# Patient Record
Sex: FEMALE | Race: WHITE | Employment: OTHER | URBAN - METROPOLITAN AREA
[De-identification: names, ages, dates, MRNs, and addresses within clinical notes are randomized per-mention and may not be internally consistent; named-entity substitution may affect disease eponyms.]

---

## 2017-02-21 RX ORDER — FLUTICASONE PROPIONATE 50 MCG
2 SPRAY, SUSPENSION (ML) NASAL DAILY
Qty: 3 BOTTLE | Refills: 3 | Status: SHIPPED | OUTPATIENT
Start: 2017-02-21 | End: 2018-06-19

## 2017-02-21 NOTE — TELEPHONE ENCOUNTER
From: Sharon Rios  To: David Franklin MD  Sent: 2/21/2017 9:44 AM CST  Subject: Medication Renewal Request    Original authorizing provider: MD Sharon Castillo would like a refill of the following medications:  Fluticasone

## 2017-10-19 ENCOUNTER — OFFICE VISIT (OUTPATIENT)
Dept: INTERNAL MEDICINE CLINIC | Facility: CLINIC | Age: 62
End: 2017-10-19

## 2017-10-19 VITALS
HEIGHT: 60 IN | SYSTOLIC BLOOD PRESSURE: 122 MMHG | DIASTOLIC BLOOD PRESSURE: 80 MMHG | WEIGHT: 167 LBS | BODY MASS INDEX: 32.79 KG/M2 | TEMPERATURE: 99 F | HEART RATE: 72 BPM

## 2017-10-19 DIAGNOSIS — G62.9 PERIPHERAL POLYNEUROPATHY: ICD-10-CM

## 2017-10-19 DIAGNOSIS — R51.9 CHRONIC NONINTRACTABLE HEADACHE, UNSPECIFIED HEADACHE TYPE: ICD-10-CM

## 2017-10-19 DIAGNOSIS — M75.81 RIGHT ROTATOR CUFF TENDINITIS: ICD-10-CM

## 2017-10-19 DIAGNOSIS — M32.9 SYSTEMIC LUPUS ERYTHEMATOSUS, UNSPECIFIED SLE TYPE, UNSPECIFIED ORGAN INVOLVEMENT STATUS (HCC): Primary | ICD-10-CM

## 2017-10-19 DIAGNOSIS — G89.29 CHRONIC NONINTRACTABLE HEADACHE, UNSPECIFIED HEADACHE TYPE: ICD-10-CM

## 2017-10-19 DIAGNOSIS — M81.0 OSTEOPOROSIS, UNSPECIFIED OSTEOPOROSIS TYPE, UNSPECIFIED PATHOLOGICAL FRACTURE PRESENCE: ICD-10-CM

## 2017-10-19 PROBLEM — J44.9 ASTHMA WITH COPD (CHRONIC OBSTRUCTIVE PULMONARY DISEASE) (HCC): Chronic | Status: ACTIVE | Noted: 2017-10-19

## 2017-10-19 PROBLEM — J44.89 ASTHMA WITH COPD (CHRONIC OBSTRUCTIVE PULMONARY DISEASE): Chronic | Status: ACTIVE | Noted: 2017-10-19

## 2017-10-19 PROBLEM — E66.01 SEVERE OBESITY (BMI 35.0-39.9) WITH COMORBIDITY (HCC): Chronic | Status: ACTIVE | Noted: 2017-10-19

## 2017-10-19 PROBLEM — J44.89 ASTHMA WITH COPD (CHRONIC OBSTRUCTIVE PULMONARY DISEASE) (HCC): Chronic | Status: ACTIVE | Noted: 2017-10-19

## 2017-10-19 PROCEDURE — G0463 HOSPITAL OUTPT CLINIC VISIT: HCPCS | Performed by: INTERNAL MEDICINE

## 2017-10-19 PROCEDURE — G0008 ADMIN INFLUENZA VIRUS VAC: HCPCS | Performed by: INTERNAL MEDICINE

## 2017-10-19 PROCEDURE — 99214 OFFICE O/P EST MOD 30 MIN: CPT | Performed by: INTERNAL MEDICINE

## 2017-10-19 PROCEDURE — 90686 IIV4 VACC NO PRSV 0.5 ML IM: CPT | Performed by: INTERNAL MEDICINE

## 2017-10-19 NOTE — PROGRESS NOTES
Elysia Villegas is a 63year old female. HPI:   Patient presents with:  Shoulder Pain: Pt complains of right shoulder pain x 6 months.        62 y/o F with SLE here for F/U; c/o right shoulder pain; no trauma; no paresthesias; no F/C; also reports pain Comment:Other reaction(s):  Anaphylaxis  Relafen                 Anaphylaxis              ROS:   Constitutional: no weight loss; no fatigue  Cardiovascular:  Negative for chest pain; negative palpitations  Respiratory:  Negative for cough, dyspnea and whe[place] GI Dr Trino Noyola; had mammogram in March 2017 as ordered by Dr Alison Shanks (chiropractor)         RTC 4 mos            Orders This Visit:    Orders Placed This Encounter      Flulaval 0.5 ml 6 mon and older Quad single dose PF (30825)    Meds This Visit:    No pre

## 2018-06-20 RX ORDER — FLUTICASONE PROPIONATE 50 MCG
SPRAY, SUSPENSION (ML) NASAL
Qty: 48 G | Refills: 3 | Status: SHIPPED | OUTPATIENT
Start: 2018-06-20 | End: 2019-09-07

## 2018-08-30 ENCOUNTER — TELEPHONE (OUTPATIENT)
Dept: INTERNAL MEDICINE CLINIC | Facility: CLINIC | Age: 63
End: 2018-08-30

## 2018-08-30 DIAGNOSIS — Z12.39 SCREENING FOR BREAST CANCER: Primary | ICD-10-CM

## 2018-08-30 NOTE — TELEPHONE ENCOUNTER
Please call pt, had labs done Monday at [facility], were results rec'd? Pt also requesting order for mammogram, would like to go prior to physical on 9/17/18.     Please call to advise  Tasked to nursing

## 2018-08-30 NOTE — TELEPHONE ENCOUNTER
Please advise regarding pending order for mammogram and patient wants to know if labs were received from [facility] thanks - to DR. VALDEZ

## 2018-08-30 NOTE — TELEPHONE ENCOUNTER
I have placed order for screening mammogram; I have not received any report of lab results from [address] [place] [address]; please call pt

## 2018-08-30 NOTE — TELEPHONE ENCOUNTER
Called patient. Relayed Dr Claudeen Pretzel message to her. She verbalized understanding. I gave her phone number for central scheduling to schedule mammogram.  She does not think the lab results are in yet, she usually gets notified when they are.

## 2018-09-17 ENCOUNTER — TELEPHONE (OUTPATIENT)
Dept: INTERNAL MEDICINE CLINIC | Facility: CLINIC | Age: 63
End: 2018-09-17

## 2018-09-17 ENCOUNTER — HOSPITAL ENCOUNTER (OUTPATIENT)
Dept: BONE DENSITY | Age: 63
Discharge: HOME | End: 2018-09-17
Attending: INTERNAL MEDICINE
Payer: MEDICARE

## 2018-09-17 ENCOUNTER — HOSPITAL ENCOUNTER (OUTPATIENT)
Dept: MAMMOGRAPHY | Age: 63
Discharge: HOME | End: 2018-09-17
Attending: INTERNAL MEDICINE
Payer: MEDICARE

## 2018-09-17 ENCOUNTER — OFFICE VISIT (OUTPATIENT)
Dept: INTERNAL MEDICINE CLINIC | Facility: CLINIC | Age: 63
End: 2018-09-17
Payer: MEDICARE

## 2018-09-17 VITALS
HEART RATE: 74 BPM | HEIGHT: 60 IN | BODY MASS INDEX: 34.75 KG/M2 | TEMPERATURE: 98 F | WEIGHT: 177 LBS | DIASTOLIC BLOOD PRESSURE: 68 MMHG | SYSTOLIC BLOOD PRESSURE: 126 MMHG

## 2018-09-17 DIAGNOSIS — R92.8 ABNORMAL MAMMOGRAM: ICD-10-CM

## 2018-09-17 DIAGNOSIS — Z86.59 HISTORY OF DEPRESSION: ICD-10-CM

## 2018-09-17 DIAGNOSIS — M81.8 OTHER OSTEOPOROSIS, UNSPECIFIED PATHOLOGICAL FRACTURE PRESENCE: ICD-10-CM

## 2018-09-17 DIAGNOSIS — Z00.00 PHYSICAL EXAM, ANNUAL: Primary | ICD-10-CM

## 2018-09-17 DIAGNOSIS — R92.8 ABNORMAL MAMMOGRAM: Primary | ICD-10-CM

## 2018-09-17 DIAGNOSIS — M81.0 OSTEOPOROSIS: ICD-10-CM

## 2018-09-17 DIAGNOSIS — M32.9 SYSTEMIC LUPUS ERYTHEMATOSUS, UNSPECIFIED SLE TYPE, UNSPECIFIED ORGAN INVOLVEMENT STATUS (HCC): ICD-10-CM

## 2018-09-17 DIAGNOSIS — G62.9 PERIPHERAL POLYNEUROPATHY: ICD-10-CM

## 2018-09-17 DIAGNOSIS — R51.9 CHRONIC NONINTRACTABLE HEADACHE, UNSPECIFIED HEADACHE TYPE: ICD-10-CM

## 2018-09-17 DIAGNOSIS — E66.9 OBESITY (BMI 30.0-34.9): ICD-10-CM

## 2018-09-17 DIAGNOSIS — Z12.39 SCREENING FOR BREAST CANCER: ICD-10-CM

## 2018-09-17 DIAGNOSIS — G89.29 CHRONIC NONINTRACTABLE HEADACHE, UNSPECIFIED HEADACHE TYPE: ICD-10-CM

## 2018-09-17 DIAGNOSIS — K64.8 INTERNAL HEMORRHOID: ICD-10-CM

## 2018-09-17 DIAGNOSIS — K21.9 GASTROESOPHAGEAL REFLUX DISEASE, ESOPHAGITIS PRESENCE NOT SPECIFIED: ICD-10-CM

## 2018-09-17 PROCEDURE — G0008 ADMIN INFLUENZA VIRUS VAC: HCPCS | Performed by: INTERNAL MEDICINE

## 2018-09-17 PROCEDURE — 77080 DXA BONE DENSITY AXIAL: CPT | Performed by: INTERNAL MEDICINE

## 2018-09-17 PROCEDURE — 90686 IIV4 VACC NO PRSV 0.5 ML IM: CPT | Performed by: INTERNAL MEDICINE

## 2018-09-17 PROCEDURE — G0438 PPPS, INITIAL VISIT: HCPCS | Performed by: INTERNAL MEDICINE

## 2018-09-17 RX ORDER — PREDNISONE 10 MG/1
10 TABLET ORAL
Refills: 0 | Status: ON HOLD | COMMUNITY
Start: 2018-08-27 | End: 2018-11-01 | Stop reason: ALTCHOICE

## 2018-09-17 RX ORDER — METHOTREXATE 25 MG/ML
0.4 INJECTION, SOLUTION INTRA-ARTERIAL; INTRAMUSCULAR; INTRAVENOUS
Refills: 0 | Status: ON HOLD | COMMUNITY
Start: 2018-09-10 | End: 2018-11-02

## 2018-09-17 RX ORDER — PANTOPRAZOLE SODIUM 40 MG/1
40 TABLET, DELAYED RELEASE ORAL
Qty: 90 TABLET | Refills: 3 | Status: SHIPPED | OUTPATIENT
Start: 2018-09-17 | End: 2019-09-07

## 2018-09-17 NOTE — PROGRESS NOTES
Kalyan Tejeda is a 66year old female. HPI:   Patient presents with:  Physical: Pt is here for Medicare physical  Hemorrhoids: Pt also states she has loose bowels and didn't know if they were related to the hemorrhoids.    Foot Pain: Pt states she ha Take 1 tablet (40 mg total) by mouth every morning before breakfast. Disp: 90 tablet Rfl: 3   FLUTICASONE PROPIONATE 50 MCG/ACT Nasal Suspension USE 2 SPRAYS IN EACH NOSTRIL EVERY DAY Disp: 48 g Rfl: 3   gabapentin (NEURONTIN) 300 MG Oral Cap Take 2 capsul Functional Status     Hearing Problems?: No    Vision Problems? : No    Difficulty walking?: Yes    Difficulty dressing or bathing?: No    Problems with daily activities? : Yes    Memory Problems?: No      Fall/Risk Assessment     Do you have 3 or more GLUCOSE (mg/dL)   Date Value   05/02/2014 109 (H)    No flowsheet data found.    Cardiovascular Disease Screening     LDL Annually No results found for: LDL, LDLC     EKG One Time     Colorectal Cancer Screening      Colonoscopy Screen every 10 years [place] No flowsheet data found. Creatinine  Annually CREATININE (mg/dL)   Date Value   05/02/2014 0.69    No flowsheet data found. Digoxin Serum Conc  Annually No results found for: DIGOXIN No flowsheet data found.     Diabetes      HgbA1C  Annually No re wheezes  Abdomen: normoactive bowel sounds, soft, non-tender and non-distended  Extremities: no clubbing, cyanosis or edema  Vascular: no carotid bruits; DP/PT 2/2  Musculoskeletal: Motor 5/5 upper and lower extremities  Neurological: cranial nerves II-XII Visit:  Requested Prescriptions     Signed Prescriptions Disp Refills   • Pantoprazole Sodium 40 MG Oral Tab EC 90 tablet 3     Sig: Take 1 tablet (40 mg total) by mouth every morning before breakfast.       Imaging & Referrals:  SURGERY - INTERNAL  FLULAV

## 2018-09-17 NOTE — TELEPHONE ENCOUNTER
Routine Mammogram shows lump in right breast.  Needs order for Diagnostic Mammogram with possible U/S RT breast.    Please call Lara Crowell [phone] when ordered has been entered.   Routed high to clinical.

## 2018-09-20 ENCOUNTER — TELEPHONE (OUTPATIENT)
Dept: INTERNAL MEDICINE CLINIC | Facility: CLINIC | Age: 63
End: 2018-09-20

## 2018-09-20 ENCOUNTER — HOSPITAL ENCOUNTER (OUTPATIENT)
Dept: ULTRASOUND IMAGING | Facility: HOSPITAL | Age: 63
Discharge: HOME | End: 2018-09-20
Attending: INTERNAL MEDICINE
Payer: MEDICARE

## 2018-09-20 ENCOUNTER — HOSPITAL ENCOUNTER (OUTPATIENT)
Dept: MAMMOGRAPHY | Facility: HOSPITAL | Age: 63
Discharge: HOME | End: 2018-09-20
Attending: INTERNAL MEDICINE
Payer: MEDICARE

## 2018-09-20 DIAGNOSIS — N63.10 MASS OF BREAST, RIGHT: Primary | ICD-10-CM

## 2018-09-20 DIAGNOSIS — R92.8 ABNORMAL MAMMOGRAM: ICD-10-CM

## 2018-09-20 DIAGNOSIS — R92.1 BREAST CALCIFICATIONS: ICD-10-CM

## 2018-09-20 PROCEDURE — 77062 BREAST TOMOSYNTHESIS BI: CPT | Performed by: INTERNAL MEDICINE

## 2018-09-20 PROCEDURE — 76642 ULTRASOUND BREAST LIMITED: CPT | Performed by: INTERNAL MEDICINE

## 2018-09-20 PROCEDURE — 77066 DX MAMMO INCL CAD BI: CPT | Performed by: INTERNAL MEDICINE

## 2018-09-20 NOTE — TELEPHONE ENCOUNTER
Pt had mammogram today   Asks for Dr Marleen Alexander to review results ASAP  So she can schedule a biopsy    [phone]

## 2018-09-20 NOTE — IMAGING NOTE
Discussed recommended breast biopsy with patient. Met  With J Carlos Luz in consult room .   Hx of palpable area right breast x 2 months Dr Darron Casey recommending an ultrasound and stereotactic biopsy of the  Right breast. Pt verbalizes to this rn arin to biopsy denies usage    -Aspirin currently being taken as a preventative measure should be held for 5 days prior  to biopsy. Denies usage    -Aspirin (325mg) being taken related to a cardiac condition should be held at the  direction of your physician. Additional mammography films will then be taken to assure correct placement of the marker. Educated the patient they will be awake during this procedure and are able to drive themselves home if they wish.     Educated patient that some soreness may occur a

## 2018-09-20 NOTE — TELEPHONE ENCOUNTER
Received call from radiology; pt has breast mass at right subareolar area suspicious for malignancy; also has area of calcifications in upper-central-posterior right breast that needs biopsy; breast care coordinator will be coordinating the two biopsies; b

## 2018-09-20 NOTE — IMAGING NOTE
Discussed recommended breast biopsy with patient. Reviewed pertinent patient history, family history of cancer, and patient medications.   Patient reports taking the following medications and over-the-counter supplements:    Discussed with patient Radiol this procedure and are able to drive themselves home if they wish. Educated patient that some soreness may occur after biopsy. Discussed use of a supportive bra and ice packs after procedure, to decrease soreness.     Discussed with patient no swimming,

## 2018-09-21 ENCOUNTER — TELEPHONE (OUTPATIENT)
Dept: HEMATOLOGY/ONCOLOGY | Facility: HOSPITAL | Age: 63
End: 2018-09-21

## 2018-09-21 NOTE — TELEPHONE ENCOUNTER
Phoned patient and introduced myself as Breast Oncology RN Navigator. Patient has been scheduled for right breast biopsy 9/26/18, by the Department of Radiology.   Shared with patient that Dr. Anshu Lopez has referred her to Dr Venu De La Paz for follow up after h

## 2018-09-26 ENCOUNTER — HOSPITAL ENCOUNTER (OUTPATIENT)
Dept: MAMMOGRAPHY | Facility: HOSPITAL | Age: 63
Discharge: HOME | End: 2018-09-26
Attending: INTERNAL MEDICINE
Payer: MEDICARE

## 2018-09-26 ENCOUNTER — HOSPITAL ENCOUNTER (OUTPATIENT)
Dept: ULTRASOUND IMAGING | Facility: HOSPITAL | Age: 63
Discharge: HOME | End: 2018-09-26
Attending: INTERNAL MEDICINE
Payer: MEDICARE

## 2018-09-26 VITALS
BODY MASS INDEX: 34.36 KG/M2 | SYSTOLIC BLOOD PRESSURE: 166 MMHG | WEIGHT: 175 LBS | HEIGHT: 60 IN | HEART RATE: 81 BPM | DIASTOLIC BLOOD PRESSURE: 90 MMHG | RESPIRATION RATE: 18 BRPM

## 2018-09-26 VITALS
HEIGHT: 60 IN | DIASTOLIC BLOOD PRESSURE: 72 MMHG | SYSTOLIC BLOOD PRESSURE: 157 MMHG | BODY MASS INDEX: 34.36 KG/M2 | HEART RATE: 77 BPM | WEIGHT: 175 LBS

## 2018-09-26 DIAGNOSIS — R92.1 BREAST CALCIFICATIONS: ICD-10-CM

## 2018-09-26 DIAGNOSIS — R92.8 ABNORMAL MAMMOGRAM: ICD-10-CM

## 2018-09-26 DIAGNOSIS — N63.10 MASS OF BREAST, RIGHT: ICD-10-CM

## 2018-09-26 PROCEDURE — 77065 DX MAMMO INCL CAD UNI: CPT | Performed by: INTERNAL MEDICINE

## 2018-09-26 PROCEDURE — 88360 TUMOR IMMUNOHISTOCHEM/MANUAL: CPT | Performed by: INTERNAL MEDICINE

## 2018-09-26 PROCEDURE — 88305 TISSUE EXAM BY PATHOLOGIST: CPT | Performed by: INTERNAL MEDICINE

## 2018-09-26 PROCEDURE — 19081 BX BREAST 1ST LESION STRTCTC: CPT | Performed by: INTERNAL MEDICINE

## 2018-09-26 PROCEDURE — 88341 IMHCHEM/IMCYTCHM EA ADD ANTB: CPT | Performed by: INTERNAL MEDICINE

## 2018-09-26 PROCEDURE — 88342 IMHCHEM/IMCYTCHM 1ST ANTB: CPT | Performed by: INTERNAL MEDICINE

## 2018-09-26 PROCEDURE — 19083 BX BREAST 1ST LESION US IMAG: CPT | Performed by: INTERNAL MEDICINE

## 2018-09-26 PROCEDURE — 88374 M/PHMTRC ALYS ISHQUANT/SEMIQ: CPT | Performed by: PATHOLOGY

## 2018-09-26 RX ORDER — LIDOCAINE HYDROCHLORIDE AND EPINEPHRINE 10; 10 MG/ML; UG/ML
INJECTION, SOLUTION INFILTRATION; PERINEURAL
Status: DISPENSED
Start: 2018-09-26 | End: 2018-09-26

## 2018-09-26 RX ORDER — LIDOCAINE HYDROCHLORIDE AND EPINEPHRINE 10; 10 MG/ML; UG/ML
1 INJECTION, SOLUTION INFILTRATION; PERINEURAL ONCE
Status: COMPLETED | OUTPATIENT
Start: 2018-09-26 | End: 2018-09-26

## 2018-09-26 RX ORDER — SODIUM CHLORIDE 9 MG/ML
INJECTION, SOLUTION INTRAVENOUS
Status: DISPENSED
Start: 2018-09-26 | End: 2018-09-26

## 2018-09-26 RX ORDER — LIDOCAINE HYDROCHLORIDE 10 MG/ML
5 INJECTION, SOLUTION EPIDURAL; INFILTRATION; INTRACAUDAL; PERINEURAL ONCE
Status: COMPLETED | OUTPATIENT
Start: 2018-09-26 | End: 2018-09-26

## 2018-09-26 RX ORDER — LIDOCAINE HYDROCHLORIDE 10 MG/ML
INJECTION, SOLUTION EPIDURAL; INFILTRATION; INTRACAUDAL; PERINEURAL
Status: DISPENSED
Start: 2018-09-26 | End: 2018-09-26

## 2018-09-26 RX ORDER — MAGNESIUM HYDROXIDE 1200 MG/15ML
250 LIQUID ORAL CONTINUOUS
Status: DISCONTINUED | OUTPATIENT
Start: 2018-09-26 | End: 2018-09-28

## 2018-09-26 RX ADMIN — MAGNESIUM HYDROXIDE 250 ML: 1200 LIQUID ORAL at 11:48:00

## 2018-09-26 NOTE — IMAGING NOTE
1225: Mrs. Wes Armas brought to ultrasound room #4 post stereotactic right breast biopsy.  SCANS BY Ultrasound Technologist, Que ABDALLA TAKEN:  Reports history of right breast biopsy 4587/5521- benign findings, fibroadenoma.   Reported right breast m

## 2018-09-26 NOTE — IMAGING NOTE
1000 am hx taken          Hx of palpable area right breast x 2 months Dr Eddy Public recommending an ultrasound and stereotactic biopsy of the  Right breast. Pt has 2 children spouse  at age 47 from stroke.  Hx of lupus fibromyalgia     Per Dr Eddy Public b CORETAINER TAKEN TO BE IMAGED, AFTER IMAGING COMPLETED  FORMALIN ADDED TO SAMPLES AT 1151 am     CLIP INSERTED PROCEDURE COMPLETE AT 1152 amPRESSURE TO SITE X 10 MINUTES MANUALLY BY RN AND BY MACHINE COMPRESSION    1218 pm CORES TAKEN TO PATHLOGY BY noel

## 2018-09-26 NOTE — PROCEDURES
[facility] HOSP - [facility]  Procedure Note    Mamta Pierce Patient Status:  Outpatient    1955 MRN [id number]   Location [address] [facility] Attending Charles Craig MD   Hosp Day # 0 PCP Lakeisha Coppola MD     [facility] How Severe Is It?: moderate Is This A New Presentation, Or A Follow-Up?: Follow Up Accutane When Was Accutane Started?: 05/09/2017

## 2018-09-27 ENCOUNTER — TELEPHONE (OUTPATIENT)
Dept: INTERNAL MEDICINE CLINIC | Facility: CLINIC | Age: 63
End: 2018-09-27

## 2018-09-27 NOTE — TELEPHONE ENCOUNTER
Right breast mass at 1 o'clock, 2 cm from the nipple is positive for malignancy; pt called; she is seeing Dr Bin Pedersen tomorrow for surgical consultation

## 2018-09-28 ENCOUNTER — TELEPHONE (OUTPATIENT)
Dept: HEMATOLOGY/ONCOLOGY | Facility: HOSPITAL | Age: 63
End: 2018-09-28

## 2018-09-28 PROBLEM — C50.911 STAGE I BREAST CANCER, RIGHT (HCC): Status: ACTIVE | Noted: 2018-09-28

## 2018-09-28 NOTE — TELEPHONE ENCOUNTER
Phoned patient as follow up to her right breast US guided biopsy. Patient is aware of the malignant pathology results, having spoken with Dr. Terri Torres yesterday evening. Patient is scheduled to meet with Dr. Meenu Green today at 2:15.     Reinforced my rol

## 2018-10-01 ENCOUNTER — TELEPHONE (OUTPATIENT)
Dept: HEMATOLOGY/ONCOLOGY | Facility: HOSPITAL | Age: 63
End: 2018-10-01

## 2018-10-01 ENCOUNTER — APPOINTMENT (OUTPATIENT)
Dept: RADIATION ONCOLOGY | Facility: HOSPITAL | Age: 63
End: 2018-10-01
Attending: RADIOLOGY
Payer: MEDICARE

## 2018-10-01 NOTE — TELEPHONE ENCOUNTER
Phoned patient to discuss care coordination. Patient was seen by Dr. Ron Major 9/28/18 to discuss surgical management of her right breast cancer. Dr. Ron Major has referred patient for consultation with Dr. Brandt North, as well as Dr. Corinna Lyn.   In addition, joe

## 2018-10-01 NOTE — TELEPHONE ENCOUNTER
Phoned patient to discuss genetic counseling and testing. Patient has a family history of pancreatic cancer in her maternal aunt.   Shared with patient that her recent diagnosis, as well as family history, she does meet criteria for genetic testing, should

## 2018-10-02 ENCOUNTER — HOSPITAL ENCOUNTER (OUTPATIENT)
Dept: MRI IMAGING | Age: 63
Discharge: HOME | End: 2018-10-02
Attending: SURGERY
Payer: MEDICARE

## 2018-10-02 ENCOUNTER — HOSPITAL ENCOUNTER (OUTPATIENT)
Dept: GENERAL RADIOLOGY | Age: 63
Discharge: HOME | End: 2018-10-02
Attending: SURGERY
Payer: MEDICARE

## 2018-10-02 DIAGNOSIS — M32.0 DRUG-INDUCED SYSTEMIC LUPUS ERYTHEMATOSUS (HCC): ICD-10-CM

## 2018-10-02 DIAGNOSIS — M32.0 DRUG-INDUCED SYSTEMIC LUPUS ERYTHEMATOSUS, UNSPECIFIED ORGAN INVOLVEMENT STATUS (HCC): ICD-10-CM

## 2018-10-02 DIAGNOSIS — C50.911 STAGE I BREAST CANCER, RIGHT (HCC): ICD-10-CM

## 2018-10-02 PROCEDURE — A9575 INJ GADOTERATE MEGLUMI 0.1ML: HCPCS | Performed by: SURGERY

## 2018-10-02 PROCEDURE — 71046 X-RAY EXAM CHEST 2 VIEWS: CPT | Performed by: SURGERY

## 2018-10-02 PROCEDURE — 82565 ASSAY OF CREATININE: CPT

## 2018-10-02 PROCEDURE — 0159T MRI BREAST (W+WO) W/CAD BILAT (CPT=77059/0159T): CPT | Performed by: SURGERY

## 2018-10-02 PROCEDURE — 77059 MRI BREAST (W+WO) W/CAD BILAT (CPT=77059/0159T): CPT | Performed by: SURGERY

## 2018-10-04 ENCOUNTER — GENETICS ENCOUNTER (OUTPATIENT)
Dept: GENETICS | Facility: HOSPITAL | Age: 63
End: 2018-10-04
Attending: GENETIC COUNSELOR, MS
Payer: MEDICARE

## 2018-10-04 ENCOUNTER — LABORATORY ENCOUNTER (OUTPATIENT)
Dept: HEMATOLOGY/ONCOLOGY | Facility: HOSPITAL | Age: 63
End: 2018-10-04
Attending: GENETIC COUNSELOR, MS
Payer: MEDICARE

## 2018-10-04 DIAGNOSIS — C50.911 STAGE I BREAST CANCER, RIGHT (HCC): Primary | ICD-10-CM

## 2018-10-04 DIAGNOSIS — Z80.0 FAMILY HISTORY OF PANCREATIC CANCER: ICD-10-CM

## 2018-10-04 DIAGNOSIS — Z80.42 FAMILY HISTORY OF PROSTATE CANCER: ICD-10-CM

## 2018-10-04 PROCEDURE — 36415 COLL VENOUS BLD VENIPUNCTURE: CPT

## 2018-10-04 PROCEDURE — 96040 MEDICAL GENETICS COUNSELING EA 30 MIN: CPT | Performed by: GENETIC COUNSELOR, MS

## 2018-10-04 NOTE — PROGRESS NOTES
Reason for visit: Mrs. Jose Casey is a 82-year-old woman referred to genetic counseling due to her recent diagnosis of breast cancer. She was seen today to discuss the option of genetic testing and how this may help with her management and surgical options. age 63. Her sister was diagnosed with thyroid cancer (pathology unknown to her) at age 64 and was treated with surgery only and is alive and well at age 59. Her mother was diagnosed with thyroid cancer at age 32 and with lung cancer at age 61.   She is a combination with other cancers, especially ovarian cancer, and when cancer appears to be passing from generation to generation. We discussed dominant inheritance, the pattern in which most hereditary cancer conditions are inherited.   If an individual ha carriers of some of these mutations.   There are panels that assess for mutations in only “high risk” and clinically actionable breast cancer genes as well as larger panels that assess for mutations in high, moderate and unknown-penetrance breast cancer gen available. 3. Recommendations for Mrs. Renny Zeng and family members will depend on above test results. Send to: Dr. Mendy Ramirez. Josiah Watson  Time spent with patient: 40 minutes.

## 2018-10-05 ENCOUNTER — OFFICE VISIT (OUTPATIENT)
Dept: RADIATION ONCOLOGY | Facility: HOSPITAL | Age: 63
End: 2018-10-05
Attending: RADIOLOGY
Payer: MEDICARE

## 2018-10-05 ENCOUNTER — OFFICE VISIT (OUTPATIENT)
Dept: HEMATOLOGY/ONCOLOGY | Facility: HOSPITAL | Age: 63
End: 2018-10-05
Attending: GENETIC COUNSELOR, MS
Payer: MEDICARE

## 2018-10-05 VITALS
SYSTOLIC BLOOD PRESSURE: 138 MMHG | TEMPERATURE: 98 F | HEART RATE: 89 BPM | HEIGHT: 60 IN | DIASTOLIC BLOOD PRESSURE: 68 MMHG | RESPIRATION RATE: 16 BRPM | WEIGHT: 173 LBS | BODY MASS INDEX: 33.96 KG/M2

## 2018-10-05 VITALS
WEIGHT: 173 LBS | RESPIRATION RATE: 18 BRPM | DIASTOLIC BLOOD PRESSURE: 68 MMHG | HEIGHT: 60 IN | BODY MASS INDEX: 33.96 KG/M2 | SYSTOLIC BLOOD PRESSURE: 138 MMHG | HEART RATE: 89 BPM | TEMPERATURE: 98 F

## 2018-10-05 DIAGNOSIS — C50.911 MALIGNANT NEOPLASM OF RIGHT BREAST IN FEMALE, ESTROGEN RECEPTOR POSITIVE, UNSPECIFIED SITE OF BREAST (HCC): Primary | ICD-10-CM

## 2018-10-05 DIAGNOSIS — Z17.0 MALIGNANT NEOPLASM OF RIGHT BREAST IN FEMALE, ESTROGEN RECEPTOR POSITIVE, UNSPECIFIED SITE OF BREAST (HCC): Primary | ICD-10-CM

## 2018-10-05 DIAGNOSIS — M32.9 SYSTEMIC LUPUS ERYTHEMATOSUS, UNSPECIFIED SLE TYPE, UNSPECIFIED ORGAN INVOLVEMENT STATUS (HCC): ICD-10-CM

## 2018-10-05 PROCEDURE — 99212 OFFICE O/P EST SF 10 MIN: CPT

## 2018-10-05 PROCEDURE — 99205 OFFICE O/P NEW HI 60 MIN: CPT | Performed by: INTERNAL MEDICINE

## 2018-10-05 NOTE — PROGRESS NOTES
Nursing Consultation Note  Patient: Tracy Cao  YOB: 1955  Age: 66year old  Radiation Oncologist: Dr. Yoan Cornelius  Referring Physician: Kate Murcia  Diagnosis:No diagnosis found.   Consult Date: 10/5/2018      Chemotherapy: Yes, 1 tablet (40 mg total) by mouth every morning before breakfast. Disp: 90 tablet Rfl: 3   FLUTICASONE PROPIONATE 50 MCG/ACT Nasal Suspension USE 2 SPRAYS IN EACH NOSTRIL EVERY DAY Disp: 48 g Rfl: 3   gabapentin (NEURONTIN) 300 MG Oral Cap Take 2 capsules by Food insecurity - inability: Not on file      Transportation needs - medical: Not on file      Transportation needs - non-medical: Not on file    Occupational History      Not on file    Tobacco Use      Smoking status: Never Smoker      Smokeless tobacco:

## 2018-10-05 NOTE — CONSULTS
RADIATION ONCOLOGY NOTE    DATE OF VISIT: 10/5/2018    DIAGNOSIS:  [id number] IDC RUIQ s/p biopsy, ER 95/NM 90 Her2 Fish pending, Favorable Ki-67 8%, Grade 1, genetics pending.     Dear Sherry Ellsworth, [address] and colleagues,    Per discussion at breast t Rfl: 0   Pantoprazole Sodium 40 MG Oral Tab EC Take 1 tablet (40 mg total) by mouth every morning before breakfast. Disp: 90 tablet Rfl: 3   FLUTICASONE PROPIONATE 50 MCG/ACT Nasal Suspension USE 2 SPRAYS IN EACH NOSTRIL EVERY DAY Disp: 48 g Rfl: 3   gabap her maternal grandmother; Cancer (age of onset: 46) in her maternal cousin female; Cancer (age of onset: 61) in her maternal grandfather; Cancer (age of onset: 64) in her sister; Cancer (age of onset: 58) in her maternal aunt;  Cancer (age of onset: 67) in COMPARISON: [facility], [facility], [facility] JOANN 2D+3D DIAGNOSTIC [facility] BILAT (TAV=[id number]), 9/20/2018, 10:29. [facility], [facility]. [facility], [facility] POST [facility] IMAGE RIGHT ([id number]=[id number]), 9/26/2018, 14:14.      INDICATIONS:  M32.0 Drug-in small probable foci throughout the right breast with one most pronounced posteriorly in the midline at the   level of the nipple measuring about 5 mm and about 7.8 cm back from the nipple.  It has similar enhancement pattern to all be other small enhancing CATEGORY 6--KNOWN BIOPSY PROVEN MALIGNANCY: RIGHT BREAST. RECOMMENDATIONS:   SURGICAL CONSULTATION. PLEASE NOTE: A NORMAL MRI DOES NOT EXCLUDE THE POSSIBILITY OF BREAST CANCER.   A CLINICALLY SUSPICIOUS PALPABLE LUMP SHOULD BE BIOPS clone [id number]) status:  2+ (Equivocal)  Ki-67 (Leica, monoclonal, clone MM1) status:  8% (Favorable)        ASCO/CAP Scoring Criteria:  ER/PgR:    > 1% (Positive), Intensity of staining (weak, moderate, strong)  <1% (Negative) Internal control present and ER/

## 2018-10-05 NOTE — PROGRESS NOTES
Primary language:  English  Language line required?  no  Comprehension Ability:  excellent  Able to read?  yes  Able to write? yes  Communication tools:  na  Patient's ability to learn:  excellent  Readiness to learn:   Motivated  Learning preferences:  Luz Dowell basix skin care measures during radiation therapy Instruct patient on additional skin care measures during radiation therapy Keep area clean and dry Encourage fluids/diet    Expected Outcomes:  knowledge of care plan    Progress Toward Outcome:  Making pro

## 2018-10-06 NOTE — CONSULTS
[facility]    PATIENT'S NAME: Nataliia Rose JEFE   CONSULTING PHYSICIAN: Elijah Ramirez MD   PATIENT ACCOUNT #: [de-identified] LOCATION: [address] [place] [address] RECORD #: [id number] YOB: 1955   CONSULTATION DATE: 10/05/2018       CANCER C at age 30. MEDICATION:  Prednisone 10 mg daily, Plaquenil, methotrexate,nortriptyline, Protonix, fluticasone. ALLERGIES:  Amitriptyline. FAMILY MEDICAL HISTORY:  No history of breast cancer in the family.   There was history of ovarian cancer in m erythematosus, the patient is more interested in proceeding with mastectomy rather than lumpectomy followed by radiation. We would support this decision. She will be meeting with Surgery again next week.   We will not offer any neoadjuvant treatment, but

## 2018-10-08 ENCOUNTER — TELEPHONE (OUTPATIENT)
Dept: HEMATOLOGY/ONCOLOGY | Facility: HOSPITAL | Age: 63
End: 2018-10-08

## 2018-10-11 ENCOUNTER — TELEPHONE (OUTPATIENT)
Dept: GENETICS | Facility: HOSPITAL | Age: 63
End: 2018-10-11

## 2018-10-11 NOTE — TELEPHONE ENCOUNTER
LM for [facility] that her genetic testing result yielded negative results for the 9 genes studied aside from a VUS in CAROLYNN (c.514T>C). No change in management given VUS result.  She had testing for [facility] Breast Cancer STAT Panel with CAROLYNN and CHEK2 (9 genes,

## 2018-10-19 ENCOUNTER — OFFICE VISIT (OUTPATIENT)
Dept: SURGERY | Facility: CLINIC | Age: 63
End: 2018-10-19
Payer: MEDICARE

## 2018-10-19 VITALS — HEIGHT: 60 IN | WEIGHT: 180.19 LBS | BODY MASS INDEX: 35.37 KG/M2

## 2018-10-19 DIAGNOSIS — C50.911 STAGE I BREAST CANCER, RIGHT (HCC): Primary | ICD-10-CM

## 2018-10-19 PROCEDURE — 99203 OFFICE O/P NEW LOW 30 MIN: CPT | Performed by: SURGERY

## 2018-10-19 NOTE — CONSULTS
New Patient Consultation    This is the first visit for this 66year old female who presents to discuss reconstructive options following surgery for breast cancer. History of Present Illness:    The patient is a 66year old female who presents with a rig Anaphylaxis  Relafen                 ANAPHYLAXIS      Family History:   Family History   Problem Relation Age of Onset   • Prostate Cancer Father 62        d. 58 metastatic   • Heart Attack Father         MI   • Cancer Mother 32        thyroid; lung @ 61 (s swelling. Breasts: The patient denies +breast masses, pain, change in the breast skin, skin dimpling, nipple discharge, or rash.     Gastrointestinal:  There is no history of difficulty or pain with swallowing, +reflux symptoms, nausea, vomiting, dark/b palpable masses. The trachea is in the midline. Conjunctiva are clear, non-icteric. Chest: The chest expands symmetrically. The lungs are clear to auscultation. Heart: The rhythm is regular. There are no murmurs, rubs, gallops or thrills.     Right well as the need for a secondary procedure for placement of a permanent implant,  were reviewed. Representative illustrations and photographs were demonstrated to the patient.  The risks of surgery including but not limited to bleeding, infection, scarring patient was counseled on the elevated risk of wound healing difficulties given her chronic immunosuppressive therapy.     Finally, we discussed the possible need for revisions, the process of nipple areolar reconstruction, and a contralateral balancing proc

## 2018-10-25 RX ORDER — FOLIC ACID 1 MG/1
TABLET ORAL DAILY
COMMUNITY
End: 2019-08-01

## 2018-10-30 ENCOUNTER — TELEPHONE (OUTPATIENT)
Dept: HEMATOLOGY/ONCOLOGY | Facility: HOSPITAL | Age: 63
End: 2018-10-30

## 2018-10-30 ENCOUNTER — OFFICE VISIT (OUTPATIENT)
Dept: PHYSICAL THERAPY | Facility: HOSPITAL | Age: 63
End: 2018-10-30
Attending: SURGERY
Payer: MEDICARE

## 2018-10-30 NOTE — PROGRESS NOTES
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

## 2018-10-30 NOTE — TELEPHONE ENCOUNTER
Phoned patient to provide pre operative education and support. Patient is schedule for right breast modified radical mastectomy, sentinel lymph node biopsy with frozen section. Educated patient as to sentinel lymph node biopsy.  This included lymphoscin

## 2018-11-01 ENCOUNTER — ANESTHESIA (OUTPATIENT)
Dept: SURGERY | Facility: HOSPITAL | Age: 63
DRG: 580 | End: 2018-11-01
Payer: MEDICARE

## 2018-11-01 ENCOUNTER — HOSPITAL ENCOUNTER (OUTPATIENT)
Facility: HOSPITAL | Age: 63
Setting detail: OBSERVATION
Discharge: HOME | DRG: 580 | End: 2018-11-02
Attending: SURGERY | Admitting: INTERNAL MEDICINE
Payer: MEDICARE

## 2018-11-01 ENCOUNTER — ANESTHESIA EVENT (OUTPATIENT)
Dept: SURGERY | Facility: HOSPITAL | Age: 63
DRG: 580 | End: 2018-11-01
Payer: MEDICARE

## 2018-11-01 ENCOUNTER — HOSPITAL ENCOUNTER (OUTPATIENT)
Dept: NUCLEAR MEDICINE | Facility: HOSPITAL | Age: 63
Discharge: HOME | DRG: 580 | End: 2018-11-01
Attending: SURGERY
Payer: MEDICARE

## 2018-11-01 DIAGNOSIS — C50.911 BREAST CANCER, RIGHT (HCC): ICD-10-CM

## 2018-11-01 DIAGNOSIS — C50.911 PRIMARY CANCER OF RIGHT BREAST WITH STAGE 2 NODAL METASTASIS PER AMERICAN JOINT COMMITTEE ON CANCER 7TH EDITION (N2) (HCC): Primary | ICD-10-CM

## 2018-11-01 DIAGNOSIS — C77.9 PRIMARY CANCER OF RIGHT BREAST WITH STAGE 2 NODAL METASTASIS PER AMERICAN JOINT COMMITTEE ON CANCER 7TH EDITION (N2) (HCC): Primary | ICD-10-CM

## 2018-11-01 PROCEDURE — 07T50ZZ RESECTION OF RIGHT AXILLARY LYMPHATIC, OPEN APPROACH: ICD-10-PCS | Performed by: SURGERY

## 2018-11-01 PROCEDURE — C71L1ZZ PLANAR NUCLEAR MEDICINE IMAGING OF UPPER CHEST LYMPHATICS USING TECHNETIUM 99M (TC-99M): ICD-10-PCS | Performed by: RADIOLOGY

## 2018-11-01 PROCEDURE — 3E0W3KZ INTRODUCTION OF OTHER DIAGNOSTIC SUBSTANCE INTO LYMPHATICS, PERCUTANEOUS APPROACH: ICD-10-PCS | Performed by: SURGERY

## 2018-11-01 PROCEDURE — 07B50ZX EXCISION OF RIGHT AXILLARY LYMPHATIC, OPEN APPROACH, DIAGNOSTIC: ICD-10-PCS | Performed by: SURGERY

## 2018-11-01 PROCEDURE — 0HTT0ZZ RESECTION OF RIGHT BREAST, OPEN APPROACH: ICD-10-PCS | Performed by: SURGERY

## 2018-11-01 PROCEDURE — A4216 STERILE WATER/SALINE, 10 ML: HCPCS

## 2018-11-01 PROCEDURE — 78195 LYMPH SYSTEM IMAGING: CPT | Performed by: SURGERY

## 2018-11-01 RX ORDER — MORPHINE SULFATE 4 MG/ML
2 INJECTION, SOLUTION INTRAMUSCULAR; INTRAVENOUS EVERY 10 MIN PRN
Status: DISCONTINUED | OUTPATIENT
Start: 2018-11-01 | End: 2018-11-01 | Stop reason: HOSPADM

## 2018-11-01 RX ORDER — SODIUM PHOSPHATE, DIBASIC AND SODIUM PHOSPHATE, MONOBASIC 7; 19 G/133ML; G/133ML
1 ENEMA RECTAL ONCE AS NEEDED
Status: DISCONTINUED | OUTPATIENT
Start: 2018-11-01 | End: 2018-11-02

## 2018-11-01 RX ORDER — HYDROCODONE BITARTRATE AND ACETAMINOPHEN 5; 325 MG/1; MG/1
2 TABLET ORAL AS NEEDED
Status: DISCONTINUED | OUTPATIENT
Start: 2018-11-01 | End: 2018-11-01 | Stop reason: HOSPADM

## 2018-11-01 RX ORDER — FOLIC ACID 1 MG/1
1 TABLET ORAL DAILY
Status: DISCONTINUED | OUTPATIENT
Start: 2018-11-02 | End: 2018-11-02

## 2018-11-01 RX ORDER — METOCLOPRAMIDE 10 MG/1
10 TABLET ORAL ONCE
Status: DISCONTINUED | OUTPATIENT
Start: 2018-11-01 | End: 2018-11-01 | Stop reason: HOSPADM

## 2018-11-01 RX ORDER — GLYCOPYRROLATE 0.2 MG/ML
INJECTION, SOLUTION INTRAMUSCULAR; INTRAVENOUS AS NEEDED
Status: DISCONTINUED | OUTPATIENT
Start: 2018-11-01 | End: 2018-11-01 | Stop reason: SURG

## 2018-11-01 RX ORDER — DEXAMETHASONE SODIUM PHOSPHATE 4 MG/ML
VIAL (ML) INJECTION AS NEEDED
Status: DISCONTINUED | OUTPATIENT
Start: 2018-11-01 | End: 2018-11-01 | Stop reason: SURG

## 2018-11-01 RX ORDER — NORTRIPTYLINE HYDROCHLORIDE 10 MG/1
20 CAPSULE ORAL NIGHTLY
Status: DISCONTINUED | OUTPATIENT
Start: 2018-11-01 | End: 2018-11-02

## 2018-11-01 RX ORDER — DOCUSATE SODIUM 100 MG/1
100 CAPSULE, LIQUID FILLED ORAL 2 TIMES DAILY
Status: DISCONTINUED | OUTPATIENT
Start: 2018-11-01 | End: 2018-11-02

## 2018-11-01 RX ORDER — NEOSTIGMINE METHYLSULFATE 0.5 MG/ML
INJECTION INTRAVENOUS AS NEEDED
Status: DISCONTINUED | OUTPATIENT
Start: 2018-11-01 | End: 2018-11-01 | Stop reason: SURG

## 2018-11-01 RX ORDER — HYDROCODONE BITARTRATE AND ACETAMINOPHEN 5; 325 MG/1; MG/1
1 TABLET ORAL AS NEEDED
Status: DISCONTINUED | OUTPATIENT
Start: 2018-11-01 | End: 2018-11-01 | Stop reason: HOSPADM

## 2018-11-01 RX ORDER — LIDOCAINE HYDROCHLORIDE 10 MG/ML
INJECTION, SOLUTION EPIDURAL; INFILTRATION; INTRACAUDAL; PERINEURAL AS NEEDED
Status: DISCONTINUED | OUTPATIENT
Start: 2018-11-01 | End: 2018-11-01 | Stop reason: SURG

## 2018-11-01 RX ORDER — ACETAMINOPHEN 500 MG
1000 TABLET ORAL EVERY 8 HOURS
Status: DISCONTINUED | OUTPATIENT
Start: 2018-11-01 | End: 2018-11-02

## 2018-11-01 RX ORDER — ONDANSETRON 2 MG/ML
4 INJECTION INTRAMUSCULAR; INTRAVENOUS ONCE AS NEEDED
Status: DISCONTINUED | OUTPATIENT
Start: 2018-11-01 | End: 2018-11-01 | Stop reason: HOSPADM

## 2018-11-01 RX ORDER — SODIUM CHLORIDE, SODIUM LACTATE, POTASSIUM CHLORIDE, CALCIUM CHLORIDE 600; 310; 30; 20 MG/100ML; MG/100ML; MG/100ML; MG/100ML
INJECTION, SOLUTION INTRAVENOUS CONTINUOUS
Status: DISCONTINUED | OUTPATIENT
Start: 2018-11-01 | End: 2018-11-01 | Stop reason: ALTCHOICE

## 2018-11-01 RX ORDER — DOCUSATE SODIUM 100 MG/1
100 CAPSULE, LIQUID FILLED ORAL 2 TIMES DAILY
Qty: 14 CAPSULE | Refills: 1 | Status: SHIPPED | OUTPATIENT
Start: 2018-11-01 | End: 2018-11-08

## 2018-11-01 RX ORDER — FAMOTIDINE 20 MG/1
20 TABLET ORAL ONCE
Status: DISCONTINUED | OUTPATIENT
Start: 2018-11-01 | End: 2018-11-01 | Stop reason: HOSPADM

## 2018-11-01 RX ORDER — MORPHINE SULFATE 10 MG/ML
6 INJECTION, SOLUTION INTRAMUSCULAR; INTRAVENOUS EVERY 10 MIN PRN
Status: DISCONTINUED | OUTPATIENT
Start: 2018-11-01 | End: 2018-11-01 | Stop reason: HOSPADM

## 2018-11-01 RX ORDER — GABAPENTIN 300 MG/1
600 CAPSULE ORAL 2 TIMES DAILY
Status: DISCONTINUED | OUTPATIENT
Start: 2018-11-01 | End: 2018-11-02

## 2018-11-01 RX ORDER — MORPHINE SULFATE 2 MG/ML
2 INJECTION, SOLUTION INTRAMUSCULAR; INTRAVENOUS EVERY 2 HOUR PRN
Status: DISCONTINUED | OUTPATIENT
Start: 2018-11-01 | End: 2018-11-02

## 2018-11-01 RX ORDER — TRAMADOL HYDROCHLORIDE 50 MG/1
TABLET ORAL EVERY 6 HOURS PRN
Status: DISCONTINUED | OUTPATIENT
Start: 2018-11-01 | End: 2018-11-02

## 2018-11-01 RX ORDER — PHENYLEPHRINE HCL 10 MG/ML
VIAL (ML) INJECTION AS NEEDED
Status: DISCONTINUED | OUTPATIENT
Start: 2018-11-01 | End: 2018-11-01 | Stop reason: SURG

## 2018-11-01 RX ORDER — ACETAMINOPHEN 500 MG
1000 TABLET ORAL ONCE
Status: COMPLETED | OUTPATIENT
Start: 2018-11-01 | End: 2018-11-01

## 2018-11-01 RX ORDER — CEFAZOLIN SODIUM/WATER 2 G/20 ML
2 SYRINGE (ML) INTRAVENOUS ONCE
Status: COMPLETED | OUTPATIENT
Start: 2018-11-01 | End: 2018-11-01

## 2018-11-01 RX ORDER — BISACODYL 10 MG
10 SUPPOSITORY, RECTAL RECTAL
Status: DISCONTINUED | OUTPATIENT
Start: 2018-11-01 | End: 2018-11-02

## 2018-11-01 RX ORDER — NALOXONE HYDROCHLORIDE 0.4 MG/ML
80 INJECTION, SOLUTION INTRAMUSCULAR; INTRAVENOUS; SUBCUTANEOUS AS NEEDED
Status: DISCONTINUED | OUTPATIENT
Start: 2018-11-01 | End: 2018-11-01 | Stop reason: HOSPADM

## 2018-11-01 RX ORDER — METOCLOPRAMIDE HYDROCHLORIDE 5 MG/ML
10 INJECTION INTRAMUSCULAR; INTRAVENOUS EVERY 6 HOURS PRN
Status: DISCONTINUED | OUTPATIENT
Start: 2018-11-01 | End: 2018-11-02

## 2018-11-01 RX ORDER — TRAMADOL HYDROCHLORIDE 50 MG/1
TABLET ORAL EVERY 6 HOURS PRN
Qty: 25 TABLET | Refills: 0 | Status: SHIPPED | OUTPATIENT
Start: 2018-11-01 | End: 2019-03-06

## 2018-11-01 RX ORDER — MORPHINE SULFATE 2 MG/ML
1 INJECTION, SOLUTION INTRAMUSCULAR; INTRAVENOUS EVERY 2 HOUR PRN
Status: DISCONTINUED | OUTPATIENT
Start: 2018-11-01 | End: 2018-11-02

## 2018-11-01 RX ORDER — PANTOPRAZOLE SODIUM 40 MG/1
40 TABLET, DELAYED RELEASE ORAL
Status: DISCONTINUED | OUTPATIENT
Start: 2018-11-02 | End: 2018-11-02

## 2018-11-01 RX ORDER — EPHEDRINE SULFATE 50 MG/ML
INJECTION, SOLUTION INTRAVENOUS AS NEEDED
Status: DISCONTINUED | OUTPATIENT
Start: 2018-11-01 | End: 2018-11-01 | Stop reason: SURG

## 2018-11-01 RX ORDER — CEFAZOLIN SODIUM/WATER 2 G/20 ML
2 SYRINGE (ML) INTRAVENOUS EVERY 8 HOURS
Status: COMPLETED | OUTPATIENT
Start: 2018-11-01 | End: 2018-11-02

## 2018-11-01 RX ORDER — DEXTROSE, SODIUM CHLORIDE, AND POTASSIUM CHLORIDE 5; .45; .15 G/100ML; G/100ML; G/100ML
INJECTION INTRAVENOUS CONTINUOUS
Status: DISCONTINUED | OUTPATIENT
Start: 2018-11-01 | End: 2018-11-02

## 2018-11-01 RX ORDER — ONDANSETRON 2 MG/ML
INJECTION INTRAMUSCULAR; INTRAVENOUS AS NEEDED
Status: DISCONTINUED | OUTPATIENT
Start: 2018-11-01 | End: 2018-11-01 | Stop reason: SURG

## 2018-11-01 RX ORDER — HYDROXYCHLOROQUINE SULFATE 200 MG/1
200 TABLET, FILM COATED ORAL DAILY
Status: DISCONTINUED | OUTPATIENT
Start: 2018-11-02 | End: 2018-11-02

## 2018-11-01 RX ORDER — SODIUM CHLORIDE, SODIUM LACTATE, POTASSIUM CHLORIDE, CALCIUM CHLORIDE 600; 310; 30; 20 MG/100ML; MG/100ML; MG/100ML; MG/100ML
INJECTION, SOLUTION INTRAVENOUS CONTINUOUS
Status: DISCONTINUED | OUTPATIENT
Start: 2018-11-01 | End: 2018-11-01 | Stop reason: HOSPADM

## 2018-11-01 RX ORDER — ROCURONIUM BROMIDE 10 MG/ML
INJECTION, SOLUTION INTRAVENOUS AS NEEDED
Status: DISCONTINUED | OUTPATIENT
Start: 2018-11-01 | End: 2018-11-01 | Stop reason: SURG

## 2018-11-01 RX ORDER — MIDAZOLAM HYDROCHLORIDE 1 MG/ML
INJECTION INTRAMUSCULAR; INTRAVENOUS AS NEEDED
Status: DISCONTINUED | OUTPATIENT
Start: 2018-11-01 | End: 2018-11-01 | Stop reason: SURG

## 2018-11-01 RX ORDER — ISOSULFAN BLUE 50 MG/5ML
INJECTION, SOLUTION SUBCUTANEOUS AS NEEDED
Status: DISCONTINUED | OUTPATIENT
Start: 2018-11-01 | End: 2018-11-01 | Stop reason: HOSPADM

## 2018-11-01 RX ORDER — MORPHINE SULFATE 4 MG/ML
4 INJECTION, SOLUTION INTRAMUSCULAR; INTRAVENOUS EVERY 2 HOUR PRN
Status: DISCONTINUED | OUTPATIENT
Start: 2018-11-01 | End: 2018-11-02

## 2018-11-01 RX ORDER — ONDANSETRON 2 MG/ML
8 INJECTION INTRAMUSCULAR; INTRAVENOUS EVERY 6 HOURS PRN
Status: DISCONTINUED | OUTPATIENT
Start: 2018-11-01 | End: 2018-11-02

## 2018-11-01 RX ORDER — FLUTICASONE PROPIONATE 50 MCG
2 SPRAY, SUSPENSION (ML) NASAL DAILY
Status: DISCONTINUED | OUTPATIENT
Start: 2018-11-01 | End: 2018-11-02

## 2018-11-01 RX ORDER — MORPHINE SULFATE 4 MG/ML
4 INJECTION, SOLUTION INTRAMUSCULAR; INTRAVENOUS EVERY 10 MIN PRN
Status: DISCONTINUED | OUTPATIENT
Start: 2018-11-01 | End: 2018-11-01 | Stop reason: HOSPADM

## 2018-11-01 RX ORDER — POLYETHYLENE GLYCOL 3350 17 G/17G
17 POWDER, FOR SOLUTION ORAL DAILY PRN
Status: DISCONTINUED | OUTPATIENT
Start: 2018-11-01 | End: 2018-11-02

## 2018-11-01 RX ADMIN — SODIUM CHLORIDE, SODIUM LACTATE, POTASSIUM CHLORIDE, CALCIUM CHLORIDE: 600; 310; 30; 20 INJECTION, SOLUTION INTRAVENOUS at 10:38:00

## 2018-11-01 RX ADMIN — SODIUM CHLORIDE, SODIUM LACTATE, POTASSIUM CHLORIDE, CALCIUM CHLORIDE: 600; 310; 30; 20 INJECTION, SOLUTION INTRAVENOUS at 13:43:00

## 2018-11-01 RX ADMIN — CEFAZOLIN SODIUM/WATER 2 G: 2 G/20 ML SYRINGE (ML) INTRAVENOUS at 11:01:00

## 2018-11-01 RX ADMIN — GLYCOPYRROLATE 0.8 MG: 0.2 INJECTION, SOLUTION INTRAMUSCULAR; INTRAVENOUS at 13:30:00

## 2018-11-01 RX ADMIN — LIDOCAINE HYDROCHLORIDE 60 MG: 10 INJECTION, SOLUTION EPIDURAL; INFILTRATION; INTRACAUDAL; PERINEURAL at 10:43:00

## 2018-11-01 RX ADMIN — EPHEDRINE SULFATE 10 MG: 50 INJECTION, SOLUTION INTRAVENOUS at 11:10:00

## 2018-11-01 RX ADMIN — PHENYLEPHRINE HCL 100 MCG: 10 MG/ML VIAL (ML) INJECTION at 11:05:00

## 2018-11-01 RX ADMIN — PHENYLEPHRINE HCL 100 MCG: 10 MG/ML VIAL (ML) INJECTION at 10:53:00

## 2018-11-01 RX ADMIN — ONDANSETRON 4 MG: 2 INJECTION INTRAMUSCULAR; INTRAVENOUS at 11:02:00

## 2018-11-01 RX ADMIN — ROCURONIUM BROMIDE 30 MG: 10 INJECTION, SOLUTION INTRAVENOUS at 11:22:00

## 2018-11-01 RX ADMIN — DEXAMETHASONE SODIUM PHOSPHATE 4 MG: 4 MG/ML VIAL (ML) INJECTION at 11:02:00

## 2018-11-01 RX ADMIN — MIDAZOLAM HYDROCHLORIDE 2 MG: 1 INJECTION INTRAMUSCULAR; INTRAVENOUS at 10:38:00

## 2018-11-01 RX ADMIN — SODIUM CHLORIDE, SODIUM LACTATE, POTASSIUM CHLORIDE, CALCIUM CHLORIDE: 600; 310; 30; 20 INJECTION, SOLUTION INTRAVENOUS at 12:00:00

## 2018-11-01 RX ADMIN — NEOSTIGMINE METHYLSULFATE 4 MG: 0.5 INJECTION INTRAVENOUS at 13:30:00

## 2018-11-01 NOTE — ANESTHESIA PREPROCEDURE EVALUATION
Anesthesia PreOp Note    HPI:     Saint Council is a 66year old female who presents for preoperative consultation requested by: Junior Ott MD    Date of Surgery: 11/1/2018    Procedure(s):  BREAST MODIFIED RADICAL MASTECTOMY  BREAST SENTINEL Osteopenia    • Peripheral neuropathy    • Raynaud's phenomenon    • SLE (systemic lupus erythematosus) ([facility] [address]     Dr Edmond Pulse   • Stress fracture of the metatarsals     4th MT       Past Surgical History:   Procedure Laterality Date   • ANKLE FRACTURE SURG (ANCEF/KEFZOL) 2 GM/20ML premix IV syringe 2 g 2 g Intravenous Once Em Centeno MD      No current [facility]-ordered outpatient medications on file.       Amitriptyline Hcl  *        Comment:Other reaction(s): Hallucination  Cyclobenzaprine Hcl*    PAL file    Other Topics      Concerns:         Service: Not Asked        Blood Transfusions: Not Asked        Caffeine Concern: Yes          Coffee 2 cups daily;  Soda        Occupational Exposure: Not Asked        Hobby Hazards: Not Asked        Sleep damage if relevant, major complications, and any alternative forms of anesthetic management. All of the patient's questions were answered to the best of my ability. The patient desires the anesthetic management as planned.   Damien Jack  11/1/2018 10:20

## 2018-11-01 NOTE — CONSULTS
HPI:    Kalyan Tejeda is a 66year old  female. The patient presents to the office for Diagnosis of right breast cancer. The patient has a palpable mass of the right breast. It has been present since recently diagnosed.  Previous breast procedures inclu the right breast was also performed, and is reported separately in same-day stereotactic biopsy report.  Pathology was benign and concordant.     Per report 9/20/2018, a probably benign left breast mass at 3 o'clock, 5 cm from the nipple was also identifie clip in the right lower outer quadrant from previous stereotactic biopsy may be postsurgical in nature as the biopsy was benign.   BI-RADS CATEGORY:      DIAGNOSTIC CATEGORY 6--KNOWN BIOPSY PROVEN MALIGNANCY: RIGHT BREAST.       RECOMMENDATIONS:   SURGICAL Oral Tab Take 1 tablet by mouth daily. Disp:  Rfl:    Nortriptyline HCl (PAMELOR) 10 MG Oral Cap Take 3 tabs daily (Patient taking differently: Take 20 mg by mouth daily.  Take 1 tabs daily) Disp: 270 capsule Rfl: 0   [employer] VIT D 5000 HIGH-POTENCY 5000 UNIT Smokeless tobacco: Never Used    Alcohol use: No    Drug use: Not on file         ROS:   GENERAL HEALTH: otherwise feels well; 2 pounds weight loss  SKIN: denies any unusual skin lesions or rashes  EYES: no visual complaints or deficits  HEENT: denies nasa 66year old female with new diagnosis of right breast cancer. I had extensive discussions with the patient as well as her daughter's etiology above. She has met with medical oncology as well as radiation oncology.   I reviewed MRI results in detail with noel infection  Attention will be made during the operation to obtain adequate surgical margins. Pt wants to proceed.   We will plan right breast modified radical mastectomy with right axillary sentinel lymph node biopsy and frozen section possible lymph node d

## 2018-11-01 NOTE — OPERATIVE REPORT
[facility]    PATIENT'S NAME: Jaci MAYBERRY   ATTENDING PHYSICIAN: Von Harrell DO   OPERATING PHYSICIAN: Janeth Canavan., MD   PATIENT ACCOUNT#:   [de-identified]    LOCATION:  [facility] [address] PACU [facility]  MEDICAL RECORD #:   [id number] anesthesia including myocardial infarction, respiratory failure, renal failure, pulmonary embolus, DVT, and even death as well as increased risk for poor wound healing due to immunosuppressive medications were all discussed in detail with the patient as we present. A 10-second basin was 42. The breast and axilla were removed off the field. Axilla was marked with a suture. Completion axillary lymph node dissection was performed.   Axillary vein was identified, and Level 1 and Level 2 lymph nodes were swept

## 2018-11-01 NOTE — INTERVAL H&P NOTE
Pre-op Diagnosis: right breast cancer    The above referenced H&P was reviewed by Madison Akhtar MD on 11/1/2018, the patient was examined and no significant changes have occurred in the patient's condition since the H&P was performed.   I discussed wit

## 2018-11-01 NOTE — ANESTHESIA PROCEDURE NOTES
Airway  Date/Time: 11/1/2018 11:22 AM  Urgency: elective    Airway not difficult    General Information and Staff    Patient location during procedure: OR  Anesthesiologist: Aaron Calloway MD  Resident/CRNA: Letty Sandhoff, CRNA  Performed: Stephanie Farrar

## 2018-11-01 NOTE — ANESTHESIA POSTPROCEDURE EVALUATION
Patient: Marta White    Procedure Summary     Date:  11/01/18 Room / Location:  [facility] OR  / [facility] OR    Anesthesia Start:  9760 Anesthesia Stop:      Procedures:       BREAST MODIFIED RADICAL MASTECTOMY (Right )      BREAST SENTINEL LYMPH NOD

## 2018-11-01 NOTE — ANESTHESIA PROCEDURE NOTES
ANESTHESIA INTUBATION  Urgency: elective    Airway not difficult    General Information and Staff    Patient location during procedure: OR  Anesthesiologist: Finesse Kerns MD  Resident/CRNA: Tiffanie Pelayo CRNA  Performed: anesthesiologist and KEYSHAWN

## 2018-11-01 NOTE — H&P (VIEW-ONLY)
HPI:    Reno Amaya is a 66year old  female. The patient presents to the office for Diagnosis of right breast cancer. The patient has a palpable mass of the right breast. It has been present since recently diagnosed.  Previous breast procedures inclu the right breast was also performed, and is reported separately in same-day stereotactic biopsy report.  Pathology was benign and concordant.     Per report 9/20/2018, a probably benign left breast mass at 3 o'clock, 5 cm from the nipple was also identifie clip in the right lower outer quadrant from previous stereotactic biopsy may be postsurgical in nature as the biopsy was benign.   BI-RADS CATEGORY:      DIAGNOSTIC CATEGORY 6--KNOWN BIOPSY PROVEN MALIGNANCY: RIGHT BREAST.       RECOMMENDATIONS:   SURGICAL Oral Tab Take 1 tablet by mouth daily. Disp:  Rfl:    Nortriptyline HCl (PAMELOR) 10 MG Oral Cap Take 3 tabs daily (Patient taking differently: Take 20 mg by mouth daily.  Take 1 tabs daily) Disp: 270 capsule Rfl: 0   [facility] VIT D 5000 HIGH-POTENCY 5000 UNIT Smokeless tobacco: Never Used    Alcohol use: No    Drug use: Not on file         ROS:   GENERAL HEALTH: otherwise feels well; 2 pounds weight loss  SKIN: denies any unusual skin lesions or rashes  EYES: no visual complaints or deficits  HEENT: denies nasa 66year old female with new diagnosis of right breast cancer. I had extensive discussions with the patient as well as her daughter's etiology above. She has met with medical oncology as well as radiation oncology.   I reviewed MRI results in detail with noel infection  Attention will be made during the operation to obtain adequate surgical margins. Pt wants to proceed.   We will plan right breast modified radical mastectomy with right axillary sentinel lymph node biopsy and frozen section possible lymph node d

## 2018-11-01 NOTE — PLAN OF CARE
Pt received from PACU. Drowsy but oriented. Family at bedside. VSS. Blue tinge to skin from surgical dye. Jaw pain from intubation noted. Dressing to R breast CDI. Drain care per orders. Tolerating full liquids. Bowel sounds present.  BUE neuro [employer]

## 2018-11-01 NOTE — BRIEF OP NOTE
Pre-Operative Diagnosis: right breast cancer  Preoperative  clinical stage II     Post-Operative Diagnosis: The same positive axillary lymph node metastasis  post operative clinical stage IIb     Procedure Performed:   Procedure(s):  right breast modified

## 2018-11-02 ENCOUNTER — APPOINTMENT (OUTPATIENT)
Dept: HEMATOLOGY/ONCOLOGY | Facility: HOSPITAL | Age: 63
End: 2018-11-02
Attending: INTERNAL MEDICINE
Payer: MEDICARE

## 2018-11-02 VITALS
TEMPERATURE: 98 F | OXYGEN SATURATION: 95 % | BODY MASS INDEX: 34.36 KG/M2 | HEIGHT: 60 IN | WEIGHT: 175 LBS | RESPIRATION RATE: 18 BRPM | SYSTOLIC BLOOD PRESSURE: 113 MMHG | DIASTOLIC BLOOD PRESSURE: 61 MMHG | HEART RATE: 76 BPM

## 2018-11-02 PROCEDURE — 99217 OBSERVATION CARE DISCHARGE: CPT | Performed by: INTERNAL MEDICINE

## 2018-11-02 RX ORDER — FERROUS SULFATE 325(65) MG
325 TABLET ORAL
Qty: 30 TABLET | Refills: 0 | Status: SHIPPED | OUTPATIENT
Start: 2018-11-02 | End: 2019-03-06

## 2018-11-02 NOTE — PAYOR COMM NOTE
--------------  ADMISSION REVIEW     Payor: [employer]  Subscriber #:  [id number]  Authorization Number: [id number]    Admit date: 11/1/18  Admit time: 1501       Admitting Physician: Balaji Cardona DO  Attending Physician:  DO Anette Mckeon Toña Pablo CRNA    11/1/2018 1125 Given 0.005 mg Intravenous Lee Kim CRNA    11/1/2018 1120 Given 0.01 mg Intravenous Toña Cross CRNA      fentaNYL citrate (SUBLIMAZE) 0.05 MG/ML injection 25 mcg     Date Action Dose Route U mg     Date Action Dose Route User    11/1/2018 2000 Given 4 mg Intravenous Judith Huang RN    11/1/2018 1550 Given 4 mg Intravenous Priyanka Mejias RN      neostigmine methylsulfate Young Patel) injection     Date Action Dose Route User    11/1/2018 central coarse heterogeneous calcifications, at low suspicion for malignancy.  Stereotactic guided biopsy is recommended.     Left breast probably benign mass at 3 o'clock, 5 cm from the nipple.  Recommend short interval mammographic and sonographic follow dimension. · See comment.    Estrogen receptor (Leica, monoclonal, clone 6 F11) status: 95% (Strong intensity)  Progesterone receptor (Leica, monoclonal, clone 16) status: 90% (Strong intensity)  HER-2/jean pierre (Leica, monoclonal, clone CB11) status:  2+ (Equiv Surgery again next week. Alexy Gonzales will not offer any neoadjuvant treatment, but we will likely send an Oncotype test from her surgical specimen.     Gyne History:  Menarche at age 17. Age at birth of 1st child: 26. Number of pregnancies: .  Patient has histo indeterminate but will not affect her treatment decision. Katrina Nunn is opting towards mastectomy.  I discussed the need for plastic surgery evaluation.  The patient expressed concerns and does not want reconstruction due to her lupus and concerns of implant decrease her prednisone and stop it if at all possible.  Methotrexate will continue.      11/1 OP REPORT  Pre-Operative Diagnosis: right breast cancer  Preoperative  clinical stage II     Post-Operative Diagnosis: The same positive axillary lymph node metas feeling well thsi morning. No flatus yet.  Pain is managed with tylenol     OBJECTIVE:  /61 (BP Location: Left arm)   Pulse 76   Temp 97.9 °F (36.6 °C) (Oral)   Resp 18   Ht 5' (1.524 m)   Wt 175 lb (79.4 kg)   SpO2 95%   BMI 34.18 kg/m²      Intake/O erythematosus) ([facility] [address]  on [id number] 467 mg po qD, folic acid daily  Off prednisone 10 mg qD, and MTX 12.5 mg /0.4 mL SQ qWeek due to surgery  Follows with Dr Basim Pang     Chronic headache  continue nortriptyline 10 mg two tabs (=20mg) po qHS     Peripheral

## 2018-11-02 NOTE — PROGRESS NOTES
[facility]    Progress Note    Lilliana Geiger Patient Status:  Inpatient    1955 MRN [id number]   Location [facility] 4W/SW/SE Attending Ronda Youssef,    Hosp Day # 1 PCP Ruth Paul MD       SUBJECTIVE:  States s did have a reaction to the blue dye used in anesthesia, but is feeling better now. R arm had some numbness but has seemed to have resolved at ~2am. Reports slight cough. Ambulating well without difficulty.        SLE (systemic lupus erythematosus) (HCC)  on

## 2018-11-02 NOTE — PLAN OF CARE
NEUROLOGICAL - ADULT    • Achieves maximal functionality and self care Progressing        Patient Centered Care    • Patient preferences are identified and integrated in the patient's plan of care Progressing        Patient/Family Goals    • Patient/Family

## 2018-11-02 NOTE — PROGRESS NOTES
[place] [facility] - [facility]    Progress Note    Maryhelen Form Patient Status:  Inpatient    1955 MRN [id number]   Location [facility] 4W/SW/SE Attending Charley Brittle, [address] [facility] Day # 1 PCP Krish Ferraro MD       Subjective:   [facility] counseling/coordination of care:    43328- 25 min      Siva 30 11/2/2018  8:38 AM

## 2018-11-02 NOTE — PLAN OF CARE
NEUROLOGICAL - ADULT    • Achieves maximal functionality and self care Completed        Patient Centered Care    • Patient preferences are identified and integrated in the patient's plan of care Completed        Patient/Family Goals    • Patient/Family Ric

## 2018-11-05 ENCOUNTER — TELEPHONE (OUTPATIENT)
Dept: HEMATOLOGY/ONCOLOGY | Facility: HOSPITAL | Age: 63
End: 2018-11-05

## 2018-11-05 NOTE — TELEPHONE ENCOUNTER
Anahi Orozco called to speak with Brady Mejia regarding a follow up with Dr. Mac Sandhu, Please advise

## 2018-11-09 ENCOUNTER — NURSE NAVIGATOR ENCOUNTER (OUTPATIENT)
Dept: HEMATOLOGY/ONCOLOGY | Facility: HOSPITAL | Age: 63
End: 2018-11-09

## 2018-11-12 ENCOUNTER — TELEPHONE (OUTPATIENT)
Dept: HEMATOLOGY/ONCOLOGY | Facility: HOSPITAL | Age: 63
End: 2018-11-12

## 2018-11-12 NOTE — TELEPHONE ENCOUNTER
Phoned patient to discuss re-scheduling her follow up with Dr. Gilford Gauze. Oncotype DX order has been placed. Will await results prior to next office visit. Appointment re-scheduled for 11/28/18 at 3pm.  All appointment information provided to patient.   She a

## 2018-11-13 ENCOUNTER — PATIENT MESSAGE (OUTPATIENT)
Dept: INTERNAL MEDICINE CLINIC | Facility: CLINIC | Age: 63
End: 2018-11-13

## 2018-11-13 NOTE — TELEPHONE ENCOUNTER
From: Tracy Cao  To: Balta Silveira MD  Sent: 11/13/2018 11:03 AM CST  Subject: Non-Urgent Elis Parra,   When I left the hospital after my mastectomy, I was placed on iron pill due to my low hemoglobin at the time.   How long d

## 2018-11-15 NOTE — PAYOR COMM NOTE
Juliana Villalta #[id number] (64 year old F)   [address] [facility], [address] [place] [address]   Physician   Internal Medicine   Progress Notes   Signed   Date of Service:  11/2/2018  9:58 AM               Signed                   Show:Clear all  [x]Manual[x] CONCLUSION: Successful lymphoscintigraphy procedure.     Dictated by (CST): Ernesto Montes MD on 11/01/2018 at 9:14     Approved by (CST): Ernesto Montes MD on 11/01/2018 at 9:15                       Assessment and Plan:      1) R breast carcinoma s/p R MRM wi

## 2018-11-19 ENCOUNTER — TELEPHONE (OUTPATIENT)
Dept: HEMATOLOGY/ONCOLOGY | Facility: HOSPITAL | Age: 63
End: 2018-11-19

## 2018-11-19 NOTE — TELEPHONE ENCOUNTER
Call received from patient with complaints of pain to her right upper arm. Patient is s/p right modified radical mastectomy, performed 11/1/18. Patient shares in her right upper arm, on the back of her arm she is experiencing \"burning pain\".   The pa

## 2018-11-19 NOTE — TELEPHONE ENCOUNTER
Phoned patient with feedback from Dr. Clarissa Starr. Instructed patient to add anti inflammatory to her pain regimen. Patient denies allergy to NSAID, and has taken Ibuprofen previously. Patient to take Ibuprofen OTC as instructed with food.     Instructed pa

## 2018-11-20 ENCOUNTER — TELEPHONE (OUTPATIENT)
Dept: INTERNAL MEDICINE CLINIC | Facility: CLINIC | Age: 63
End: 2018-11-20

## 2018-11-20 ENCOUNTER — TELEPHONE (OUTPATIENT)
Dept: HEMATOLOGY/ONCOLOGY | Facility: HOSPITAL | Age: 63
End: 2018-11-20

## 2018-11-20 ENCOUNTER — TELEPHONE (OUTPATIENT)
Dept: PHYSICAL THERAPY | Facility: HOSPITAL | Age: 63
End: 2018-11-20

## 2018-11-20 NOTE — TELEPHONE ENCOUNTER
Call received from patient. Patient shares the pain to her right upper extremity persists after adding Ibuprofen to her pain protocol. Patient shares she woke this morning and is now experiencing swelling to her right upper extremity.   Patient describes

## 2018-11-20 NOTE — TELEPHONE ENCOUNTER
Medication questions for Dr Jazmine Theodore    1. Could  pt's gabapentin be increased due to her right arm swelling     2.  Should pt continue taking iron pills prescribed by Dr Ector Zapata due to low hemoglobin after her surgery/mastectomy    Please call to advis

## 2018-11-20 NOTE — TELEPHONE ENCOUNTER
To Dr. Angy Lilly - see below - can gabapentin be increased due to increased burning pain 600mg BID (has taken TID in past with no adverse reaction). Per pt: OK to wait until tomorrow. Pt does have call in to Dr. Carlee Goldstein.

## 2018-11-21 NOTE — TELEPHONE ENCOUNTER
Spoke with [facility] re: her arm swelling. [facility] will stop by the clinic tomorrow after her appointment w/Dr. Melva Robison.   -

## 2018-11-26 ENCOUNTER — OFFICE VISIT (OUTPATIENT)
Dept: PHYSICAL THERAPY | Facility: HOSPITAL | Age: 63
End: 2018-11-26
Attending: SURGERY
Payer: MEDICARE

## 2018-11-26 DIAGNOSIS — I89.0 LYMPHEDEMA: ICD-10-CM

## 2018-11-26 PROCEDURE — 97163 PT EVAL HIGH COMPLEX 45 MIN: CPT | Performed by: PHYSICAL THERAPIST

## 2018-11-26 PROCEDURE — 97140 MANUAL THERAPY 1/> REGIONS: CPT | Performed by: PHYSICAL THERAPIST

## 2018-11-26 PROCEDURE — 97110 THERAPEUTIC EXERCISES: CPT | Performed by: PHYSICAL THERAPIST

## 2018-11-26 NOTE — PROGRESS NOTES
PHYSICAL THERAPY UE LYMPHEDEMA EVALUATION:   Referring Physician: Dr. Yen Rojas  Diagnosis: Lymphedema (I89.0)  Date of Onset: 11/1/18  Insurance: [facility] [facility] Date of Evaluation: 11/26/18  Date of Order: 11/23/18     PATIENT [place] is a 66 4 or more tests/measures) Close clinical monitoring of symptoms and response to treatment interventions will be necessary due to chronicity of and progression of swelling, and use of varying compression, patient's compliance, risk of infections associated flex x 10, abd x 10.  Performed modified MLD to chest, axilla, and right trunk using lotion, gentle scar massage w/ lotion  Charges: PT Eval x1, ex1, man1      Total Timed Treatment: 35 min     Total Treatment Time: 90 min     PLAN OF CARE:    Goals (discus

## 2018-11-27 NOTE — TELEPHONE ENCOUNTER
To Dr. Isaac Elias - spoke with pt who stated that Gabapentin on \"back burner\" at present. Pt has some lymphedema developing along with some \"cording\" per PT. Pt asking when she can stop taking iron pills to reduce constipation - see below.   FYI - Pt st

## 2018-11-28 ENCOUNTER — OFFICE VISIT (OUTPATIENT)
Dept: HEMATOLOGY/ONCOLOGY | Facility: HOSPITAL | Age: 63
End: 2018-11-28
Attending: GENETIC COUNSELOR, MS
Payer: MEDICARE

## 2018-11-28 VITALS
SYSTOLIC BLOOD PRESSURE: 159 MMHG | WEIGHT: 178.56 LBS | RESPIRATION RATE: 18 BRPM | TEMPERATURE: 98 F | DIASTOLIC BLOOD PRESSURE: 86 MMHG | HEIGHT: 60 IN | BODY MASS INDEX: 35.05 KG/M2 | HEART RATE: 90 BPM

## 2018-11-28 DIAGNOSIS — M32.9 SYSTEMIC LUPUS ERYTHEMATOSUS, UNSPECIFIED SLE TYPE, UNSPECIFIED ORGAN INVOLVEMENT STATUS (HCC): ICD-10-CM

## 2018-11-28 DIAGNOSIS — M85.80 OSTEOPENIA, UNSPECIFIED LOCATION: ICD-10-CM

## 2018-11-28 DIAGNOSIS — C50.911 MALIGNANT NEOPLASM OF RIGHT BREAST IN FEMALE, ESTROGEN RECEPTOR POSITIVE, UNSPECIFIED SITE OF BREAST (HCC): Primary | ICD-10-CM

## 2018-11-28 DIAGNOSIS — Z17.0 MALIGNANT NEOPLASM OF RIGHT BREAST IN FEMALE, ESTROGEN RECEPTOR POSITIVE, UNSPECIFIED SITE OF BREAST (HCC): Primary | ICD-10-CM

## 2018-11-28 PROCEDURE — 99214 OFFICE O/P EST MOD 30 MIN: CPT | Performed by: INTERNAL MEDICINE

## 2018-11-28 RX ORDER — LETROZOLE 2.5 MG/1
2.5 TABLET, FILM COATED ORAL DAILY
Qty: 30 TABLET | Refills: 5 | Status: SHIPPED | OUTPATIENT
Start: 2018-11-28 | End: 2019-05-13

## 2018-11-28 NOTE — PROGRESS NOTES
Cancer Center Progress Note    Patient Name: Naz Jiménez   YOB: 1955   Medical Record Number: [id number]   Attending Physician: Radha Huggins M.D.      Chief Complaint:  Breast cancer    History of Present Illness:  Cancer history:  66 ye • HYSTERECTOMY     • OSCAR NEEDLE LOCALIZATION W/ SPECIMEN 1 SITE RIGHT  1992   • OTHER SURGICAL HISTORY  11/01/2018    right breast mastecetomy       Family History:  Family History   Problem Relation Age of Onset   • Prostate Cancer Father 62        d.62 Asked        Sleep Concern: Not Asked        Stress Concern: Not Asked        Weight Concern: Not Asked        Special Diet: Not Asked        Back Care: Not Asked        Exercise: Not Asked        Bike Helmet: Not Asked        Seat Belt: Not Asked        S 159/86 (BP Location: Left arm, Patient Position: Sitting)   Pulse 90   Temp 98.3 °F (36.8 °C) (Oral)   Resp 18   Ht 1.524 m (5')   Wt 81 kg (178 lb 9.2 oz)   BMI 34.88 kg/m²     Physical Examination:  General: Patient is alert and oriented x 3, not in acut has underlying history of SLE She has history of osteopenia/osteoporosis.   –Based on her Oncotype score do not recommend adjuvant chemotherapy  – She will meet with radiation oncology to discuss adjuvant radiotherapy given her node positive disease however

## 2018-11-29 ENCOUNTER — OFFICE VISIT (OUTPATIENT)
Dept: PHYSICAL THERAPY | Facility: HOSPITAL | Age: 63
End: 2018-11-29
Attending: SURGERY
Payer: MEDICARE

## 2018-11-29 ENCOUNTER — TELEPHONE (OUTPATIENT)
Dept: RADIATION ONCOLOGY | Facility: HOSPITAL | Age: 63
End: 2018-11-29

## 2018-11-29 PROCEDURE — 97140 MANUAL THERAPY 1/> REGIONS: CPT

## 2018-11-29 PROCEDURE — 97530 THERAPEUTIC ACTIVITIES: CPT

## 2018-11-29 PROCEDURE — 97110 THERAPEUTIC EXERCISES: CPT

## 2018-11-29 NOTE — PROGRESS NOTES
Referring Physician: Dr. Carlee Goldstein  Diagnosis: Lymphedema (I89.0)  Date of Onset: 11/1/18  Insurance: [address] [facility] Date of Evaluation: 11/26/18  Date of Order: 11/23/18         # of Authorized Visits:  10     Next MD visit/plan renewal:  10th visit or s 35 minutes): Provided MLD for RUE:  Neck sequence, deep breathing, L axilla, L A-A, R inguinal, R A-I, R trunk, R below chest incision, R UE.  STM to RUE to release cording. No cavitation felt but improved mobility noted at end of session.   Will initiate

## 2018-11-29 NOTE — TELEPHONE ENCOUNTER
Called pt and scheduled a re-evaluation with Dr Kay Galicia for 12/6/18 @ 100 pm. Pt going to PT same day.

## 2018-11-30 ENCOUNTER — OFFICE VISIT (OUTPATIENT)
Dept: PHYSICAL THERAPY | Facility: HOSPITAL | Age: 63
End: 2018-11-30
Attending: SURGERY
Payer: MEDICARE

## 2018-11-30 DIAGNOSIS — I89.0 LYMPHEDEMA: Primary | ICD-10-CM

## 2018-11-30 DIAGNOSIS — C50.919 BREAST CANCER (HCC): ICD-10-CM

## 2018-11-30 PROCEDURE — 97140 MANUAL THERAPY 1/> REGIONS: CPT

## 2018-11-30 PROCEDURE — 97110 THERAPEUTIC EXERCISES: CPT

## 2018-11-30 NOTE — PROGRESS NOTES
Referring Physician: Dr. Rosa Cantu  Diagnosis: Lymphedema (I89.0)  Date of Onset: 11/1/18  Insurance: [address] [facility] Date of Evaluation: 11/26/18  Date of Order: 11/23/18         # of Authorized Visits:  10     Next MD visit/plan renewal:  10th visit or s 31.1 30.8   MEASUREMENT G 32.8 32.8   MEASUREMENT H 36.5 36.6   MEASUREMENT I 38.2 38.8    TOTAL VOLUME  [id number] 6755.50002   % DIFFERENCE 7.05888 1.44008          Treatment:  Includes the following components of complete decongestive therapy:    Date unaffected arm to allow patient to be fitted for compression garments  2) Pt to be independent with self MLD, ROM exercises, and donning/doffing compression bandages/garments to facilitate swelling reduction and to be independent at self management [facility]

## 2018-12-03 ENCOUNTER — OFFICE VISIT (OUTPATIENT)
Dept: PHYSICAL THERAPY | Facility: HOSPITAL | Age: 63
End: 2018-12-03
Attending: SURGERY
Payer: MEDICARE

## 2018-12-03 PROCEDURE — 97140 MANUAL THERAPY 1/> REGIONS: CPT

## 2018-12-03 PROCEDURE — 97110 THERAPEUTIC EXERCISES: CPT

## 2018-12-03 NOTE — PROGRESS NOTES
Referring Physician: Dr. Araceli Harrell  Diagnosis: Lymphedema (I89.0)  Date of Onset: 11/1/18  Insurance: [address] [facility] Date of Evaluation: 11/26/18  Date of Order: 11/23/18         # of Authorized Visits:  10     Next MD visit/plan renewal:  10th visit or s or return to therapist to discuss. Additional information of treatment:    Manual Therapy ( 35 minutes): Re-measured BUE volume.   Provided MLD for RUE:  Neck sequence, deep breathing, L axilla, L A-A, R inguinal, R A-I, R trunk, R below chest incision Ex1  Total Timed Treatment:50 min  Total Treatment Time: 50 min

## 2018-12-05 ENCOUNTER — TELEPHONE (OUTPATIENT)
Dept: PHYSICAL THERAPY | Facility: HOSPITAL | Age: 63
End: 2018-12-05

## 2018-12-05 NOTE — TELEPHONE ENCOUNTER
Returned pt's call- pt stating she got new compression sleeve and wore it all day yesterday and today, now noted bump in her arm. Pt reporting it's a small lump just below the elbow.  Discussed w/ pt it seems like the area where the cording broke, explained

## 2018-12-06 ENCOUNTER — OFFICE VISIT (OUTPATIENT)
Dept: RADIATION ONCOLOGY | Facility: HOSPITAL | Age: 63
End: 2018-12-06
Attending: RADIOLOGY
Payer: MEDICARE

## 2018-12-06 ENCOUNTER — OFFICE VISIT (OUTPATIENT)
Dept: PHYSICAL THERAPY | Facility: HOSPITAL | Age: 63
End: 2018-12-06
Attending: SURGERY
Payer: MEDICARE

## 2018-12-06 VITALS
BODY MASS INDEX: 36 KG/M2 | RESPIRATION RATE: 16 BRPM | TEMPERATURE: 98 F | WEIGHT: 181.81 LBS | OXYGEN SATURATION: 100 % | HEART RATE: 82 BPM | DIASTOLIC BLOOD PRESSURE: 75 MMHG | SYSTOLIC BLOOD PRESSURE: 147 MMHG

## 2018-12-06 PROCEDURE — 97140 MANUAL THERAPY 1/> REGIONS: CPT

## 2018-12-06 PROCEDURE — 97110 THERAPEUTIC EXERCISES: CPT

## 2018-12-06 PROCEDURE — 99211 OFF/OP EST MAY X REQ PHY/QHP: CPT

## 2018-12-06 PROCEDURE — 97530 THERAPEUTIC ACTIVITIES: CPT

## 2018-12-06 NOTE — PROGRESS NOTES
Skin Plan Of Care:    Skin Problem:  Potential for impaired skin integrity    Problems related to:    Side effects of radiation therapy    Interventions:   Instruct patient on skin care Instruct patient on additional skin care measures during radiation ther

## 2018-12-06 NOTE — PROGRESS NOTES
Referring Physician: Dr. Melva Robison  Diagnosis: Lymphedema (I89.0)  Date of Onset: 11/1/18  Insurance: [address] [facility] Date of Evaluation: 11/26/18  Date of Order: 11/23/18         # of Authorized Visits:  10     Next MD visit/plan renewal:  10th visit or s Exercise x x x x      Education x x x x      X = done this date   Exercises: pt verbalized understanding and demonstrated competence. All exercises done B unless otherwise indicated.    Pt advised to discontinue exercises that increase pain and to call or reduction and to be independent at self management once discharged  3) Increase right shoulder AROM to at least 165 degrees flex and abd to improve ease of dressing/bathing/and reaching overhead  4) Increase UE strength to at least 4/5 to improve ease of l

## 2018-12-06 NOTE — PROGRESS NOTES
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       Dear  breakfast. Disp: 30 tablet Rfl: 0   folic acid 1 MG Oral Tab Take by mouth daily.  Disp:  Rfl:    Pantoprazole Sodium 40 MG Oral Tab EC Take 1 tablet (40 mg total) by mouth every morning before breakfast. Disp: 90 tablet Rfl: 3   FLUTICASONE PROPIONATE 52 M surgical history (11/01/2018) (right breast mastecetomy). PAST SOCIAL HISTORY   reports that  has never smoked. she has never used smokeless tobacco. She reports that she does not drink alcohol or use drugs.     PAST FAMILY HISTORY   family history inclu The potential side effects and  risks were discussed and all questions were answered. At this time the patient would like to proceed with a course of treatment. Therefore, I will plan a XRT mapping session shortly and will keep you updated.       Thank Weak and incomplete membrane staining in more than 10% of tumor cells  2+ (Equivocal): Weak to moderate complete membranous staining in more than 10% of tumor cells.   3+ (Positive): Strong complete staining in more than 10% of tumor cells     All antibodie HER-2/jean pierre by immunohistochemistry is pending and will be reported in an addendum.         [id number]  Estrogen receptor (Leica, monoclonal, clone 6 F11) status: 95% (Strong intensity)  Progesterone receptor (Leica, monoclonal, clone 16) status: 90% (Strong Effect  No known presurgical therapy    MARGINS   Invasive Carcinoma Margins  Uninvolved by invasive carcinoma    Distance from Posterior Margin in Millimeters (mm)  12 Millimeters (mm)   Distance from Superior Margin in Millimeters (mm)  25 Millimeters (m diagnosis which is read by Dr. Nicole Dow to Dr. Boris Padgett as \"Two lymph nodes, negative for metastatic carcinoma\". Frozen section tissue is submitted in cassettes [id number].  The remaining half of each lymph node and the remaining fibroadipose tissue submitted lateral margin, and 5.0 cm from the medial margin. The remainder of the breast tissue consists of a tan-yellow variably dense fibroconnective tissue without other evidence of discrete masses, areas of hemorrhage or necrosis.  Axillary tail is searched for l

## 2018-12-11 ENCOUNTER — OFFICE VISIT (OUTPATIENT)
Dept: PHYSICAL THERAPY | Facility: HOSPITAL | Age: 63
End: 2018-12-11
Attending: SURGERY
Payer: MEDICARE

## 2018-12-11 ENCOUNTER — TELEPHONE (OUTPATIENT)
Dept: INTERNAL MEDICINE CLINIC | Facility: CLINIC | Age: 63
End: 2018-12-11

## 2018-12-11 PROCEDURE — 77334 RADIATION TREATMENT AID(S): CPT | Performed by: RADIOLOGY

## 2018-12-11 PROCEDURE — 97140 MANUAL THERAPY 1/> REGIONS: CPT

## 2018-12-11 PROCEDURE — 97110 THERAPEUTIC EXERCISES: CPT

## 2018-12-11 PROCEDURE — 77290 THER RAD SIMULAJ FIELD CPLX: CPT | Performed by: RADIOLOGY

## 2018-12-11 PROCEDURE — 77332 RADIATION TREATMENT AID(S): CPT | Performed by: RADIOLOGY

## 2018-12-11 RX ORDER — CEPHALEXIN 500 MG/1
500 CAPSULE ORAL 2 TIMES DAILY
Qty: 14 CAPSULE | Refills: 0 | Status: SHIPPED | OUTPATIENT
Start: 2018-12-11 | End: 2019-02-14 | Stop reason: ALTCHOICE

## 2018-12-11 NOTE — TELEPHONE ENCOUNTER
Spoke with patient. She reports she developed UTI symptoms on Friday night. Reports frequent urination, pelvic pain, and a little burning with urination. Denies fevers/chills/flank pain. Reports she is recovering from breast CA surgery--she is 5 weeks out.

## 2018-12-11 NOTE — TELEPHONE ENCOUNTER
Pt is calling with a possible UTI and would like something to be prescribed to her over the phone. Pt was informed that Dr John Paniagua is out of the office today and the pt would like another doctor to look into this sooner.  Best call back is [phone]    Pt stat

## 2018-12-11 NOTE — PROGRESS NOTES
Referring Physician: Dr. Lalo Devlin  Diagnosis: Lymphedema (I89.0)  Date of Onset: 11/1/18  Insurance: [address] [facility] Date of Evaluation: 11/26/18  Date of Order: 11/23/18         # of Authorized Visits:  10     Next MD visit/plan renewal:  10th visit or s x x x X reviewed that needs to be done at night now that she is wearing compression x     Exercise x x x x x     Education x x x x x     X = done this date   Exercises: pt verbalized understanding and demonstrated competence.  All exercises done B unless ot pump.    Goals:   Goals (discussed and planned with patient involvement):       To be met in 10 visits:  1) Decrease swelling to be less than 10 % greater than unaffected arm to allow patient to be fitted for compression garments - will measure next week  2

## 2018-12-12 PROCEDURE — 77295 3-D RADIOTHERAPY PLAN: CPT | Performed by: RADIOLOGY

## 2018-12-12 PROCEDURE — 77300 RADIATION THERAPY DOSE PLAN: CPT | Performed by: RADIOLOGY

## 2018-12-12 PROCEDURE — 77334 RADIATION TREATMENT AID(S): CPT | Performed by: RADIOLOGY

## 2018-12-14 ENCOUNTER — OFFICE VISIT (OUTPATIENT)
Dept: PHYSICAL THERAPY | Facility: HOSPITAL | Age: 63
End: 2018-12-14
Attending: SURGERY
Payer: MEDICARE

## 2018-12-14 PROCEDURE — 97110 THERAPEUTIC EXERCISES: CPT

## 2018-12-14 PROCEDURE — 97140 MANUAL THERAPY 1/> REGIONS: CPT

## 2018-12-14 NOTE — PROGRESS NOTES
Referring Physician: Dr. Alejandra Newberry  Diagnosis: Lymphedema (I89.0)  Date of Onset: 11/1/18  Insurance: [address] [facility] Date of Evaluation: 11/26/18  Date of Order: 11/23/18         # of Authorized Visits:  10     Next MD visit/plan renewal:  10th visit or s glove.  Assessed fit and donning technique  x x    Skin Care x x x X reviewed that needs to be done at night now that she is wearing compression x     Exercise x x x x x x    Education x x x x x x    X = done this date   Exercises: pt verbalized understand need custom compression but will try OTS product and continue to assess containment and skin quality. Pt may also benefit from home compression pump. Goals:   Goals (discussed and planned with patient involvement):       To be met in 10 visits:  1) [place]

## 2018-12-17 ENCOUNTER — OFFICE VISIT (OUTPATIENT)
Dept: PHYSICAL THERAPY | Facility: HOSPITAL | Age: 63
End: 2018-12-17
Attending: SURGERY
Payer: MEDICARE

## 2018-12-17 PROCEDURE — 97140 MANUAL THERAPY 1/> REGIONS: CPT

## 2018-12-17 PROCEDURE — 97110 THERAPEUTIC EXERCISES: CPT

## 2018-12-17 NOTE — PROGRESS NOTES
Referring Physician: Dr. Ron Major  Diagnosis: Lymphedema (I89.0)  Date of Onset: 11/1/18  Insurance: [address] [facility] Date of Evaluation: 11/26/18  Date of Order: 11/23/18         # of Authorized Visits:  10     Next MD visit/plan renewal:  10th visit or s that she is wearing compression x  reviewed   Exercise x x x x x x x   Education x x x x x x x   X = done this date   Exercises: pt verbalized understanding and demonstrated competence. All exercises done B unless otherwise indicated.    Pt advised to disco custom compression but will try OTS product and continue to assess containment and skin quality. Pt may also benefit from home compression pump. Goals:   Goals (discussed and planned with patient involvement):       To be met in 10 visits:  1) Decrease

## 2018-12-20 ENCOUNTER — OFFICE VISIT (OUTPATIENT)
Dept: PHYSICAL THERAPY | Facility: HOSPITAL | Age: 63
End: 2018-12-20
Attending: SURGERY
Payer: MEDICARE

## 2018-12-20 PROCEDURE — 97140 MANUAL THERAPY 1/> REGIONS: CPT

## 2018-12-20 PROCEDURE — 97110 THERAPEUTIC EXERCISES: CPT

## 2018-12-20 NOTE — PROGRESS NOTES
Referring Physician: Dr. Melva Roibson  Diagnosis: Lymphedema (I89.0)  Date of Onset: 11/1/18  Insurance: [address] [facility] Date of Evaluation: 11/26/18  Date of Order: 11/23/18         # of Authorized Visits:  10     Next MD visit/plan renewal:  10th visit or s fit and donning technique  x x x x   Skin Care x x x X reviewed that needs to be done at night now that she is wearing compression x  reviewed    Exercise x x x x x x x x   Education x x x x x x x x   X = done this date   Exercises: pt verbalized [place] fitted for compression garments - will measure next week  2) Pt to be independent with self MLD, ROM exercises, and donning/doffing compression bandages/garments to facilitate swelling reduction and to be independent at self management once discharged - wo

## 2018-12-26 ENCOUNTER — OFFICE VISIT (OUTPATIENT)
Dept: PHYSICAL THERAPY | Facility: HOSPITAL | Age: 63
End: 2018-12-26
Attending: SURGERY
Payer: MEDICARE

## 2018-12-26 PROCEDURE — 97110 THERAPEUTIC EXERCISES: CPT | Performed by: PHYSICAL THERAPIST

## 2018-12-26 PROCEDURE — 97140 MANUAL THERAPY 1/> REGIONS: CPT | Performed by: PHYSICAL THERAPIST

## 2018-12-26 NOTE — PROGRESS NOTES
Referring Physician: Dr. Park Portillo  Diagnosis: Lymphedema (I89.0)  Date of Onset: 11/1/18  Insurance: [facility] Date of Evaluation: 11/26/18  Date of Order: 11/23/18       Progress Summary    Pt has attended 10 visits in Physical Therapy.      Progrhoracio \"Arm is getting better but now I have pain down my side\" 4/10    Subjective:   Pt reporting new pain right trunk, \"but I can lift my arm higher\"      Objective:    OBSERVATIONS:  RUE edema mostly at forearm, pitting, some fibrosis  Decreased flexibil DATE MEASURED 12/26/2018 10/30/2018 10/30/2018   LOCATION/MEASUREMENTS LUE LUE LUE   MEASUREMENT A 16.2 16.3 16   MEASUREMENT B 21.2 20.2 19.4   MEASUREMENT C 25.6 24.8 24.8   MEASUREMENT D 28.2 28.1 27.9   MEASUREMENT E 28.8 28 27.4   MEASUREMENT F 31. 9 to improve compression/containment        There Ex ( 10 minutes):   AA/PROM with tissue stretch to axilla multiple planes  Doorway stretching for latissimus      There Act (0 minutes):       Assessment:   Improved ROM after treatment.    Patient would benef

## 2018-12-27 PROCEDURE — 77332 RADIATION TREATMENT AID(S): CPT | Performed by: RADIOLOGY

## 2018-12-27 PROCEDURE — 77280 THER RAD SIMULAJ FIELD SMPL: CPT | Performed by: RADIOLOGY

## 2018-12-27 PROCEDURE — 77412 RADIATION TX DELIVERY LVL 3: CPT | Performed by: RADIOLOGY

## 2018-12-28 PROCEDURE — 77331 SPECIAL RADIATION DOSIMETRY: CPT | Performed by: RADIOLOGY

## 2018-12-28 PROCEDURE — 77412 RADIATION TX DELIVERY LVL 3: CPT | Performed by: RADIOLOGY

## 2018-12-31 ENCOUNTER — APPOINTMENT (OUTPATIENT)
Dept: RADIATION ONCOLOGY | Facility: HOSPITAL | Age: 63
End: 2018-12-31
Attending: RADIOLOGY
Payer: MEDICARE

## 2018-12-31 ENCOUNTER — OFFICE VISIT (OUTPATIENT)
Dept: PHYSICAL THERAPY | Facility: HOSPITAL | Age: 63
End: 2018-12-31
Attending: SURGERY
Payer: MEDICARE

## 2018-12-31 PROCEDURE — 97110 THERAPEUTIC EXERCISES: CPT | Performed by: PHYSICAL THERAPIST

## 2018-12-31 PROCEDURE — 97140 MANUAL THERAPY 1/> REGIONS: CPT | Performed by: PHYSICAL THERAPIST

## 2018-12-31 NOTE — PROGRESS NOTES
Referring Physician: Dr. Shaw Burgos  Diagnosis: Lymphedema (I89.0)  Date of Onset: 11/1/18  Insurance: [facility] [facility] Date of Evaluation: 11/26/18  Date of Order: 11/23/18     # of Authorized Visits:  10     Next MD visit/plan renewal:  10th visit or soone latissimus, pect, and chest to improve tissue mobility    Assisted donning compression sleeve and glove, size C tensogrip over forearm for additional compression/containment.      There Ex ( 10 minutes):   AA/PROM with tissue stretch to axilla multiple plan

## 2019-01-01 ENCOUNTER — NURSE ONLY (OUTPATIENT)
Dept: RADIATION ONCOLOGY | Facility: HOSPITAL | Age: 64
End: 2019-01-01
Attending: RADIOLOGY
Payer: MEDICARE

## 2019-01-02 ENCOUNTER — OFFICE VISIT (OUTPATIENT)
Dept: RADIATION ONCOLOGY | Facility: HOSPITAL | Age: 64
End: 2019-01-02
Attending: RADIOLOGY
Payer: MEDICARE

## 2019-01-02 VITALS
RESPIRATION RATE: 16 BRPM | DIASTOLIC BLOOD PRESSURE: 70 MMHG | SYSTOLIC BLOOD PRESSURE: 139 MMHG | HEART RATE: 77 BPM | TEMPERATURE: 98 F

## 2019-01-02 PROCEDURE — 77412 RADIATION TX DELIVERY LVL 3: CPT | Performed by: RADIOLOGY

## 2019-01-02 RX ORDER — MOMETASONE FUROATE 1 MG/G
1 CREAM TOPICAL DAILY
Qty: 45 G | Refills: 2 | Status: SHIPPED | OUTPATIENT
Start: 2019-01-02 | End: 2019-08-01

## 2019-01-02 NOTE — PROGRESS NOTES
[facility] Radiation Treatment Management Note 1-5    Patient:  Mamta Pierce  Age:  66year old  Visit Diagnosis:  No diagnosis found.   Primary Rad/Onc:  Dr. Serafin Cramer Griffin    Site Delivered Dose (Gy) Prescribed Dose (Gy) Fraction #

## 2019-01-03 PROCEDURE — 77412 RADIATION TX DELIVERY LVL 3: CPT | Performed by: RADIOLOGY

## 2019-01-04 PROCEDURE — 77336 RADIATION PHYSICS CONSULT: CPT | Performed by: RADIOLOGY

## 2019-01-04 PROCEDURE — 77412 RADIATION TX DELIVERY LVL 3: CPT | Performed by: RADIOLOGY

## 2019-01-07 ENCOUNTER — OFFICE VISIT (OUTPATIENT)
Dept: RADIATION ONCOLOGY | Facility: HOSPITAL | Age: 64
End: 2019-01-07
Attending: RADIOLOGY
Payer: MEDICARE

## 2019-01-07 ENCOUNTER — OFFICE VISIT (OUTPATIENT)
Dept: PHYSICAL THERAPY | Facility: HOSPITAL | Age: 64
End: 2019-01-07
Attending: SURGERY
Payer: MEDICARE

## 2019-01-07 VITALS
TEMPERATURE: 98 F | SYSTOLIC BLOOD PRESSURE: 135 MMHG | DIASTOLIC BLOOD PRESSURE: 79 MMHG | RESPIRATION RATE: 16 BRPM | HEART RATE: 75 BPM

## 2019-01-07 PROCEDURE — 77412 RADIATION TX DELIVERY LVL 3: CPT | Performed by: RADIOLOGY

## 2019-01-07 PROCEDURE — 77417 THER RADIOLOGY PORT IMAGE(S): CPT | Performed by: RADIOLOGY

## 2019-01-07 PROCEDURE — 97140 MANUAL THERAPY 1/> REGIONS: CPT

## 2019-01-07 PROCEDURE — 97110 THERAPEUTIC EXERCISES: CPT

## 2019-01-07 NOTE — PROGRESS NOTES
Referring Physician: Dr. Nae Knight  Diagnosis: Lymphedema (I89.0)  Date of Onset: 11/1/18  Insurance: [facility] [address] [facility] Date of Evaluation: 11/26/18  Date of Order: 11/23/18     # of Authorized Visits:  10     Next MD visit/plan renewal:  10th visit or soone to discuss. Additional information of treatment:    Manual Therapy (35 minutes):    MLD  as previously outlined.  Deep technique to forearm   STM to R latissimus, pect, and chest to improve tissue mobility    Assisted donning compression sleeve and demetrius

## 2019-01-07 NOTE — PROGRESS NOTES
[facility] Radiation Treatment Management Note 6-10    Patient:  Leola Deng  Age:  66year old  Visit Diagnosis:  No diagnosis found.   Primary Rad/Onc:  Dr. Farhad Laird Griffin    Site Delivered Dose (Gy) Prescribed Dose (Gy) Fraction #

## 2019-01-08 PROCEDURE — 77412 RADIATION TX DELIVERY LVL 3: CPT | Performed by: RADIOLOGY

## 2019-01-09 PROCEDURE — 77412 RADIATION TX DELIVERY LVL 3: CPT | Performed by: RADIOLOGY

## 2019-01-10 PROCEDURE — 77412 RADIATION TX DELIVERY LVL 3: CPT | Performed by: RADIOLOGY

## 2019-01-11 PROCEDURE — 77412 RADIATION TX DELIVERY LVL 3: CPT | Performed by: RADIOLOGY

## 2019-01-11 PROCEDURE — 77336 RADIATION PHYSICS CONSULT: CPT | Performed by: RADIOLOGY

## 2019-01-14 ENCOUNTER — OFFICE VISIT (OUTPATIENT)
Dept: RADIATION ONCOLOGY | Facility: HOSPITAL | Age: 64
End: 2019-01-14
Attending: RADIOLOGY
Payer: MEDICARE

## 2019-01-14 VITALS
TEMPERATURE: 98 F | RESPIRATION RATE: 16 BRPM | SYSTOLIC BLOOD PRESSURE: 137 MMHG | DIASTOLIC BLOOD PRESSURE: 77 MMHG | HEART RATE: 78 BPM

## 2019-01-14 DIAGNOSIS — Z17.0 MALIGNANT NEOPLASM OF RIGHT BREAST IN FEMALE, ESTROGEN RECEPTOR POSITIVE, UNSPECIFIED SITE OF BREAST (HCC): Primary | ICD-10-CM

## 2019-01-14 DIAGNOSIS — C50.911 MALIGNANT NEOPLASM OF RIGHT BREAST IN FEMALE, ESTROGEN RECEPTOR POSITIVE, UNSPECIFIED SITE OF BREAST (HCC): Primary | ICD-10-CM

## 2019-01-14 PROCEDURE — 77417 THER RADIOLOGY PORT IMAGE(S): CPT | Performed by: RADIOLOGY

## 2019-01-14 PROCEDURE — 77412 RADIATION TX DELIVERY LVL 3: CPT | Performed by: RADIOLOGY

## 2019-01-14 NOTE — PROGRESS NOTES
[facility] Radiation Treatment Management Note 11-15    Patient:  Michelle Casiano  Age:  66year old  Visit Diagnosis:    1.  Malignant neoplasm of right breast in female, estrogen receptor positive, unspecified site of breast ([facility] [address]

## 2019-01-15 PROCEDURE — 77412 RADIATION TX DELIVERY LVL 3: CPT | Performed by: RADIOLOGY

## 2019-01-16 ENCOUNTER — OFFICE VISIT (OUTPATIENT)
Dept: PHYSICAL THERAPY | Facility: HOSPITAL | Age: 64
End: 2019-01-16
Attending: SURGERY
Payer: MEDICARE

## 2019-01-16 PROCEDURE — 97110 THERAPEUTIC EXERCISES: CPT | Performed by: PHYSICAL THERAPIST

## 2019-01-16 PROCEDURE — 77412 RADIATION TX DELIVERY LVL 3: CPT | Performed by: RADIOLOGY

## 2019-01-16 PROCEDURE — 97140 MANUAL THERAPY 1/> REGIONS: CPT | Performed by: PHYSICAL THERAPIST

## 2019-01-16 NOTE — PROGRESS NOTES
Referring Physician: Dr. Alejandra Newberry  Diagnosis: Lymphedema (I89.0)  Date of Onset: 11/1/18  Insurance: [facility] [address] [facility] Date of Evaluation: 11/26/18  Date of Order: 11/23/18     # of Authorized Visits:  10     Next MD visit/plan renewal:  10th visit or soone 15. 6   MEASUREMENT B 21.1 21.8 20.8   MEASUREMENT C 26.3 27.2 26   MEASUREMENT D 29 29.1 28.1   MEASUREMENT E 28 28 27   MEASUREMENT F 31.3 31.1 29.5   MEASUREMENT G 33.3 33.7 32.6   MEASUREMENT H 36.8 38.3 38.1   MEASUREMENT I 37.8 40 39    TOTAL VOLUME verbal correction for hold time to improve stretch      There Act (0 minutes)      Assessment:   Less swelling and improved ROM.  Patient naturally w/ larger RUE (noted pre-sx), patient also w/ naturally heavy forearms and upper arms bilaterally which make

## 2019-01-17 PROCEDURE — 77412 RADIATION TX DELIVERY LVL 3: CPT | Performed by: RADIOLOGY

## 2019-01-18 ENCOUNTER — OFFICE VISIT (OUTPATIENT)
Dept: PHYSICAL THERAPY | Facility: HOSPITAL | Age: 64
End: 2019-01-18
Attending: SURGERY
Payer: MEDICARE

## 2019-01-18 PROCEDURE — 77336 RADIATION PHYSICS CONSULT: CPT | Performed by: RADIOLOGY

## 2019-01-18 PROCEDURE — 77412 RADIATION TX DELIVERY LVL 3: CPT | Performed by: RADIOLOGY

## 2019-01-18 PROCEDURE — 97110 THERAPEUTIC EXERCISES: CPT

## 2019-01-18 PROCEDURE — 97140 MANUAL THERAPY 1/> REGIONS: CPT

## 2019-01-18 NOTE — PROGRESS NOTES
Referring Physician: Dr. Aaron Tubbs  Diagnosis: Lymphedema (I89.0)  Date of Onset: 11/1/18  Insurance: [facility] Date of Evaluation: 11/26/18  Date of Order: 11/23/18     # of Authorized Visits:  10     Next MD visit/plan renewal:  10th visit or soone x x x    X = done this date   Exercises: pt verbalized understanding and demonstrated competence. All exercises done B unless otherwise indicated. Pt advised to discontinue exercises that increase pain and to call or return to therapist to discuss. met        Plan:   Continue with PT to improve swelling, ROM, strength  Improve tissue mobility     Charges: MM4, ex1  Total Timed Treatment:  75 min  Total Treatment Time: 75 min

## 2019-01-21 ENCOUNTER — OFFICE VISIT (OUTPATIENT)
Dept: RADIATION ONCOLOGY | Facility: HOSPITAL | Age: 64
End: 2019-01-21
Attending: RADIOLOGY
Payer: MEDICARE

## 2019-01-21 VITALS
HEART RATE: 81 BPM | TEMPERATURE: 98 F | RESPIRATION RATE: 16 BRPM | DIASTOLIC BLOOD PRESSURE: 83 MMHG | SYSTOLIC BLOOD PRESSURE: 140 MMHG

## 2019-01-21 DIAGNOSIS — C50.911 MALIGNANT NEOPLASM OF RIGHT BREAST IN FEMALE, ESTROGEN RECEPTOR POSITIVE, UNSPECIFIED SITE OF BREAST (HCC): Primary | ICD-10-CM

## 2019-01-21 DIAGNOSIS — Z17.0 MALIGNANT NEOPLASM OF RIGHT BREAST IN FEMALE, ESTROGEN RECEPTOR POSITIVE, UNSPECIFIED SITE OF BREAST (HCC): Primary | ICD-10-CM

## 2019-01-21 PROCEDURE — 77412 RADIATION TX DELIVERY LVL 3: CPT | Performed by: RADIOLOGY

## 2019-01-21 PROCEDURE — 77417 THER RADIOLOGY PORT IMAGE(S): CPT | Performed by: RADIOLOGY

## 2019-01-21 NOTE — PROGRESS NOTES
[facility] Radiation Treatment Management Note 16-20    Patient:  Tracy Cao  Age:  66year old  Visit Diagnosis:    1.  Malignant neoplasm of right breast in female, estrogen receptor positive, unspecified site of breast ([facility] [address]

## 2019-01-22 PROCEDURE — 77412 RADIATION TX DELIVERY LVL 3: CPT | Performed by: RADIOLOGY

## 2019-01-23 ENCOUNTER — APPOINTMENT (OUTPATIENT)
Dept: HEMATOLOGY/ONCOLOGY | Facility: HOSPITAL | Age: 64
End: 2019-01-23
Attending: GENETIC COUNSELOR, MS
Payer: MEDICARE

## 2019-01-23 ENCOUNTER — OFFICE VISIT (OUTPATIENT)
Dept: PHYSICAL THERAPY | Facility: HOSPITAL | Age: 64
End: 2019-01-23
Attending: SURGERY
Payer: MEDICARE

## 2019-01-23 PROCEDURE — 77412 RADIATION TX DELIVERY LVL 3: CPT | Performed by: RADIOLOGY

## 2019-01-23 PROCEDURE — 97140 MANUAL THERAPY 1/> REGIONS: CPT | Performed by: PHYSICAL THERAPIST

## 2019-01-23 NOTE — PROGRESS NOTES
Referring Physician: Dr. April Masters  Diagnosis: Lymphedema (I89.0)  Date of Onset: 11/1/18  Insurance: [facility] [address] [facility] Date of Evaluation: 11/26/18  Date of Order: 11/23/18     # of Authorized Visits:  10     Next MD visit/plan renewal:  10th visit or soone discontinue exercises that increase pain and to call or return to therapist to discuss.       Additional information of treatment:    Manual Therapy (60 minutes):  STM to R pect, chest, axilla, forearm and medial upper arm to improve tissue mobility and to

## 2019-01-24 PROCEDURE — 77412 RADIATION TX DELIVERY LVL 3: CPT | Performed by: RADIOLOGY

## 2019-01-25 ENCOUNTER — OFFICE VISIT (OUTPATIENT)
Dept: PHYSICAL THERAPY | Facility: HOSPITAL | Age: 64
End: 2019-01-25
Attending: SURGERY
Payer: MEDICARE

## 2019-01-25 PROCEDURE — 97140 MANUAL THERAPY 1/> REGIONS: CPT

## 2019-01-25 PROCEDURE — 77336 RADIATION PHYSICS CONSULT: CPT | Performed by: RADIOLOGY

## 2019-01-25 PROCEDURE — 77412 RADIATION TX DELIVERY LVL 3: CPT | Performed by: RADIOLOGY

## 2019-01-25 NOTE — PROGRESS NOTES
Referring Physician: Dr. Karo Bosch  Diagnosis: Lymphedema (I89.0)  Date of Onset: 11/1/18  Insurance: [facility] [address] [facility] Date of Evaluation: 11/26/18  Date of Order: 11/23/18     # of Authorized Visits:  10     Next MD visit/plan renewal:  10th visit or soone x x x x x   X = done this date   Exercises: pt verbalized understanding and demonstrated competence. All exercises done B unless otherwise indicated. Pt advised to discontinue exercises that increase pain and to call or return to therapist to discuss. Treatment: 65 min  Total Treatment Time: 65 min

## 2019-01-28 ENCOUNTER — OFFICE VISIT (OUTPATIENT)
Dept: RADIATION ONCOLOGY | Facility: HOSPITAL | Age: 64
End: 2019-01-28
Attending: RADIOLOGY
Payer: MEDICARE

## 2019-01-28 VITALS — DIASTOLIC BLOOD PRESSURE: 83 MMHG | SYSTOLIC BLOOD PRESSURE: 141 MMHG | HEART RATE: 77 BPM | RESPIRATION RATE: 16 BRPM

## 2019-01-28 DIAGNOSIS — Z17.0 MALIGNANT NEOPLASM OF RIGHT BREAST IN FEMALE, ESTROGEN RECEPTOR POSITIVE, UNSPECIFIED SITE OF BREAST (HCC): Primary | ICD-10-CM

## 2019-01-28 DIAGNOSIS — C50.911 MALIGNANT NEOPLASM OF RIGHT BREAST IN FEMALE, ESTROGEN RECEPTOR POSITIVE, UNSPECIFIED SITE OF BREAST (HCC): Primary | ICD-10-CM

## 2019-01-28 PROCEDURE — 77417 THER RADIOLOGY PORT IMAGE(S): CPT | Performed by: RADIOLOGY

## 2019-01-28 PROCEDURE — 77412 RADIATION TX DELIVERY LVL 3: CPT | Performed by: RADIOLOGY

## 2019-01-28 NOTE — PROGRESS NOTES
[facility] Radiation Treatment Management Note 21-25    Patient:  Marta White  Age:  66year old  Visit Diagnosis:    1.  Malignant neoplasm of right breast in female, estrogen receptor positive, unspecified site of breast ([facility] [address]

## 2019-01-29 PROCEDURE — 77412 RADIATION TX DELIVERY LVL 3: CPT | Performed by: RADIOLOGY

## 2019-01-29 PROCEDURE — 77290 THER RAD SIMULAJ FIELD CPLX: CPT | Performed by: RADIOLOGY

## 2019-01-29 PROCEDURE — 77321 SPECIAL TELETX PORT PLAN: CPT | Performed by: RADIOLOGY

## 2019-01-29 PROCEDURE — 77334 RADIATION TREATMENT AID(S): CPT | Performed by: RADIOLOGY

## 2019-01-30 ENCOUNTER — APPOINTMENT (OUTPATIENT)
Dept: PHYSICAL THERAPY | Facility: HOSPITAL | Age: 64
End: 2019-01-30
Attending: SURGERY
Payer: MEDICARE

## 2019-01-31 PROCEDURE — 77412 RADIATION TX DELIVERY LVL 3: CPT | Performed by: RADIOLOGY

## 2019-02-01 ENCOUNTER — NURSE ONLY (OUTPATIENT)
Dept: RADIATION ONCOLOGY | Facility: HOSPITAL | Age: 64
End: 2019-02-01
Attending: RADIOLOGY
Payer: MEDICARE

## 2019-02-01 PROCEDURE — 77412 RADIATION TX DELIVERY LVL 3: CPT | Performed by: RADIOLOGY

## 2019-02-01 PROCEDURE — 77336 RADIATION PHYSICS CONSULT: CPT | Performed by: RADIOLOGY

## 2019-02-04 ENCOUNTER — OFFICE VISIT (OUTPATIENT)
Dept: RADIATION ONCOLOGY | Facility: HOSPITAL | Age: 64
End: 2019-02-04
Attending: RADIOLOGY
Payer: MEDICARE

## 2019-02-04 ENCOUNTER — OFFICE VISIT (OUTPATIENT)
Dept: PHYSICAL THERAPY | Facility: HOSPITAL | Age: 64
End: 2019-02-04
Attending: SURGERY
Payer: MEDICARE

## 2019-02-04 VITALS
SYSTOLIC BLOOD PRESSURE: 143 MMHG | TEMPERATURE: 98 F | DIASTOLIC BLOOD PRESSURE: 95 MMHG | RESPIRATION RATE: 16 BRPM | HEART RATE: 78 BPM

## 2019-02-04 DIAGNOSIS — C50.911 MALIGNANT NEOPLASM OF RIGHT BREAST IN FEMALE, ESTROGEN RECEPTOR POSITIVE, UNSPECIFIED SITE OF BREAST (HCC): Primary | ICD-10-CM

## 2019-02-04 DIAGNOSIS — Z17.0 MALIGNANT NEOPLASM OF RIGHT BREAST IN FEMALE, ESTROGEN RECEPTOR POSITIVE, UNSPECIFIED SITE OF BREAST (HCC): Primary | ICD-10-CM

## 2019-02-04 PROCEDURE — 97140 MANUAL THERAPY 1/> REGIONS: CPT | Performed by: PHYSICAL THERAPIST

## 2019-02-04 PROCEDURE — 77412 RADIATION TX DELIVERY LVL 3: CPT | Performed by: RADIOLOGY

## 2019-02-04 NOTE — PROGRESS NOTES
[facility] Radiation Treatment Management Note 21-25    Patient:  Kori Bro  Age:  66year old  Visit Diagnosis:    1.  Malignant neoplasm of right breast in female, estrogen receptor positive, unspecified site of breast ([facility] [address]

## 2019-02-04 NOTE — PROGRESS NOTES
Referring Physician: Dr. Karsten Mazariegos  Diagnosis: Lymphedema (I89.0)  Date of Onset: 11/1/18  Insurance: [facility] Date of Evaluation: 11/26/18  Date of Order: 11/23/18     # of Authorized Visits:  10     Next MD visit/plan renewal:  10th visit or soone B unless otherwise indicated. Pt advised to discontinue exercises that increase pain and to call or return to therapist to discuss.       Additional information of treatment:    Manual Therapy (60 minutes):  STM to R medial upper arm, axilla, and right la

## 2019-02-05 PROCEDURE — 77280 THER RAD SIMULAJ FIELD SMPL: CPT | Performed by: RADIOLOGY

## 2019-02-05 PROCEDURE — 77332 RADIATION TREATMENT AID(S): CPT | Performed by: RADIOLOGY

## 2019-02-05 PROCEDURE — 77412 RADIATION TX DELIVERY LVL 3: CPT | Performed by: RADIOLOGY

## 2019-02-06 PROCEDURE — 77412 RADIATION TX DELIVERY LVL 3: CPT | Performed by: RADIOLOGY

## 2019-02-07 PROCEDURE — 77412 RADIATION TX DELIVERY LVL 3: CPT | Performed by: RADIOLOGY

## 2019-02-08 PROCEDURE — 77336 RADIATION PHYSICS CONSULT: CPT | Performed by: RADIOLOGY

## 2019-02-08 PROCEDURE — 77412 RADIATION TX DELIVERY LVL 3: CPT | Performed by: RADIOLOGY

## 2019-02-11 ENCOUNTER — OFFICE VISIT (OUTPATIENT)
Dept: RADIATION ONCOLOGY | Facility: HOSPITAL | Age: 64
End: 2019-02-11
Attending: RADIOLOGY
Payer: MEDICARE

## 2019-02-11 DIAGNOSIS — C50.911 MALIGNANT NEOPLASM OF RIGHT BREAST IN FEMALE, ESTROGEN RECEPTOR POSITIVE, UNSPECIFIED SITE OF BREAST (HCC): Primary | ICD-10-CM

## 2019-02-11 DIAGNOSIS — Z17.0 MALIGNANT NEOPLASM OF RIGHT BREAST IN FEMALE, ESTROGEN RECEPTOR POSITIVE, UNSPECIFIED SITE OF BREAST (HCC): Primary | ICD-10-CM

## 2019-02-11 PROCEDURE — 77412 RADIATION TX DELIVERY LVL 3: CPT | Performed by: RADIOLOGY

## 2019-02-11 NOTE — PROGRESS NOTES
[facility] Radiation Treatment Management Note 26-30    Patient:  Oscar Membreno  Age:  66year old  Visit Diagnosis:    1.  Malignant neoplasm of right breast in female, estrogen receptor positive, unspecified site of breast ([facility] [address]

## 2019-02-11 NOTE — PATIENT INSTRUCTIONS
Follow up with Dr Giuseppe García 5/29/19 @ 1000 am. Call Homero Loaiza with schedule changes. Continue to to moisturize for the next 3 weeks, SPF 30 or greater when exposed to sun. Skin will remain sun sensitive for months even years.     Call with any questions or concerns

## 2019-02-11 NOTE — PROGRESS NOTES
RADIATION ONCOLOGY COMPLETION SUMMARY NOTE    DIAGNOSIS:  Stage IIA [id number] IDC RUIQ s/p biopsy, ER 95/SC 90 Her2 neg, low oncotype Favorable Ki-67 8%, s/p mastectomy and ALND with 1/18 LN positive, s/p adjuvant XRT on 2/11/2019.     Dear Sherry Alston, My

## 2019-02-12 ENCOUNTER — APPOINTMENT (OUTPATIENT)
Dept: PHYSICAL THERAPY | Facility: HOSPITAL | Age: 64
End: 2019-02-12
Attending: SURGERY
Payer: MEDICARE

## 2019-02-14 ENCOUNTER — OFFICE VISIT (OUTPATIENT)
Dept: HEMATOLOGY/ONCOLOGY | Facility: HOSPITAL | Age: 64
End: 2019-02-14
Attending: GENETIC COUNSELOR, MS
Payer: MEDICARE

## 2019-02-14 VITALS
RESPIRATION RATE: 18 BRPM | TEMPERATURE: 98 F | DIASTOLIC BLOOD PRESSURE: 82 MMHG | HEART RATE: 79 BPM | BODY MASS INDEX: 35.53 KG/M2 | WEIGHT: 181 LBS | HEIGHT: 60 IN | SYSTOLIC BLOOD PRESSURE: 158 MMHG

## 2019-02-14 DIAGNOSIS — M85.80 OSTEOPENIA, UNSPECIFIED LOCATION: ICD-10-CM

## 2019-02-14 DIAGNOSIS — M32.9 SYSTEMIC LUPUS ERYTHEMATOSUS, UNSPECIFIED SLE TYPE, UNSPECIFIED ORGAN INVOLVEMENT STATUS (HCC): ICD-10-CM

## 2019-02-14 DIAGNOSIS — C50.911 STAGE I BREAST CANCER, RIGHT (HCC): Primary | ICD-10-CM

## 2019-02-14 PROCEDURE — 99214 OFFICE O/P EST MOD 30 MIN: CPT | Performed by: INTERNAL MEDICINE

## 2019-02-14 NOTE — PROGRESS NOTES
Cancer Center Progress Note    Patient Name: Claudine Sanchez   YOB: 1955   Medical Record Number: [id number]   Attending Physician: Jesus Kennedy M.D.      Chief Complaint:  Breast cancer    History of Present Illness:  Cancer history:  66 ye Scanned to media tab - DOS 04-   • HEMORRHOIDECTOMY     • HYSTERECTOMY     • OSCAR NEEDLE LOCALIZATION W/ SPECIMEN 1 SITE RIGHT  1992   • OTHER SURGICAL HISTORY  11/01/2018    right breast mastecetomy       Family History:  Family History   Problem Re Occupational Exposure: Not Asked        Hobby Hazards: Not Asked        Sleep Concern: Not Asked        Stress Concern: Not Asked        Weight Concern: Not Asked        Special Diet: Not Asked        Back Care: Not Asked        Exercise: Not Asked Comment:Other reaction(s):  Anaphylaxis  Relafen                 ANAPHYLAXIS     Review of Systems:  All other systems reviewed and negative x12    Vital Signs:  /82 (BP Location: Left arm, Patient Position: Sitting)   Pulse 79   Temp 97.9 °F (36.6 °C ductal carcinoma. Oncotype recurrence score was 11. One sentinel lymph node was positive. On axillary lymph node dissection 0 of 16 lymph nodes were involved. She has underlying history of SLE She has history of osteopenia/osteoporosis.   She is on metho

## 2019-02-15 PROCEDURE — 77336 RADIATION PHYSICS CONSULT: CPT | Performed by: RADIOLOGY

## 2019-02-19 ENCOUNTER — TELEPHONE (OUTPATIENT)
Dept: HEMATOLOGY/ONCOLOGY | Facility: HOSPITAL | Age: 64
End: 2019-02-19

## 2019-02-19 NOTE — TELEPHONE ENCOUNTER
Left msg on cell# asking for return call to discuss scheduling a Survivorship appt w/Olga at [facility].

## 2019-03-05 ENCOUNTER — OFFICE VISIT (OUTPATIENT)
Dept: HEMATOLOGY/ONCOLOGY | Facility: HOSPITAL | Age: 64
End: 2019-03-05
Attending: NURSE PRACTITIONER
Payer: MEDICARE

## 2019-03-05 DIAGNOSIS — Z71.9 COUNSELING, UNSPECIFIED: ICD-10-CM

## 2019-03-05 DIAGNOSIS — Z85.3 PERSONAL HISTORY OF BREAST CANCER: ICD-10-CM

## 2019-03-05 DIAGNOSIS — Z08 ENCOUNTER FOR FOLLOW-UP EXAMINATION AFTER COMPLETED TREATMENT FOR MALIGNANT NEOPLASM: Primary | ICD-10-CM

## 2019-03-05 PROCEDURE — 99215 OFFICE O/P EST HI 40 MIN: CPT | Performed by: NURSE PRACTITIONER

## 2019-03-06 NOTE — PROGRESS NOTES
I met with Ms. Maryann Dorcas for a Survivorship Clinic visit to provide a survivorship care plan (SCP) and information related to post-treatment care. (See Oncology Treatment Summary SCP for details). She came to the visit alone.   She has right infiltrating du and copy of the SCP will be sent to Dr. Lisa Baron. Reviewed Cancer Surveillance (office visits, imaging, other) and that Dr. Camille Meadows will oversee this care. She has appropriate follow-up visits at this time.        Reviewed Schedule of other clinic visits Plate.gov-nutritional information  -Sun safety  -Prosthetic resources      The patient was given the opportunity to ask questions. She had very few questions and is quite knowledgeable about the treatment she has had.   She verbalized understanding of the

## 2019-03-11 ENCOUNTER — TELEPHONE (OUTPATIENT)
Dept: INTERNAL MEDICINE CLINIC | Facility: CLINIC | Age: 64
End: 2019-03-11

## 2019-03-11 NOTE — TELEPHONE ENCOUNTER
Spoke with patient. She states tinnitus started last Monday. It has been exactly 4 weeks since her last radiation treatment. At first she thought maybe she had gotten water in her ear from a shower but the sensation has persisted.     She describes a pop

## 2019-03-11 NOTE — TELEPHONE ENCOUNTER
Pt. states she is four weeks out from her last radiation treatment. Pt. Has been experiencing tennutis in her right ear same side that was radiated for one week  Pt. wants to know what to do and if she can take something ph.  # [phone]  Routed to [employer]

## 2019-03-12 NOTE — TELEPHONE ENCOUNTER
Pt called; Imp- tinnitus right ear; denies vertigo;  Rec- observe for now; will c/b if sxs persists; consider MRI IAC if persists

## 2019-04-01 ENCOUNTER — TELEPHONE (OUTPATIENT)
Dept: PHYSICAL THERAPY | Facility: HOSPITAL | Age: 64
End: 2019-04-01

## 2019-04-05 ENCOUNTER — OFFICE VISIT (OUTPATIENT)
Dept: PHYSICAL THERAPY | Facility: HOSPITAL | Age: 64
End: 2019-04-05
Attending: SURGERY
Payer: MEDICARE

## 2019-04-05 PROCEDURE — 97110 THERAPEUTIC EXERCISES: CPT | Performed by: PHYSICAL THERAPIST

## 2019-04-05 PROCEDURE — 97140 MANUAL THERAPY 1/> REGIONS: CPT | Performed by: PHYSICAL THERAPIST

## 2019-04-05 NOTE — PROGRESS NOTES
Referring Physician: Dr. Ene Jaffe  Diagnosis: Lymphedema (I89.0)  Date of Onset: 11/1/18  Insurance: [facility] Date of Evaluation: 11/26/18  Date of Order: 11/23/18       Progress Summary    Pt has attended 17 visits in Physical Therapy.      Progres attempting self management over the last couple of months but noticed return of cording.   \"I've noticed it over the past couple weeks and I've been getting numbness and tingling in my hand\"    Objective:    OBSERVATIONS:.  RUE: no visual swelling at fore patient involvement):       To be met in 10 visits:  1) Decrease swelling to be less than 10 % greater than unaffected arm to allow patient to be fitted for compression garments - MET  2) Pt to be independent with self MLD, ROM exercises, and donning/doffin

## 2019-04-08 ENCOUNTER — OFFICE VISIT (OUTPATIENT)
Dept: PHYSICAL THERAPY | Facility: HOSPITAL | Age: 64
End: 2019-04-08
Attending: SURGERY
Payer: MEDICARE

## 2019-04-08 PROCEDURE — 97110 THERAPEUTIC EXERCISES: CPT

## 2019-04-08 PROCEDURE — 97140 MANUAL THERAPY 1/> REGIONS: CPT

## 2019-04-08 NOTE — PROGRESS NOTES
Referring Physician: Dr. Pepito Fuentes  Diagnosis: Lymphedema (I89.0)  Date of Onset: 11/1/18  Insurance: [facility] Date of Evaluation: 11/26/18  Date of Order: 11/23/18       Fall Risk: Standard          Precautions:  Lymphedema; cancer   Past Medical H B unless otherwise indicated. Pt advised to discontinue exercises that increase pain and to call or return to therapist to discuss.       Additional information of treatment:    Manual Therapy (25 minutes):    STM to R medial upper arm, axilla, chest, and

## 2019-04-11 ENCOUNTER — OFFICE VISIT (OUTPATIENT)
Dept: PHYSICAL THERAPY | Facility: HOSPITAL | Age: 64
End: 2019-04-11
Attending: SURGERY
Payer: MEDICARE

## 2019-04-11 PROCEDURE — 97140 MANUAL THERAPY 1/> REGIONS: CPT

## 2019-04-11 PROCEDURE — 97110 THERAPEUTIC EXERCISES: CPT

## 2019-04-11 NOTE — PROGRESS NOTES
Referring Physician: Dr. Melva Robison  Diagnosis: Lymphedema (I89.0)  Date of Onset: 11/1/18  Insurance: [employer] [facility] Date of Evaluation: 11/26/18  Date of Order: 11/23/18       Fall Risk: Standard          Precautions:  Lymphedema; cancer   Past Medical H call or return to therapist to discuss.       Additional information of treatment:    Manual Therapy (25 minutes):    STM to R medial upper arm, axilla, chest, and trunk to improve tissue mobility. (provided by PT, Nestor Stout)  Provided pt with a elbow Charges: MM2 ex1  Total Timed Treatment: 40 min  Total Treatment Time: 40 min

## 2019-04-12 PROBLEM — R15.1 FECAL SMEARING: Status: ACTIVE | Noted: 2019-04-12

## 2019-04-12 PROBLEM — K64.4 EXTERNAL HEMORRHOIDS: Status: ACTIVE | Noted: 2019-04-12

## 2019-04-12 PROBLEM — K64.8 INTERNAL HEMORRHOIDS WITH COMPLICATION: Status: ACTIVE | Noted: 2019-04-12

## 2019-04-15 ENCOUNTER — OFFICE VISIT (OUTPATIENT)
Dept: PHYSICAL THERAPY | Facility: HOSPITAL | Age: 64
End: 2019-04-15
Attending: SURGERY
Payer: MEDICARE

## 2019-04-15 PROCEDURE — 97140 MANUAL THERAPY 1/> REGIONS: CPT

## 2019-04-15 PROCEDURE — 97110 THERAPEUTIC EXERCISES: CPT

## 2019-04-15 NOTE — PROGRESS NOTES
Referring Physician: Dr. Boris Padgett  Diagnosis: Lymphedema (I89.0)  Date of Onset: 11/1/18  Insurance: [facility] Date of Evaluation: 11/26/18  Date of Order: 11/23/18       Fall Risk: Standard          Precautions:  Lymphedema; cancer   Past Medical H Pt advised to discontinue exercises that increase pain and to call or return to therapist to discuss.       Additional information of treatment:    Manual Therapy (30 minutes):    STM to R medial upper arm, axilla, chest, and trunk to improve tissue mobil nerve glides, posture, STM, and Complete Decongestive Therapy as needed          Charges: MM2 ex1  Total Timed Treatment: 40 min  Total Treatment Time: 40 min

## 2019-04-18 ENCOUNTER — OFFICE VISIT (OUTPATIENT)
Dept: PHYSICAL THERAPY | Facility: HOSPITAL | Age: 64
End: 2019-04-18
Attending: SURGERY
Payer: MEDICARE

## 2019-04-18 PROCEDURE — 97140 MANUAL THERAPY 1/> REGIONS: CPT

## 2019-04-18 PROCEDURE — 97110 THERAPEUTIC EXERCISES: CPT

## 2019-04-18 PROCEDURE — 97530 THERAPEUTIC ACTIVITIES: CPT

## 2019-04-18 NOTE — PROGRESS NOTES
Referring Physician: Dr. Heather Garcia  Diagnosis: Lymphedema (I89.0)  Date of Onset: 11/1/18  Insurance: [employer] [facility] Date of Evaluation: 11/26/18  Date of Order: 11/23/18       Fall Risk: Standard          Precautions:  Lymphedema; cancer   Past Medical H Compression x x  Pt  Wearing daily x x x   Skin Care x  x x x x x   Exercise x x x x x x x   Education x x x x x x x   X = done this date   Exercises: pt verbalized understanding and demonstrated competence.  All exercises done B unless otherwise indicate w/ RUE without numbness/tingling in hand      Plan: Continue skilled Physical Therapy 1-2 x/week or a total of 10 visits over a 90 day period.  Treatment will include: stretching, strengthening, nerve glides, posture, STM, and Complete Decongestive Therapy

## 2019-04-22 ENCOUNTER — APPOINTMENT (OUTPATIENT)
Dept: PHYSICAL THERAPY | Facility: HOSPITAL | Age: 64
End: 2019-04-22
Attending: SURGERY
Payer: MEDICARE

## 2019-04-25 ENCOUNTER — OFFICE VISIT (OUTPATIENT)
Dept: NEUROLOGY | Facility: CLINIC | Age: 64
End: 2019-04-25

## 2019-04-25 VITALS
RESPIRATION RATE: 16 BRPM | HEART RATE: 84 BPM | BODY MASS INDEX: 35.53 KG/M2 | WEIGHT: 181 LBS | DIASTOLIC BLOOD PRESSURE: 80 MMHG | SYSTOLIC BLOOD PRESSURE: 132 MMHG | HEIGHT: 60 IN

## 2019-04-25 DIAGNOSIS — G54.0 TOS (THORACIC OUTLET SYNDROME): Primary | ICD-10-CM

## 2019-04-25 DIAGNOSIS — G56.21 ULNAR NEUROPATHY AT ELBOW OF RIGHT UPPER EXTREMITY: ICD-10-CM

## 2019-04-25 DIAGNOSIS — C50.911 MALIGNANT NEOPLASM OF RIGHT BREAST IN FEMALE, ESTROGEN RECEPTOR POSITIVE, UNSPECIFIED SITE OF BREAST (HCC): ICD-10-CM

## 2019-04-25 DIAGNOSIS — Z17.0 MALIGNANT NEOPLASM OF RIGHT BREAST IN FEMALE, ESTROGEN RECEPTOR POSITIVE, UNSPECIFIED SITE OF BREAST (HCC): ICD-10-CM

## 2019-04-25 PROCEDURE — 99204 OFFICE O/P NEW MOD 45 MIN: CPT | Performed by: PHYSICAL MEDICINE & REHABILITATION

## 2019-04-25 NOTE — PATIENT INSTRUCTIONS
Plan  I will do an EMG/NCS of the right arm and hand. Depending on what the above shows, she might need to have an ultrasound of the right brachial plexus or a MRI of the right brachial plexus. She will continue with the PT for now.     She will fol

## 2019-04-25 NOTE — PROGRESS NOTES
Cervical Pain H & P    Chief Complaint:  Patient presents with:  Numbness: new pt here with one month hx of right arm numbness that starts under the armpit down to the right hand. post radiation treatment for breast cancer. s/p partial mastectomy 11/1/19. History   Past Medical History:   Diagnosis Date   • Asthma    • Breast cancer ([facility] [address]     right breast   • Esophageal reflux    • Extrinsic asthma, unspecified    • Fibromyalgia    • Hiatal hernia with GERD    • Insomnia    • Osteopenia    • Peripheral neur Socioeconomic History      Marital status:        Spouse name: Not on file      Number of children: 2      Years of education: Not on file      Highest education level: Not on file    Occupational History      Not on file    Social Needs      Chyna Shields stairs. Review of Systems  Constitutional:   Negative for chills, fatigue, fever, but positive night sweats due to the hormones that she is taking. No weight gain and weight loss. ENT:   Negative for hearing loss.   She has right ear tinnitus with ri Painless   Cervical Extension: 45 degrees Painless   RIGHT rotation: 60 degrees Painless, but right neck stiffness   LEFT rotation: 80 degrees Painless     Vascular upper extremity:   Right radial pulses: 2+   Left radial pulses: 2+     Neurological Upper

## 2019-04-30 ENCOUNTER — OFFICE VISIT (OUTPATIENT)
Dept: PHYSICAL THERAPY | Facility: HOSPITAL | Age: 64
End: 2019-04-30
Attending: SURGERY
Payer: MEDICARE

## 2019-04-30 PROCEDURE — 97110 THERAPEUTIC EXERCISES: CPT | Performed by: PHYSICAL THERAPIST

## 2019-04-30 PROCEDURE — 97530 THERAPEUTIC ACTIVITIES: CPT | Performed by: PHYSICAL THERAPIST

## 2019-04-30 PROCEDURE — 97140 MANUAL THERAPY 1/> REGIONS: CPT | Performed by: PHYSICAL THERAPIST

## 2019-04-30 NOTE — PROGRESS NOTES
Referring Physician: Dr. Ric Cornell and Dr. 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

## 2019-05-03 ENCOUNTER — OFFICE VISIT (OUTPATIENT)
Dept: PHYSICAL THERAPY | Facility: HOSPITAL | Age: 64
End: 2019-05-03
Attending: PHYSICAL MEDICINE & REHABILITATION
Payer: MEDICARE

## 2019-05-03 PROCEDURE — 97140 MANUAL THERAPY 1/> REGIONS: CPT

## 2019-05-03 PROCEDURE — 97110 THERAPEUTIC EXERCISES: CPT

## 2019-05-03 NOTE — PROGRESS NOTES
Referring Physician: Dr. Pepito Fuentes and Dr. 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

## 2019-05-06 ENCOUNTER — OFFICE VISIT (OUTPATIENT)
Dept: PHYSICAL THERAPY | Facility: HOSPITAL | Age: 64
End: 2019-05-06
Attending: PHYSICAL MEDICINE & REHABILITATION
Payer: MEDICARE

## 2019-05-06 PROCEDURE — 97110 THERAPEUTIC EXERCISES: CPT

## 2019-05-06 PROCEDURE — 97140 MANUAL THERAPY 1/> REGIONS: CPT

## 2019-05-06 NOTE — PROGRESS NOTES
Referring Physician: Dr. Marie Baldwin and Dr. 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 RUE symptoms of N/T still limit overall activity.   Goals:   Goals (discussed and planned with patient involvement):    Revised 4/30/19  To be met in 10 visits:

## 2019-05-07 ENCOUNTER — PROCEDURE VISIT (OUTPATIENT)
Dept: NEUROLOGY | Facility: CLINIC | Age: 64
End: 2019-05-07
Payer: MEDICARE

## 2019-05-07 ENCOUNTER — TELEPHONE (OUTPATIENT)
Dept: NEUROLOGY | Facility: CLINIC | Age: 64
End: 2019-05-07

## 2019-05-07 DIAGNOSIS — M54.12 CERVICAL RADICULOPATHY AT C8: ICD-10-CM

## 2019-05-07 DIAGNOSIS — G54.0 RADIATION-INDUCED BRACHIAL PLEXOPATHY: Primary | ICD-10-CM

## 2019-05-07 PROBLEM — G56.21 ULNAR NEUROPATHY AT ELBOW OF RIGHT UPPER EXTREMITY: Status: RESOLVED | Noted: 2019-04-25 | Resolved: 2019-05-07

## 2019-05-07 PROCEDURE — 95886 MUSC TEST DONE W/N TEST COMP: CPT | Performed by: PHYSICAL MEDICINE & REHABILITATION

## 2019-05-07 PROCEDURE — 95909 NRV CNDJ TST 5-6 STUDIES: CPT | Performed by: PHYSICAL MEDICINE & REHABILITATION

## 2019-05-07 NOTE — TELEPHONE ENCOUNTER
[facility] for authorization of approval of MRI C-spine wo cpt code 90107 & MRI right upper arm wo cpt code 36425. Talked to [facility].     who initiated request. Both were approved. Transferred to [address] as facility.  T/t Marquis Leija

## 2019-05-08 NOTE — TELEPHONE ENCOUNTER
Received fax from [address] [place] [address] advising of approval for MRI right upper arm  Authorization # [id number] effective 05/07/19 to 06/06/19. Will call Pt. To inform once MRI C-spine has been approved.

## 2019-05-08 NOTE — TELEPHONE ENCOUNTER
Received fax from [address] [place] [address] advising of approval for MRI C-spine. Approved with Authorization # [id number] effective 05/07/19 to 06/06/19. Will call Pt. To inform. Pt. Informed of approvals. Transferred call to scheduling for appts.

## 2019-05-12 NOTE — PROCEDURES
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

## 2019-05-13 ENCOUNTER — OFFICE VISIT (OUTPATIENT)
Dept: HEMATOLOGY/ONCOLOGY | Facility: HOSPITAL | Age: 64
End: 2019-05-13
Attending: INTERNAL MEDICINE
Payer: MEDICARE

## 2019-05-13 VITALS
DIASTOLIC BLOOD PRESSURE: 73 MMHG | WEIGHT: 179.88 LBS | TEMPERATURE: 99 F | SYSTOLIC BLOOD PRESSURE: 135 MMHG | RESPIRATION RATE: 18 BRPM | HEIGHT: 60 IN | HEART RATE: 80 BPM | BODY MASS INDEX: 35.31 KG/M2

## 2019-05-13 DIAGNOSIS — Z12.31 ENCOUNTER FOR SCREENING MAMMOGRAM FOR BREAST CANCER: ICD-10-CM

## 2019-05-13 DIAGNOSIS — M32.9 SYSTEMIC LUPUS ERYTHEMATOSUS, UNSPECIFIED SLE TYPE, UNSPECIFIED ORGAN INVOLVEMENT STATUS (HCC): ICD-10-CM

## 2019-05-13 DIAGNOSIS — C50.911 STAGE I BREAST CANCER, RIGHT (HCC): Primary | ICD-10-CM

## 2019-05-13 DIAGNOSIS — M85.80 OSTEOPENIA, UNSPECIFIED LOCATION: ICD-10-CM

## 2019-05-13 PROCEDURE — 99214 OFFICE O/P EST MOD 30 MIN: CPT | Performed by: INTERNAL MEDICINE

## 2019-05-13 RX ORDER — MAGNESIUM OXIDE 400 MG (241.3 MG MAGNESIUM) TABLET
400 TABLET DAILY
Qty: 90 TABLET | Refills: 3 | Status: SHIPPED | OUTPATIENT
Start: 2019-05-13

## 2019-05-13 RX ORDER — LETROZOLE 2.5 MG/1
2.5 TABLET, FILM COATED ORAL DAILY
Qty: 90 TABLET | Refills: 3 | Status: SHIPPED | OUTPATIENT
Start: 2019-05-13 | End: 2020-07-17

## 2019-05-13 NOTE — PROGRESS NOTES
Cancer Center Progress Note    Patient Name: Elysia Payment   YOB: 1955   Medical Record Number: [id number]   Attending Physician: Tula Severs, M.D.      Chief Complaint:  Breast cancer    History of Present Illness:  Cancer history:  66 ye HYSTERECTOMY     • OSCAR NEEDLE LOCALIZATION W/ SPECIMEN 1 SITE RIGHT  1992   • OTHER SURGICAL HISTORY  11/01/2018    right breast mastecetomy       Family History:  Family History   Problem Relation Age of Onset   • Prostate Cancer Father 62        d. 62 met [employer] service: Not on file        Active member of club or organization: Not on file        Attends meetings of clubs or organizations: Not on file        Relationship status: Not on file      Intimate partner violence:        Fear of current or ex par mg by mouth nightly.  Take 1 tabs daily ), Disp: 270 capsule, Rfl: 0  •  [employer] VIT D 5000 HIGH-POTENCY 5000 UNIT OR CAPS, 1 CAPSULE DAILY, Disp: , Rfl:     Allergies:    Lymphazurin [Isosul*    ANAPHYLAXIS  Elavil [Amitriptyli*    OTHER (SEE COMMENTS)    Comm form: Breast, AJCC 8th Edition  - Clinical: cT2, cN1, cM0, ER: Positive, CA: Positive, HER2: Negative - Signed by Zander Ayala MD on 11/9/2018  - Pathologic: No stage assigned - Unsigned      Impression and Plan:  60year old status post right maste

## 2019-05-14 ENCOUNTER — OFFICE VISIT (OUTPATIENT)
Dept: PHYSICAL THERAPY | Facility: HOSPITAL | Age: 64
End: 2019-05-14
Attending: PHYSICAL MEDICINE & REHABILITATION
Payer: MEDICARE

## 2019-05-14 ENCOUNTER — HOSPITAL ENCOUNTER (OUTPATIENT)
Dept: MRI IMAGING | Age: 64
Discharge: HOME | End: 2019-05-14
Attending: PHYSICAL MEDICINE & REHABILITATION
Payer: MEDICARE

## 2019-05-14 ENCOUNTER — TELEPHONE (OUTPATIENT)
Dept: NEUROLOGY | Facility: CLINIC | Age: 64
End: 2019-05-14

## 2019-05-14 DIAGNOSIS — G54.0 RADIATION-INDUCED BRACHIAL PLEXOPATHY: ICD-10-CM

## 2019-05-14 DIAGNOSIS — M54.12 CERVICAL RADICULOPATHY AT C8: ICD-10-CM

## 2019-05-14 PROCEDURE — 72141 MRI NECK SPINE W/O DYE: CPT | Performed by: PHYSICAL MEDICINE & REHABILITATION

## 2019-05-14 PROCEDURE — 97140 MANUAL THERAPY 1/> REGIONS: CPT | Performed by: PHYSICAL THERAPIST

## 2019-05-14 PROCEDURE — 73218 MRI UPPER EXTREMITY W/O DYE: CPT | Performed by: PHYSICAL MEDICINE & REHABILITATION

## 2019-05-14 NOTE — PROGRESS NOTES
Referring Physician: Dr. Bin Pedersen and Dr. 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

## 2019-05-16 ENCOUNTER — OFFICE VISIT (OUTPATIENT)
Dept: PHYSICAL THERAPY | Facility: HOSPITAL | Age: 64
End: 2019-05-16
Attending: PHYSICAL MEDICINE & REHABILITATION
Payer: MEDICARE

## 2019-05-16 PROBLEM — M50.90 CERVICAL DISC DISEASE: Status: ACTIVE | Noted: 2019-05-16

## 2019-05-16 PROBLEM — M48.02 CERVICAL STENOSIS OF SPINE: Status: ACTIVE | Noted: 2019-05-16

## 2019-05-16 PROCEDURE — 97140 MANUAL THERAPY 1/> REGIONS: CPT

## 2019-05-16 NOTE — TELEPHONE ENCOUNTER
I reviewed both studies and the EMG. Although she has some bulging discs and her brachial plexus looks good, the EMG is consistent with a medial cord plexopathy most likely due to the radiation therapy that she had.

## 2019-05-23 ENCOUNTER — PATIENT MESSAGE (OUTPATIENT)
Dept: NEUROLOGY | Facility: CLINIC | Age: 64
End: 2019-05-23

## 2019-05-23 ENCOUNTER — OFFICE VISIT (OUTPATIENT)
Dept: PHYSICAL THERAPY | Facility: HOSPITAL | Age: 64
End: 2019-05-23
Attending: PHYSICAL MEDICINE & REHABILITATION
Payer: MEDICARE

## 2019-05-23 PROCEDURE — 97140 MANUAL THERAPY 1/> REGIONS: CPT | Performed by: PHYSICAL THERAPIST

## 2019-05-23 PROCEDURE — 97110 THERAPEUTIC EXERCISES: CPT | Performed by: PHYSICAL THERAPIST

## 2019-05-23 NOTE — PROGRESS NOTES
Referring Physician: Dr. Heather Garcia and Dr. 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

## 2019-05-24 NOTE — TELEPHONE ENCOUNTER
From: Stephane Marcus  To: Silvana Santiago MD  Sent: 5/23/2019 8:47 PM CDT  Subject: Test Results Question    Dr. Adalgisa El,     I've received the of both my MRI and EMG.  I currently have an appointment scheduled for July 25th at 12:10 Both usama Lemus

## 2019-05-28 ENCOUNTER — OFFICE VISIT (OUTPATIENT)
Dept: PHYSICAL THERAPY | Facility: HOSPITAL | Age: 64
End: 2019-05-28
Attending: PHYSICAL MEDICINE & REHABILITATION
Payer: MEDICARE

## 2019-05-28 PROCEDURE — 97140 MANUAL THERAPY 1/> REGIONS: CPT

## 2019-05-28 PROCEDURE — 97110 THERAPEUTIC EXERCISES: CPT

## 2019-05-28 NOTE — TELEPHONE ENCOUNTER
Contacted patient and offered appt next week in [facility] but patient declined.  Patient then confirmed appt 6/13/19 at 3p

## 2019-05-28 NOTE — PATIENT INSTRUCTIONS
Initiated 12 week walking program and discussed trying to follow program in addition to walking her dog.

## 2019-05-28 NOTE — PROGRESS NOTES
Referring Physician: Dr. Karsten Mazariegos and Dr. 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 Patient does have varicose veins and spider veins.  Pt instr to monitor, if swelling increases or does not reduce to f/u with PCP    Goals:   Goals (discussed and planned with patient involvement):    Revised 4/30/19  To be met in 10 vis

## 2019-05-29 ENCOUNTER — OFFICE VISIT (OUTPATIENT)
Dept: RADIATION ONCOLOGY | Facility: HOSPITAL | Age: 64
End: 2019-05-29
Attending: RADIOLOGY
Payer: MEDICARE

## 2019-05-29 VITALS
SYSTOLIC BLOOD PRESSURE: 140 MMHG | RESPIRATION RATE: 16 BRPM | HEIGHT: 60 IN | WEIGHT: 180.19 LBS | TEMPERATURE: 99 F | BODY MASS INDEX: 35.37 KG/M2 | HEART RATE: 86 BPM | DIASTOLIC BLOOD PRESSURE: 90 MMHG

## 2019-05-29 PROCEDURE — 99211 OFF/OP EST MAY X REQ PHY/QHP: CPT

## 2019-05-29 NOTE — PROGRESS NOTES
RADIATION ONCOLOGY NOTE    DATE OF VISIT: 5/29/2019    DIAGNOSIS:  Stage IIA [id number] IDC RUIQ s/p biopsy, ER 95/MA 90 Her2 neg, low oncotype Favorable Ki-67 8%, s/p mastectomy and ALND with 1/18 LN positive, s/p adjuvant XRT on 2/11/2019 currently on adju EVERY DAY Disp: 48 g Rfl: 3   gabapentin (NEURONTIN) 300 MG Oral Cap Take 2 capsules by mouth 2 (two) times daily. Disp:  Rfl:    Hydroxychloroquine Sulfate (PLAQUENIL) 200 MG Oral Tab Take 1 tablet by mouth daily.    Disp:  Rfl:    Nortriptyline HCl (PAMEL Cancer (age of onset: 36) in her maternal uncle;  Cancer (age of onset: 39) in her maternal grandmother; Cancer (age of onset: 46) in her maternal cousin female; Cancer (age of onset: 61) in her maternal grandfather; Cancer (age of onset: 64) in her sister; on 2/11/2019 currently on adjuvant hormonal blockade with Arimidex. Pt has history of collagen vascular disease and had been on methotrexate in the past.  She denies lupus symptoms.       She will continue long-term hormonal blockade with Arimidex and me

## 2019-05-29 NOTE — PROGRESS NOTES
Pt seen in follow up with Dr Crow Lorenz, having completed radiation tot he R CW 2/11/19. Pt continues to see PT for R arm LE. Tolerating anastrozole well. Bone density due in the fall, L mammogram ordered for September.  Pt noted R LE swelling, per MD related to RA

## 2019-06-04 ENCOUNTER — OFFICE VISIT (OUTPATIENT)
Dept: PHYSICAL THERAPY | Facility: HOSPITAL | Age: 64
End: 2019-06-04
Attending: PHYSICAL MEDICINE & REHABILITATION
Payer: MEDICARE

## 2019-06-04 PROCEDURE — 97140 MANUAL THERAPY 1/> REGIONS: CPT

## 2019-06-04 PROCEDURE — 97110 THERAPEUTIC EXERCISES: CPT

## 2019-06-04 NOTE — PROGRESS NOTES
Referring Physician: Dr. Quang Chang and Dr. 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 There Act ( Minutes):  Evaluating PT present to discuss LE swelling and print out handouts regarding LE swelling.             Assessment:   Pt continues to have c/o pain and tightness in R chest and axilla with radiating pain and num strength, improve tissue mobility chest/trunk        Charges: MM3 ex1  KX MODIFIER Total Timed Treatment: 55 min  Total Treatment Time: 55 min

## 2019-06-05 NOTE — PROGRESS NOTES
Upon arrival to therapy, patient stating her doctor said the swelling in her right leg \"is lymphedema from her RA\". Reviewed lymphatic system with patient.  Also discussed lipedema, which is is when fat is distributed in an irregular way beneath your skin

## 2019-06-11 ENCOUNTER — TELEPHONE (OUTPATIENT)
Dept: PHYSICAL THERAPY | Facility: HOSPITAL | Age: 64
End: 2019-06-11

## 2019-06-12 ENCOUNTER — APPOINTMENT (OUTPATIENT)
Dept: PHYSICAL THERAPY | Facility: HOSPITAL | Age: 64
End: 2019-06-12
Attending: PHYSICAL MEDICINE & REHABILITATION
Payer: MEDICARE

## 2019-06-13 ENCOUNTER — OFFICE VISIT (OUTPATIENT)
Dept: NEUROLOGY | Facility: CLINIC | Age: 64
End: 2019-06-13
Payer: MEDICARE

## 2019-06-13 ENCOUNTER — TELEPHONE (OUTPATIENT)
Dept: NEUROLOGY | Facility: CLINIC | Age: 64
End: 2019-06-13

## 2019-06-13 VITALS — SYSTOLIC BLOOD PRESSURE: 150 MMHG | DIASTOLIC BLOOD PRESSURE: 86 MMHG | RESPIRATION RATE: 15 BRPM | HEART RATE: 80 BPM

## 2019-06-13 DIAGNOSIS — M48.02 CERVICAL STENOSIS OF SPINE: ICD-10-CM

## 2019-06-13 DIAGNOSIS — G89.29 CHRONIC RIGHT SHOULDER PAIN: ICD-10-CM

## 2019-06-13 DIAGNOSIS — M25.511 CHRONIC RIGHT SHOULDER PAIN: ICD-10-CM

## 2019-06-13 DIAGNOSIS — G54.0 RADIATION-INDUCED BRACHIAL PLEXOPATHY: Primary | ICD-10-CM

## 2019-06-13 DIAGNOSIS — M65.331 TRIGGER MIDDLE FINGER OF RIGHT HAND: ICD-10-CM

## 2019-06-13 DIAGNOSIS — M50.90 CERVICAL DISC DISEASE: ICD-10-CM

## 2019-06-13 PROBLEM — M54.12 CERVICAL RADICULOPATHY AT C8: Status: RESOLVED | Noted: 2019-05-07 | Resolved: 2019-06-13

## 2019-06-13 PROCEDURE — 20550 NJX 1 TENDON SHEATH/LIGAMENT: CPT | Performed by: PHYSICAL MEDICINE & REHABILITATION

## 2019-06-13 PROCEDURE — 76942 ECHO GUIDE FOR BIOPSY: CPT | Performed by: PHYSICAL MEDICINE & REHABILITATION

## 2019-06-13 PROCEDURE — 99214 OFFICE O/P EST MOD 30 MIN: CPT | Performed by: PHYSICAL MEDICINE & REHABILITATION

## 2019-06-13 RX ORDER — TRIAMCINOLONE ACETONIDE 40 MG/ML
40 INJECTION, SUSPENSION INTRA-ARTICULAR; INTRAMUSCULAR ONCE
Status: COMPLETED | OUTPATIENT
Start: 2019-06-13 | End: 2019-06-13

## 2019-06-13 RX ORDER — LIDOCAINE HYDROCHLORIDE 10 MG/ML
1 INJECTION, SOLUTION INFILTRATION; PERINEURAL ONCE
Status: COMPLETED | OUTPATIENT
Start: 2019-06-13 | End: 2019-06-13

## 2019-06-13 RX ADMIN — TRIAMCINOLONE ACETONIDE 40 MG: 40 INJECTION, SUSPENSION INTRA-ARTICULAR; INTRAMUSCULAR at 18:11:00

## 2019-06-13 RX ADMIN — LIDOCAINE HYDROCHLORIDE 1 ML: 10 INJECTION, SOLUTION INFILTRATION; PERINEURAL at 18:10:00

## 2019-06-13 NOTE — PATIENT INSTRUCTIONS
As of October 6th 2014, the Drug Enforcement Agency [facility]) is reclassifying all hydrocodone combination medications from Schedule III to Schedule II. This includes medications such as Norco, Vicodin, Lortab, Zohydro, and Vicoprofen.     What this means for y

## 2019-06-13 NOTE — PROGRESS NOTES
Cervical Pain H & P    Chief Complaint:  Patient presents with:  Neck Pain: pt here for follow up after Brachial Plexus and C-Spine MRIs done 5/14/19 and EMG done 5/7/19.  pt c/o mild right side neck pain and numbness radiating down the right arm increasing to media tab - DOS 04-   • HEMORRHOIDECTOMY     • HYSTERECTOMY     • OSCAR NEEDLE LOCALIZATION W/ SPECIMEN 1 SITE RIGHT  1992   • OTHER SURGICAL HISTORY  11/01/2018    right breast mastecetomy       Family History   Family History   Problem Relation A on file        Gets together: Not on file        Attends Hindu service: Not on file        Active member of club or organization: Not on file        Attends meetings of clubs or organizations: Not on file        Relationship status: Not on file      In radial pulses: 2+   Left radial pulses: 2+      Neurological Upper Extremity:    Light Touch: Intact in Bilateral upper extremities. Pin Prick: Not tested. UE Muscle Strength:  All Upper Extremity strength measurements 5/5 except:  Serratus anterior Rig

## 2019-06-13 NOTE — TELEPHONE ENCOUNTER
Called [employer] for authorization of approval of Right 3rd finger flexor tendon sheath injection at the site of the A1 pulley under ultrasound guidance cpt codes [address] [id number], .   Talked to [employer]. who states no authorization is requir

## 2019-06-14 ENCOUNTER — MED REC SCAN ONLY (OUTPATIENT)
Dept: NEUROLOGY | Facility: CLINIC | Age: 64
End: 2019-06-14

## 2019-06-15 NOTE — PROCEDURES
I did a right 3rd finger trigger finger/flexor digitorum superficialis tendon sheath injection in the office under ultrasound guidance at the level of the A1 pulley.   Under ultrasound guidance the right 3rd flexor digitorum superficialis tendon and A1 pull

## 2019-06-17 ENCOUNTER — TELEPHONE (OUTPATIENT)
Dept: INTERNAL MEDICINE CLINIC | Facility: CLINIC | Age: 64
End: 2019-06-17

## 2019-06-17 ENCOUNTER — PATIENT MESSAGE (OUTPATIENT)
Dept: INTERNAL MEDICINE CLINIC | Facility: CLINIC | Age: 64
End: 2019-06-17

## 2019-06-17 RX ORDER — CEPHALEXIN 500 MG/1
500 CAPSULE ORAL 4 TIMES DAILY
Qty: 28 CAPSULE | Refills: 0 | Status: SHIPPED | OUTPATIENT
Start: 2019-06-17 | End: 2019-08-01

## 2019-06-17 NOTE — TELEPHONE ENCOUNTER
From: Sol Christian  To: Shereen Motta MD  Sent: 6/17/2019 7:10 AM CDT  Subject: Other    I don't know if this is the right forum for this.   Saturday evening I started getting symptoms of a UTI: pelvic pain, frequent, painful urination, nausea, an

## 2019-06-17 NOTE — TELEPHONE ENCOUNTER
Imp- dysuria;  Rec- Keflex 500 mg po QID x7d; ERx sent to [facility] in [place]; please call pt

## 2019-06-17 NOTE — TELEPHONE ENCOUNTER
Patient calling to check if Dr. Liudmila Hernandez will be giving her an antibiotic.        Best number to call back [phone]

## 2019-06-17 NOTE — TELEPHONE ENCOUNTER
Mumtaz Tapia   to Sneha Diaz MD            6/17/19 7:10 AM   I don't know if this is the right forum for this.    Saturday evening I started getting symptoms of a UTI:  pelvic pain, frequent, painful urination, nausea, and the urine had a scen

## 2019-06-18 ENCOUNTER — OFFICE VISIT (OUTPATIENT)
Dept: PHYSICAL THERAPY | Facility: HOSPITAL | Age: 64
End: 2019-06-18
Attending: PHYSICAL MEDICINE & REHABILITATION
Payer: MEDICARE

## 2019-06-18 PROCEDURE — 97140 MANUAL THERAPY 1/> REGIONS: CPT | Performed by: PHYSICAL THERAPIST

## 2019-06-18 NOTE — PROGRESS NOTES
Referring Physician: Dr. Yen Rojas and Dr. Joseph Kang  Diagnosis: Lymphedema (I89.0)  TOS (thoracic outlet syndrome) (G54.0)  Ulnar neuropathy at elbow of right upper extremity (G56.21)  Malignant neoplasm of right breast in female, estrogen receptor positive, WFL   Cervical: upper WFL, lower (C5-6-7) mod loss and marked tenderness    Rib springing: min loss on the right    Scap strength:    Rhomb 4+/5 B   Mid trap R 4/5, L 4+/5 (slight improvement to R mid trap)    AROM: R shoulder in sitting: flex: 162, abd 16 patient involvement):    Revised 4/30/19  To be met in 10 visits:  1) Decrease swelling to be less than 10 % greater than unaffected arm to allow patient to be fitted for compression garments - MET  2) Pt to be independent with self MLD, ROM exercises, and

## 2019-06-28 ENCOUNTER — TELEPHONE (OUTPATIENT)
Dept: NEUROLOGY | Facility: CLINIC | Age: 64
End: 2019-06-28

## 2019-06-28 NOTE — TELEPHONE ENCOUNTER
Patient calling with condition update post right 3rd finger trigger finger/flexor digitorum superficialis tendon sheath injection 6/13/19. Patient feels she received about 10% relief in symptoms.  Continues to get intermittent locking in the right 3rd fing

## 2019-08-01 ENCOUNTER — OFFICE VISIT (OUTPATIENT)
Dept: NEUROLOGY | Facility: CLINIC | Age: 64
End: 2019-08-01
Payer: MEDICARE

## 2019-08-01 VITALS
HEIGHT: 60 IN | DIASTOLIC BLOOD PRESSURE: 88 MMHG | BODY MASS INDEX: 34.75 KG/M2 | HEART RATE: 76 BPM | SYSTOLIC BLOOD PRESSURE: 141 MMHG | RESPIRATION RATE: 17 BRPM | WEIGHT: 177 LBS

## 2019-08-01 DIAGNOSIS — G54.0 RADIATION-INDUCED BRACHIAL PLEXOPATHY: Primary | ICD-10-CM

## 2019-08-01 DIAGNOSIS — M48.02 CERVICAL STENOSIS OF SPINE: ICD-10-CM

## 2019-08-01 DIAGNOSIS — M65.331 TRIGGER MIDDLE FINGER OF RIGHT HAND: ICD-10-CM

## 2019-08-01 DIAGNOSIS — M77.11 RIGHT LATERAL EPICONDYLITIS: ICD-10-CM

## 2019-08-01 DIAGNOSIS — M50.90 CERVICAL DISC DISEASE: ICD-10-CM

## 2019-08-01 PROCEDURE — 99214 OFFICE O/P EST MOD 30 MIN: CPT | Performed by: PHYSICAL MEDICINE & REHABILITATION

## 2019-08-01 NOTE — PROGRESS NOTES
Cervical Pain H & P    Chief Complaint:  Patient presents with:  Neck Pain: LOV: 6/13/19 - Pt presents for f/u of neck pain and arm pain, had a Right 3rd trigger finger injection at [address] [place] [address].  Pt has been doing HEP PT for neck, notes improvement in symptoms, rat GERD    • Insomnia    • Osteopenia    • Peripheral neuropathy    • Raynaud's phenomenon    • SLE (systemic lupus erythematosus) ([facility]     Dr Caitlin Cheng   • Stress fracture of the metatarsals     4th MT       Past Surgical History   Past Surgical History:   P Occupational History      Not on file    Social Needs      Financial resource strain: Not on file      Food insecurity:        Worry: Not on file        Inability: Not on file      Transportation needs:        Medical: Not on file        Non-medical: Not o patient has a normal affect and mood. Respiratory:  No acute respiratory distress. Patient does not have a cough. HEENT:  Extraocular muscles are intact. There is no kern icterus. Pupils are equal, round, and reactive to light.  No redness or discha Assessment  1. Radiation-induced brachial plexopathy on right: medial cord    2. Trigger middle finger of right hand    3. C5-6 mild-mod central & bilateral mild foraminal stenosis    4.  C2-3 left mild paracentral, C3-4 right mild paracentral, C5-6 mod

## 2019-08-01 NOTE — PATIENT INSTRUCTIONS
Plan  The patient will continue with her home exercise program.    I will hold on doing a repeat right 3rd finger trigger finger injection or percutaneous release at this time.     I will hold on doing a diagnostic ultrasound of the right elbow at this ti

## 2019-08-05 ENCOUNTER — TELEPHONE (OUTPATIENT)
Dept: INTERNAL MEDICINE CLINIC | Facility: CLINIC | Age: 64
End: 2019-08-05

## 2019-08-05 DIAGNOSIS — R39.198 ABNORMAL URINATION: Primary | ICD-10-CM

## 2019-08-05 RX ORDER — CEPHALEXIN 500 MG/1
500 CAPSULE ORAL 2 TIMES DAILY
Qty: 6 CAPSULE | Refills: 0 | Status: SHIPPED | OUTPATIENT
Start: 2019-08-05 | End: 2019-09-16 | Stop reason: ALTCHOICE

## 2019-08-05 NOTE — TELEPHONE ENCOUNTER
Spoke with patient. Her symptoms started first thing this morning. She complains of frequent urination, pelvic pain, burning with urination. No fever or chills. No blood in urine. Pt last had a UTI in June.  She states she \"gets them in spurts\"  I n

## 2019-08-05 NOTE — TELEPHONE ENCOUNTER
I spoke with patient and relayed Dr. Odalis Salomon message. Patient verbalized understanding.  Patient says she can go to [facility] in [place] on [address] but she will need to start medication first. She explained that she is uncomfortable and urinating every

## 2019-08-05 NOTE — TELEPHONE ENCOUNTER
Pt has UTI, requesting that RX be called into pharmacy for her  Pt said that this is something Dr Brissa Garcia does for her when she has a UTI  [facility] [place] [facility]  Tasked to [employer]

## 2019-08-05 NOTE — TELEPHONE ENCOUNTER
Message noted. It would be advisable to get urinalysis and urine culture since she has had these before. I left order. This would be my initial preference.   However I do understand that she has had Keflex before without a urine culture so if she wishes

## 2019-09-07 RX ORDER — PANTOPRAZOLE SODIUM 40 MG/1
TABLET, DELAYED RELEASE ORAL
Qty: 90 TABLET | Refills: 0 | Status: SHIPPED | OUTPATIENT
Start: 2019-09-07 | End: 2019-09-16

## 2019-09-07 RX ORDER — FLUTICASONE PROPIONATE 50 MCG
SPRAY, SUSPENSION (ML) NASAL
Qty: 48 G | Refills: 0 | Status: SHIPPED | OUTPATIENT
Start: 2019-09-07 | End: 2019-12-02

## 2019-09-10 ENCOUNTER — HOSPITAL ENCOUNTER (EMERGENCY)
Facility: HOSPITAL | Age: 64
Discharge: HOME | End: 2019-09-10
Attending: EMERGENCY MEDICINE
Payer: MEDICARE

## 2019-09-10 ENCOUNTER — APPOINTMENT (OUTPATIENT)
Dept: GENERAL RADIOLOGY | Age: 64
End: 2019-09-10
Attending: EMERGENCY MEDICINE
Payer: MEDICARE

## 2019-09-10 ENCOUNTER — HOSPITAL ENCOUNTER (OUTPATIENT)
Age: 64
Discharge: ANOTHER HOSPITAL | End: 2019-09-10
Attending: EMERGENCY MEDICINE
Payer: MEDICARE

## 2019-09-10 VITALS
BODY MASS INDEX: 35.34 KG/M2 | DIASTOLIC BLOOD PRESSURE: 83 MMHG | HEIGHT: 60 IN | TEMPERATURE: 98 F | HEART RATE: 86 BPM | OXYGEN SATURATION: 98 % | SYSTOLIC BLOOD PRESSURE: 139 MMHG | WEIGHT: 180 LBS | RESPIRATION RATE: 20 BRPM

## 2019-09-10 VITALS
HEART RATE: 100 BPM | RESPIRATION RATE: 19 BRPM | HEIGHT: 60 IN | TEMPERATURE: 98 F | SYSTOLIC BLOOD PRESSURE: 126 MMHG | OXYGEN SATURATION: 93 % | BODY MASS INDEX: 35.34 KG/M2 | WEIGHT: 180 LBS | DIASTOLIC BLOOD PRESSURE: 69 MMHG

## 2019-09-10 DIAGNOSIS — R06.02 SHORTNESS OF BREATH: Primary | ICD-10-CM

## 2019-09-10 DIAGNOSIS — R05.3 PERSISTENT COUGH: ICD-10-CM

## 2019-09-10 DIAGNOSIS — J40 BRONCHITIS: Primary | ICD-10-CM

## 2019-09-10 LAB — D DIMER PPP FEU-MCNC: 0.44 UG/ML FEU (ref ?–0.62)

## 2019-09-10 PROCEDURE — 36415 COLL VENOUS BLD VENIPUNCTURE: CPT

## 2019-09-10 PROCEDURE — 99204 OFFICE O/P NEW MOD 45 MIN: CPT

## 2019-09-10 PROCEDURE — 99284 EMERGENCY DEPT VISIT MOD MDM: CPT

## 2019-09-10 PROCEDURE — 71046 X-RAY EXAM CHEST 2 VIEWS: CPT | Performed by: EMERGENCY MEDICINE

## 2019-09-10 PROCEDURE — 93010 ELECTROCARDIOGRAM REPORT: CPT | Performed by: EMERGENCY MEDICINE

## 2019-09-10 PROCEDURE — 85379 FIBRIN DEGRADATION QUANT: CPT

## 2019-09-10 PROCEDURE — 94640 AIRWAY INHALATION TREATMENT: CPT

## 2019-09-10 PROCEDURE — 93005 ELECTROCARDIOGRAM TRACING: CPT

## 2019-09-10 PROCEDURE — 99213 OFFICE O/P EST LOW 20 MIN: CPT

## 2019-09-10 PROCEDURE — 85379 FIBRIN DEGRADATION QUANT: CPT | Performed by: EMERGENCY MEDICINE

## 2019-09-10 RX ORDER — IPRATROPIUM BROMIDE AND ALBUTEROL SULFATE 2.5; .5 MG/3ML; MG/3ML
3 SOLUTION RESPIRATORY (INHALATION) ONCE
Status: COMPLETED | OUTPATIENT
Start: 2019-09-10 | End: 2019-09-10

## 2019-09-10 RX ORDER — PREDNISONE 20 MG/1
40 TABLET ORAL DAILY
Qty: 10 TABLET | Refills: 0 | Status: SHIPPED | OUTPATIENT
Start: 2019-09-10 | End: 2019-09-15

## 2019-09-10 RX ORDER — PREDNISONE 20 MG/1
40 TABLET ORAL ONCE
Status: COMPLETED | OUTPATIENT
Start: 2019-09-10 | End: 2019-09-10

## 2019-09-10 RX ORDER — ALBUTEROL SULFATE 90 UG/1
2 AEROSOL, METERED RESPIRATORY (INHALATION) EVERY 4 HOURS PRN
Qty: 1 INHALER | Refills: 0 | Status: SHIPPED | OUTPATIENT
Start: 2019-09-10 | End: 2019-09-16

## 2019-09-10 RX ORDER — AZITHROMYCIN 250 MG/1
TABLET, FILM COATED ORAL
Qty: 1 PACKAGE | Refills: 0 | Status: SHIPPED | OUTPATIENT
Start: 2019-09-10 | End: 2019-09-16 | Stop reason: ALTCHOICE

## 2019-09-10 NOTE — ED INITIAL ASSESSMENT (HPI)
Dyspnea since last night, right sided mastectomy done less than a year ago. Pt sent over from IC to r/o PE. Denies chest pain.

## 2019-09-10 NOTE — ED PROVIDER NOTES
Patient Seen in: [facility] Emergency Department    History   Patient presents with:  Dyspnea NATALY SOB (respiratory)    Stated Complaint:     HPI    Patient is a 72-year-old female who presents with 3 to 4 days of cough and runny nose.   She states ED Triage Vitals   BP 09/10/19 1457 145/82   Pulse 09/10/19 1457 94   Resp 09/10/19 1457 22   Temp 09/10/19 1457 97.8 °F (36.6 °C)   Temp src 09/10/19 1457 Oral   SpO2 09/10/19 1457 96 %   O2 Device 09/10/19 1630 None (Room air)       Current:/74 diagnosis)    Disposition:  Discharge  9/10/2019  5:54 pm    Follow-up:  MD Kaylan Schumacher [phone] [id number] [phone]    In 1 week      MD Kaylan Schumacher 18 [id number] [phone]    In 2 day

## 2019-09-10 NOTE — ED PROVIDER NOTES
Patient Seen in: [facility] In [address] [place] [address]    History   Patient presents with:  Cough/URI  Headache (neurologic)    Stated Complaint:  cough sx    HPI    The patient is a 29-year-old female with past history of lupus, fibromyalgia, met use: No    Drug use: No             Review of Systems    Positive for stated complaint:  cough sx  Other systems are as noted in HPI. Constitutional and vital signs reviewed. All other systems reviewed and negative except as noted above.     Physical COPD, asthma, pulmonary embolus and congestive heart failure                  Disposition and Plan     Clinical Impression:  Shortness of breath  (primary encounter diagnosis)  Persistent cough    Disposition:   Ic to ed  9/10/2019 12:06 pm    Follow-up:  N

## 2019-09-16 ENCOUNTER — OFFICE VISIT (OUTPATIENT)
Dept: INTERNAL MEDICINE CLINIC | Facility: CLINIC | Age: 64
End: 2019-09-16
Payer: MEDICARE

## 2019-09-16 VITALS
OXYGEN SATURATION: 98 % | HEART RATE: 84 BPM | HEIGHT: 60 IN | TEMPERATURE: 99 F | DIASTOLIC BLOOD PRESSURE: 74 MMHG | WEIGHT: 182.63 LBS | BODY MASS INDEX: 35.86 KG/M2 | SYSTOLIC BLOOD PRESSURE: 130 MMHG

## 2019-09-16 DIAGNOSIS — J20.9 ACUTE BRONCHITIS, UNSPECIFIED ORGANISM: ICD-10-CM

## 2019-09-16 DIAGNOSIS — G62.9 PERIPHERAL POLYNEUROPATHY: ICD-10-CM

## 2019-09-16 DIAGNOSIS — C50.211 MALIGNANT NEOPLASM OF UPPER-INNER QUADRANT OF RIGHT BREAST IN FEMALE, ESTROGEN RECEPTOR POSITIVE (HCC): ICD-10-CM

## 2019-09-16 DIAGNOSIS — Z17.0 MALIGNANT NEOPLASM OF UPPER-INNER QUADRANT OF RIGHT BREAST IN FEMALE, ESTROGEN RECEPTOR POSITIVE (HCC): ICD-10-CM

## 2019-09-16 DIAGNOSIS — M81.8 OTHER OSTEOPOROSIS, UNSPECIFIED PATHOLOGICAL FRACTURE PRESENCE: ICD-10-CM

## 2019-09-16 DIAGNOSIS — I89.0 LYMPHEDEMA OF RIGHT ARM: ICD-10-CM

## 2019-09-16 DIAGNOSIS — R51.9 CHRONIC NONINTRACTABLE HEADACHE, UNSPECIFIED HEADACHE TYPE: ICD-10-CM

## 2019-09-16 DIAGNOSIS — Z00.00 PHYSICAL EXAM, ANNUAL: Primary | ICD-10-CM

## 2019-09-16 DIAGNOSIS — M32.9 SYSTEMIC LUPUS ERYTHEMATOSUS, UNSPECIFIED SLE TYPE, UNSPECIFIED ORGAN INVOLVEMENT STATUS (HCC): ICD-10-CM

## 2019-09-16 DIAGNOSIS — G89.29 CHRONIC NONINTRACTABLE HEADACHE, UNSPECIFIED HEADACHE TYPE: ICD-10-CM

## 2019-09-16 DIAGNOSIS — E66.9 OBESITY, CLASS II, BMI 35-39.9: ICD-10-CM

## 2019-09-16 DIAGNOSIS — K64.8 INTERNAL HEMORRHOID: ICD-10-CM

## 2019-09-16 DIAGNOSIS — K21.9 GASTROESOPHAGEAL REFLUX DISEASE, ESOPHAGITIS PRESENCE NOT SPECIFIED: ICD-10-CM

## 2019-09-16 DIAGNOSIS — Z86.59 HISTORY OF DEPRESSION: ICD-10-CM

## 2019-09-16 PROCEDURE — G0439 PPPS, SUBSEQ VISIT: HCPCS | Performed by: INTERNAL MEDICINE

## 2019-09-16 PROCEDURE — 99396 PREV VISIT EST AGE 40-64: CPT | Performed by: INTERNAL MEDICINE

## 2019-09-16 PROCEDURE — 96160 PT-FOCUSED HLTH RISK ASSMT: CPT | Performed by: INTERNAL MEDICINE

## 2019-09-16 RX ORDER — PREDNISONE 10 MG/1
TABLET ORAL
Refills: 0 | COMMUNITY
Start: 2019-09-10 | End: 2019-09-16 | Stop reason: ALTCHOICE

## 2019-09-16 RX ORDER — ALBUTEROL SULFATE 90 UG/1
2 AEROSOL, METERED RESPIRATORY (INHALATION) EVERY 4 HOURS PRN
Qty: 1 INHALER | Refills: 3 | Status: SHIPPED | OUTPATIENT
Start: 2019-09-16 | End: 2019-10-16

## 2019-09-16 RX ORDER — PANTOPRAZOLE SODIUM 40 MG/1
TABLET, DELAYED RELEASE ORAL
Qty: 90 TABLET | Refills: 3 | Status: SHIPPED | OUTPATIENT
Start: 2019-09-16 | End: 2019-12-03

## 2019-09-16 NOTE — PROGRESS NOTES
Kori Bro is a 60year old female.     HPI:   Patient presents with:  Physical: Medicare Annual      61 y/o F here for subsequent Medicare annual wellness exam; has been treated for right Breast cancer, right; pathologic Stage IIA [id number] IDC RUIQ SURGICAL HISTORY  11/01/2018    right breast mastecetomy      Family History   Problem Relation Age of Onset   • Prostate Cancer Father 62        d. 58 metastatic   • Heart Attack Father         MI   • Cancer Mother 32        thyroid; lung @ 61 (smoker)   • (PAMELOR) 10 MG Oral Cap Take 3 tabs daily (Patient taking differently: Take 20 mg by mouth nightly.  Take 1 tabs daily ) Disp: 270 capsule Rfl: 0   [facility] VIT D 5000 HIGH-POTENCY 5000 UNIT OR CAPS 1 CAPSULE DAILY Disp:  Rfl:        Allergies:    Lymphazurin [ have any difficulty walking or getting up?: 0-No    Do you have any tripping hazards?: 0-No    Are you on multiple medications?: 1-Yes    Does pain affect your day to day activities?: 1-Yes     Have you had any memory issues?: 0-No    Fall/Risk Scoring: 3 results found for this or any previous visit. No flowsheet data found. Fecal Occult Blood Annually No results found for: FOB, OCCULTSTOOL No flowsheet data found.     Glaucoma Screening      Ophthalmology Visit Annually     Bone Density Screening      D results found for: A1C No flowsheet data found. Creat/alb ratio  Annually      LDL  Annually No results found for: LDL, LDLC No flowsheet data found. Dilated Eye exam  Annually No flowsheet data found. No flowsheet data found.     COPD      Spiromet extremities  Neurological: cranial nerves II-XII intact; light touch and proprioception intact  Skin: no rash or ulcerations  Breast: s/p right mastectomty       ASSESSMENT/PLAN:   Physical exam   S/p unilateral oophorectomy/ hysterectomy in 1992, so PAP n Carmela in 2018     Obesity, BMI 35-40  Body mass index is 35.66 kg/m².' advise exercise and weight loss    Acute bronchitis  Seen in ED on 9/10/19; s/p prednisone taper , and albuterol HFA 2 p QID prn, and Zithromax (Z-floresita) as directed x5d          RTC

## 2019-09-17 ENCOUNTER — PATIENT MESSAGE (OUTPATIENT)
Dept: INTERNAL MEDICINE CLINIC | Facility: CLINIC | Age: 64
End: 2019-09-17

## 2019-09-17 ENCOUNTER — TELEPHONE (OUTPATIENT)
Dept: INTERNAL MEDICINE CLINIC | Facility: CLINIC | Age: 64
End: 2019-09-17

## 2019-09-17 NOTE — TELEPHONE ENCOUNTER
From: Neno Grimm  To: Donavon Dance, MD  Sent: 9/17/2019 10:46 AM CDT  Subject: Test Results Question    My labs from [facility] weren't available during my visit Monday. I have attached a copy of [facility] results.  If this is the wrong for

## 2019-09-17 NOTE — TELEPHONE ENCOUNTER
Spoke to pt -- PA notification came in about albuterol; Pt states she has inhaler and paid cash; Explained that my message states Ventolin is covered and pharmacy should dispense the insurance covered formulation;    She will call back if needed

## 2019-10-03 ENCOUNTER — HOSPITAL ENCOUNTER (OUTPATIENT)
Dept: MAMMOGRAPHY | Age: 64
Discharge: HOME | End: 2019-10-03
Attending: INTERNAL MEDICINE
Payer: MEDICARE

## 2019-10-03 DIAGNOSIS — Z12.31 ENCOUNTER FOR SCREENING MAMMOGRAM FOR BREAST CANCER: ICD-10-CM

## 2019-10-03 PROCEDURE — 77067 SCR MAMMO BI INCL CAD: CPT | Performed by: INTERNAL MEDICINE

## 2019-10-03 PROCEDURE — 77063 BREAST TOMOSYNTHESIS BI: CPT | Performed by: INTERNAL MEDICINE

## 2019-10-30 ENCOUNTER — TELEPHONE (OUTPATIENT)
Dept: NEUROLOGY | Facility: CLINIC | Age: 64
End: 2019-10-30

## 2019-11-13 ENCOUNTER — OFFICE VISIT (OUTPATIENT)
Dept: HEMATOLOGY/ONCOLOGY | Facility: HOSPITAL | Age: 64
End: 2019-11-13
Attending: INTERNAL MEDICINE
Payer: MEDICARE

## 2019-11-13 VITALS
HEIGHT: 60 IN | OXYGEN SATURATION: 96 % | RESPIRATION RATE: 18 BRPM | SYSTOLIC BLOOD PRESSURE: 152 MMHG | DIASTOLIC BLOOD PRESSURE: 77 MMHG | WEIGHT: 181.88 LBS | BODY MASS INDEX: 35.71 KG/M2 | TEMPERATURE: 98 F | HEART RATE: 76 BPM

## 2019-11-13 DIAGNOSIS — Z79.899 ENCOUNTER FOR MONITORING ADJUVANT HORMONAL THERAPY: ICD-10-CM

## 2019-11-13 DIAGNOSIS — C50.911 STAGE I BREAST CANCER, RIGHT (HCC): Primary | ICD-10-CM

## 2019-11-13 DIAGNOSIS — M85.80 OSTEOPENIA, UNSPECIFIED LOCATION: ICD-10-CM

## 2019-11-13 DIAGNOSIS — M32.9 SYSTEMIC LUPUS ERYTHEMATOSUS, UNSPECIFIED SLE TYPE, UNSPECIFIED ORGAN INVOLVEMENT STATUS (HCC): ICD-10-CM

## 2019-11-13 DIAGNOSIS — Z51.81 ENCOUNTER FOR MONITORING ADJUVANT HORMONAL THERAPY: ICD-10-CM

## 2019-11-13 PROCEDURE — 99214 OFFICE O/P EST MOD 30 MIN: CPT | Performed by: INTERNAL MEDICINE

## 2019-11-13 NOTE — PROGRESS NOTES
Cancer Center Progress Note    Patient Name: Theodore Rosen   YOB: 1955   Medical Record Number: [id number]   Attending Physician: Flavio Perez M.D.      Chief Complaint:  Breast cancer    History of Present Illness:  Cancer history:  66 ye HYSTERECTOMY     • OSCAR BIOPSY STEREO NODULE 1 SITE RIGHT (CPT=19081)  09/26/2018   • OSCAR LOCALIZATION WIRE 1 SITE RIGHT (CPT=19281)  1992   • MASTECTOMY RIGHT  11/01/2018   • OTHER SURGICAL HISTORY  11/01/2018    right breast mastecetomy   • RADIATION RIGH week: Not on file        Minutes per session: Not on file      Stress: Not on file    Relationships      Social connections:        Talks on phone: Not on file        Gets together: Not on file        Attends Religion service: Not on file        Active me daily.  , Disp: , Rfl:   •  Nortriptyline HCl (PAMELOR) 10 MG Oral Cap, Take 3 tabs daily (Patient taking differently: Take 20 mg by mouth nightly.  Take 1 tabs daily ), Disp: 270 capsule, Rfl: 0  •  [facility]S VIT D 5000 HIGH-POTENCY 5000 UNIT OR CAPS, 1 CAPSULE 11/02/2018     11/02/2018    CO2 28 11/02/2018       Radiology:  none    Cancer Staging  Stage I breast cancer, right ([address]  Staging form: Breast, AJCC 8th Edition  - Clinical: cT2, cN1, cM0, ER: Positive, W[place]: Positive, HER2: Negative - Signed by Mar

## 2019-12-03 RX ORDER — FLUTICASONE PROPIONATE 50 MCG
SPRAY, SUSPENSION (ML) NASAL
Qty: 3 BOTTLE | Refills: 3 | Status: SHIPPED | OUTPATIENT
Start: 2019-12-03 | End: 2020-12-30

## 2019-12-03 RX ORDER — PANTOPRAZOLE SODIUM 40 MG/1
TABLET, DELAYED RELEASE ORAL
Qty: 90 TABLET | Refills: 0 | OUTPATIENT
Start: 2019-12-03

## 2019-12-03 RX ORDER — PANTOPRAZOLE SODIUM 40 MG/1
TABLET, DELAYED RELEASE ORAL
Qty: 90 TABLET | Refills: 3 | Status: SHIPPED | OUTPATIENT
Start: 2019-12-03 | End: 2020-12-06

## 2019-12-03 NOTE — TELEPHONE ENCOUNTER
Refill request is for a maintenance medication and has met the criteria specified in the Ambulatory Medication Refill Standing Order for eligibility, visits, laboratory, alerts and was sent to the requested pharmacy.   Was sent to [address] [place] [address] for Pa

## 2019-12-04 ENCOUNTER — TELEPHONE (OUTPATIENT)
Dept: NEUROLOGY | Facility: CLINIC | Age: 64
End: 2019-12-04

## 2019-12-04 NOTE — TELEPHONE ENCOUNTER
Pt called requesting an appointment as she has developed some Right sided sciatic pain that travels from Right buttocks down to foot. Pt states she has been taking Aleve with some relief.  Pain began the middle of last week while walking her dog, the dog pu

## 2019-12-18 ENCOUNTER — TELEPHONE (OUTPATIENT)
Dept: NEUROLOGY | Facility: CLINIC | Age: 64
End: 2019-12-18

## 2019-12-18 ENCOUNTER — OFFICE VISIT (OUTPATIENT)
Dept: NEUROLOGY | Facility: CLINIC | Age: 64
End: 2019-12-18
Payer: MEDICARE

## 2019-12-18 VITALS
WEIGHT: 181 LBS | DIASTOLIC BLOOD PRESSURE: 88 MMHG | BODY MASS INDEX: 35.53 KG/M2 | HEIGHT: 60 IN | RESPIRATION RATE: 18 BRPM | HEART RATE: 74 BPM | SYSTOLIC BLOOD PRESSURE: 138 MMHG

## 2019-12-18 DIAGNOSIS — M54.41 ACUTE RIGHT-SIDED LOW BACK PAIN WITH RIGHT-SIDED SCIATICA: ICD-10-CM

## 2019-12-18 DIAGNOSIS — Z17.0 MALIGNANT NEOPLASM OF RIGHT BREAST IN FEMALE, ESTROGEN RECEPTOR POSITIVE, UNSPECIFIED SITE OF BREAST (HCC): ICD-10-CM

## 2019-12-18 DIAGNOSIS — M54.16 LUMBAR RADICULOPATHY: Primary | ICD-10-CM

## 2019-12-18 DIAGNOSIS — C50.911 MALIGNANT NEOPLASM OF RIGHT BREAST IN FEMALE, ESTROGEN RECEPTOR POSITIVE, UNSPECIFIED SITE OF BREAST (HCC): ICD-10-CM

## 2019-12-18 PROCEDURE — 99214 OFFICE O/P EST MOD 30 MIN: CPT | Performed by: PHYSICAL MEDICINE & REHABILITATION

## 2019-12-18 RX ORDER — PREDNISONE 10 MG/1
10 TABLET ORAL DAILY
Qty: 28 EACH | Refills: 0 | Status: SHIPPED | OUTPATIENT
Start: 2019-12-18 | End: 2020-01-24

## 2019-12-18 RX ORDER — PREDNISONE 10 MG/1
10 TABLET ORAL DAILY
Qty: 28 EACH | Refills: 0 | Status: SHIPPED | OUTPATIENT
Start: 2019-12-18 | End: 2019-12-18

## 2019-12-18 NOTE — PROGRESS NOTES
Low Back Pain H & P    Chief Complaint: Patient presents with:  Low Back Pain: LOV: 8/1/19 - Pt f/u after developing Right sided LBP radiating into RLE to foot burning/numbness/tingling in foot, taking Tylenol PRN pain w/ some relief.  Pt notes no pain whil right breast   • Esophageal reflux    • Extrinsic asthma, unspecified    • Fibromyalgia    • Hiatal hernia with GERD    • Insomnia    • Osteopenia    • Peripheral neuropathy    • Raynaud's phenomenon    • SLE (systemic lupus erythematosus) ([facility]     Dr Wade Kind 81   • Prostate Cancer Paternal Uncle 76        seeds   • Breast Cancer Self        Social History   Social History    Socioeconomic History      Marital status:        Spouse name: Not on file      Number of children: 2      Years of education: Not History Narrative      The patient does not use an assistive device. .        The patient does not live in a home with stairs. Review of Systems      PE: The patient does appear in her stated age in no distress. The patient is well groomed.     Psychi radiculopathy    2. Acute right-sided low back pain with right-sided sciatica    3. Malignant neoplasm of right breast in female, estrogen receptor positive, unspecified site of breast [facility])      Plan  She will take a prednisone taper.     She will call nex

## 2019-12-18 NOTE — PATIENT INSTRUCTIONS
Plan  She will take a prednisone taper. She will call next week to let me know how she is doing. If she is not any better, then she will need to have a MRI of the lumbar spine. If she is doing better, then she will start PT.     If she is not d

## 2019-12-18 NOTE — TELEPHONE ENCOUNTER
[facility]  for authorization of approval of MRI L-spine wo cpt code 92841. Talked to Tricia Simmons who initiated request. Approved with Authorization # [id number]/18/19 to 01/17/19 Will call Pt. To inform.  L/m advising of approval. C

## 2020-01-11 ENCOUNTER — HOSPITAL ENCOUNTER (OUTPATIENT)
Dept: MRI IMAGING | Age: 65
Discharge: HOME | End: 2020-01-11
Attending: PHYSICAL MEDICINE & REHABILITATION
Payer: MEDICARE

## 2020-01-11 DIAGNOSIS — Z17.0 MALIGNANT NEOPLASM OF RIGHT BREAST IN FEMALE, ESTROGEN RECEPTOR POSITIVE, UNSPECIFIED SITE OF BREAST (HCC): ICD-10-CM

## 2020-01-11 DIAGNOSIS — M54.41 ACUTE RIGHT-SIDED LOW BACK PAIN WITH RIGHT-SIDED SCIATICA: ICD-10-CM

## 2020-01-11 DIAGNOSIS — M54.16 LUMBAR RADICULOPATHY: ICD-10-CM

## 2020-01-11 DIAGNOSIS — C50.911 MALIGNANT NEOPLASM OF RIGHT BREAST IN FEMALE, ESTROGEN RECEPTOR POSITIVE, UNSPECIFIED SITE OF BREAST (HCC): ICD-10-CM

## 2020-01-11 PROCEDURE — 72148 MRI LUMBAR SPINE W/O DYE: CPT | Performed by: PHYSICAL MEDICINE & REHABILITATION

## 2020-01-12 PROBLEM — M51.9 LUMBAR DISC DISEASE: Status: ACTIVE | Noted: 2020-01-12

## 2020-01-12 PROBLEM — M48.061 LUMBAR FORAMINAL STENOSIS: Status: ACTIVE | Noted: 2020-01-12

## 2020-01-12 PROBLEM — M43.16 SPONDYLOLISTHESIS OF LUMBAR REGION: Status: ACTIVE | Noted: 2020-01-12

## 2020-01-13 ENCOUNTER — TELEPHONE (OUTPATIENT)
Dept: NEUROLOGY | Facility: CLINIC | Age: 65
End: 2020-01-13

## 2020-01-13 ENCOUNTER — TELEPHONE (OUTPATIENT)
Dept: INTERNAL MEDICINE CLINIC | Facility: CLINIC | Age: 65
End: 2020-01-13

## 2020-01-13 DIAGNOSIS — N28.89 LEFT RENAL MASS: Primary | ICD-10-CM

## 2020-01-13 NOTE — TELEPHONE ENCOUNTER
Pt. Has an MRI done on her back and it was discover that has has a lesion on her kidney pt. Wants to know if she needs an order for an ultrasound of the kidneys pt. Has an appt. 01/17/2020 please advise ph.  # [phone]   Routed to clinical N/A

## 2020-01-13 NOTE — TELEPHONE ENCOUNTER
S/w pt who verbalized understanding of results - will reach out to PCP regarding lesion. Per Dr. Hollis Ibanez, pt should f/u in the office. Offered 1/27/20 appt at 10:30. Pt accepted.

## 2020-01-13 NOTE — TELEPHONE ENCOUNTER
----- Message from Ervin Bautista MD sent at 1/12/2020  5:45 AM CST -----  The L1-2 disc is somewhat worse and rest of the discs are about the same since last time. The is a lesion oon the left kidney which will need further evaluation.    The radiologist

## 2020-01-13 NOTE — TELEPHONE ENCOUNTER
I spoke with patient and relayed Dr. Duran Breath message. She verbalized understanding. She says she has the number for scheduling.

## 2020-01-13 NOTE — TELEPHONE ENCOUNTER
Recent MRI lumbar spine showed there is a 1.4 cm T2 hypointense left lower pole renal cortical lesion    Imp- left kidney lesion;  Rec- renal ultrasound ordered for further evaluation; please call pt

## 2020-01-22 ENCOUNTER — TELEPHONE (OUTPATIENT)
Dept: INTERNAL MEDICINE CLINIC | Facility: CLINIC | Age: 65
End: 2020-01-22

## 2020-01-22 ENCOUNTER — HOSPITAL ENCOUNTER (OUTPATIENT)
Dept: ULTRASOUND IMAGING | Age: 65
Discharge: HOME | End: 2020-01-22
Attending: INTERNAL MEDICINE
Payer: MEDICARE

## 2020-01-22 DIAGNOSIS — N28.89 LEFT RENAL MASS: ICD-10-CM

## 2020-01-22 PROCEDURE — 76775 US EXAM ABDO BACK WALL LIM: CPT | Performed by: INTERNAL MEDICINE

## 2020-01-24 ENCOUNTER — OFFICE VISIT (OUTPATIENT)
Dept: INTERNAL MEDICINE CLINIC | Facility: CLINIC | Age: 65
End: 2020-01-24
Payer: MEDICARE

## 2020-01-24 VITALS
HEART RATE: 87 BPM | DIASTOLIC BLOOD PRESSURE: 82 MMHG | WEIGHT: 180.63 LBS | OXYGEN SATURATION: 96 % | HEIGHT: 60 IN | TEMPERATURE: 98 F | SYSTOLIC BLOOD PRESSURE: 122 MMHG | BODY MASS INDEX: 35.46 KG/M2

## 2020-01-24 DIAGNOSIS — I89.0 LYMPHEDEMA OF RIGHT ARM: ICD-10-CM

## 2020-01-24 DIAGNOSIS — G89.29 CHRONIC NONINTRACTABLE HEADACHE, UNSPECIFIED HEADACHE TYPE: ICD-10-CM

## 2020-01-24 DIAGNOSIS — N28.89 LEFT RENAL MASS: Primary | ICD-10-CM

## 2020-01-24 DIAGNOSIS — M32.9 SYSTEMIC LUPUS ERYTHEMATOSUS, UNSPECIFIED SLE TYPE, UNSPECIFIED ORGAN INVOLVEMENT STATUS (HCC): ICD-10-CM

## 2020-01-24 DIAGNOSIS — R51.9 CHRONIC NONINTRACTABLE HEADACHE, UNSPECIFIED HEADACHE TYPE: ICD-10-CM

## 2020-01-24 DIAGNOSIS — Z17.0 MALIGNANT NEOPLASM OF UPPER-INNER QUADRANT OF RIGHT BREAST IN FEMALE, ESTROGEN RECEPTOR POSITIVE (HCC): ICD-10-CM

## 2020-01-24 DIAGNOSIS — C50.211 MALIGNANT NEOPLASM OF UPPER-INNER QUADRANT OF RIGHT BREAST IN FEMALE, ESTROGEN RECEPTOR POSITIVE (HCC): ICD-10-CM

## 2020-01-24 DIAGNOSIS — M47.816 LUMBAR SPONDYLOSIS: ICD-10-CM

## 2020-01-24 PROCEDURE — 99214 OFFICE O/P EST MOD 30 MIN: CPT | Performed by: INTERNAL MEDICINE

## 2020-01-24 RX ORDER — GABAPENTIN 300 MG/1
600 CAPSULE ORAL 3 TIMES DAILY
Qty: 540 CAPSULE | Refills: 3 | Status: SHIPPED | OUTPATIENT
Start: 2020-01-24 | End: 2020-01-24

## 2020-01-24 RX ORDER — GABAPENTIN 300 MG/1
600 CAPSULE ORAL 3 TIMES DAILY
Qty: 540 CAPSULE | Refills: 3 | Status: SHIPPED | OUTPATIENT
Start: 2020-01-24 | End: 2021-01-13
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
## 2020-01-27 ENCOUNTER — OFFICE VISIT (OUTPATIENT)
Dept: NEUROLOGY | Facility: CLINIC | Age: 65
End: 2020-01-27
Payer: MEDICARE

## 2020-01-27 ENCOUNTER — TELEPHONE (OUTPATIENT)
Dept: NEUROLOGY | Facility: CLINIC | Age: 65
End: 2020-01-27

## 2020-01-27 ENCOUNTER — TELEPHONE (OUTPATIENT)
Dept: INTERNAL MEDICINE CLINIC | Facility: CLINIC | Age: 65
End: 2020-01-27

## 2020-01-27 VITALS
RESPIRATION RATE: 17 BRPM | SYSTOLIC BLOOD PRESSURE: 128 MMHG | BODY MASS INDEX: 35.34 KG/M2 | WEIGHT: 180 LBS | HEIGHT: 60 IN | DIASTOLIC BLOOD PRESSURE: 78 MMHG | HEART RATE: 88 BPM

## 2020-01-27 DIAGNOSIS — M51.26 LUMBAR HERNIATED DISC: ICD-10-CM

## 2020-01-27 DIAGNOSIS — M51.9 LUMBAR DISC DISEASE: ICD-10-CM

## 2020-01-27 DIAGNOSIS — G62.9 PERIPHERAL POLYNEUROPATHY: ICD-10-CM

## 2020-01-27 DIAGNOSIS — M54.16 LUMBAR RADICULOPATHY: Primary | ICD-10-CM

## 2020-01-27 DIAGNOSIS — M43.16 SPONDYLOLISTHESIS OF LUMBAR REGION: ICD-10-CM

## 2020-01-27 DIAGNOSIS — M48.061 LUMBAR FORAMINAL STENOSIS: ICD-10-CM

## 2020-01-27 PROCEDURE — 99214 OFFICE O/P EST MOD 30 MIN: CPT | Performed by: PHYSICAL MEDICINE & REHABILITATION

## 2020-01-27 NOTE — PATIENT INSTRUCTIONS
Plan  I will perform left L5 TFESI(s) and right S1 TFESI. She will get into PT for the lumbar spine. She will continue with the Neurontin for now.     The patient will follow up in 2-3 months, but the patient will call me 2 weeks after having the in

## 2020-01-27 NOTE — TELEPHONE ENCOUNTER
[facility] LEFT L5 (L5-S1) & RIGHT S1 LUMBAR TRANSFORAMINAL EPIDURAL INJECTION CPT CODES 07118,[id number],80624. Faxed Clinical notes to [address] [place] [address]. Pending Review.  Will inform once injections are

## 2020-01-27 NOTE — PROGRESS NOTES
Low Back Pain H & P    Chief Complaint: Patient presents with:  Low Back Pain: :LOV: 12/18/19 - MRI Lumbar Spine review 1/11/20, Kidney US 1/22/20. Pt presents for lumbar spine review. Nursing note reviewed and verified.     Patient was last seen on 12/ Performed by Maria Dolores Alcala MD at [address] [place] [address] MAIN OR   • BREAST SENTINEL LYMPH NODE BIOPSY Right 11/1/2018    Performed by Maria Dolores Alcala MD at [address] [place] [address] OR   • Marsha [address]    laparoscopic    • COLONOSCOPY  12/12/2014   • ELECTROCARDIOGRAM, COM file      Transportation needs:        Medical: Not on file        Non-medical: Not on file    Tobacco Use      Smoking status: Never Smoker      Smokeless tobacco: Never Used    Substance and Sexual Activity      Alcohol use: No      Drug use: No      Sex no kern icterus. Pupils are equal, round, and reactive to light. No redness or discharge bilaterally. Skin:  There are no rashes or lesions.     Vitals:   01/27/20  1041   BP: 128/78   Pulse: 88   Resp: 17       Gait:    Gait: Normal gait   Sit to Stand: having the injection to let me know how the injection worked. The patient understands and agrees with the stated plan. Ernesto Pappas MD  1/27/2020

## 2020-01-27 NOTE — TELEPHONE ENCOUNTER
Evelyn Rincon from Dr. Ambrosio'/s office is calling they would like pt. An epidural steroid injection in the lumbar spine they need a clearance from Dr. John Paniagua ph. # [phone] option 2  Fax.  # [phone]   Routed to clinical

## 2020-01-27 NOTE — TELEPHONE ENCOUNTER
Per Dr. Zaragoza Sales direction, pt needs clearance from Dr. Kristen Ramirez to proceed w/ scheduling lumbar TFESI as he is working her up for kidney mass. Left message w/ request for clearance with Matilde Mcmahan in Dr. Tiffani Red office. Will await approval to schedule.

## 2020-01-28 NOTE — TELEPHONE ENCOUNTER
Received fax from [employer] [facility] of authorization and approval for LEFT L5 (L5-S1) & RIGHT S1 LUMBAR TRANSFORAMINAL EPIDURAL INJ CPT CODES 39330,[id number],05718. Authorization# [id number] effective 01/27/2 to 3/12/20. Will inform Nursing.

## 2020-01-28 NOTE — TELEPHONE ENCOUNTER
Received approval from Dr. Alfonzo Closs for patient to proceed w/ TFESI as she is medically stable. Patient has been scheduled for a Left L5, Right S1 TFESI under local on 2/4/20 at the [facility]. Medications and allergies reviewed.  Patient informed to hold asp

## 2020-01-29 ENCOUNTER — MED REC SCAN ONLY (OUTPATIENT)
Dept: NEUROLOGY | Facility: CLINIC | Age: 65
End: 2020-01-29

## 2020-02-04 ENCOUNTER — OFFICE VISIT (OUTPATIENT)
Dept: SURGERY | Facility: CLINIC | Age: 65
End: 2020-02-04
Payer: MEDICARE

## 2020-02-04 DIAGNOSIS — M51.9 LUMBAR DISC DISEASE: ICD-10-CM

## 2020-02-04 DIAGNOSIS — M51.26 LUMBAR HERNIATED DISC: ICD-10-CM

## 2020-02-04 DIAGNOSIS — M54.16 LUMBAR RADICULOPATHY: Primary | ICD-10-CM

## 2020-02-04 PROCEDURE — 64484 NJX AA&/STRD TFRM EPI L/S EA: CPT | Performed by: PHYSICAL MEDICINE & REHABILITATION

## 2020-02-04 PROCEDURE — 64483 NJX AA&/STRD TFRM EPI L/S 1: CPT | Performed by: PHYSICAL MEDICINE & REHABILITATION

## 2020-02-04 NOTE — PROCEDURES
[facility] [address] [facility]    BILATERAL LUMBAR TRANSFORAMINAL   NAME:  J Carlos Luz    MR #:    [id number] :  1955     PHYSICIAN:  Dilip Ambrosio        Operative Report    DATE OF PROCEDURE: 2020   PREOPERATIVE DIAGNOSES: 1. right epidurogram indicating correct needle placement. Then, aspiration was performed. No blood, fluid, or air was aspirated.   Then, the patient was injected with a 3 cc solution of 1.5 cc of 40 mg/cc of Kenalog and 1.5 cc of 1% PF lidocaine without epinephrin

## 2020-02-11 ENCOUNTER — HOSPITAL ENCOUNTER (OUTPATIENT)
Dept: MRI IMAGING | Facility: HOSPITAL | Age: 65
Discharge: HOME | End: 2020-02-11
Attending: INTERNAL MEDICINE
Payer: MEDICARE

## 2020-02-11 DIAGNOSIS — N28.89 LEFT RENAL MASS: ICD-10-CM

## 2020-02-11 LAB — CREAT BLD-MCNC: 0.6 MG/DL (ref 0.55–1.02)

## 2020-02-11 PROCEDURE — A9575 INJ GADOTERATE MEGLUMI 0.1ML: HCPCS | Performed by: INTERNAL MEDICINE

## 2020-02-11 PROCEDURE — 74183 MRI ABD W/O CNTR FLWD CNTR: CPT | Performed by: INTERNAL MEDICINE

## 2020-02-11 PROCEDURE — 82565 ASSAY OF CREATININE: CPT

## 2020-02-12 ENCOUNTER — TELEPHONE (OUTPATIENT)
Dept: INTERNAL MEDICINE CLINIC | Facility: CLINIC | Age: 65
End: 2020-02-12

## 2020-02-12 DIAGNOSIS — N28.89 LEFT RENAL MASS: Primary | ICD-10-CM

## 2020-02-12 NOTE — TELEPHONE ENCOUNTER
MRI abdomen on 2/11/20 shows T2 hypointense 1.1 cm left renal cortical lesion in the inferior pole demonstrates mild enhancement consistent with a small neoplasm. A renal cell carcinoma cannot be excluded. Imp- renal mass;  Rec- advise referral to [facility]

## 2020-02-19 ENCOUNTER — TELEPHONE (OUTPATIENT)
Dept: NEUROLOGY | Facility: CLINIC | Age: 65
End: 2020-02-19

## 2020-02-19 NOTE — TELEPHONE ENCOUNTER
Patient calling with condition update post left L5 and right S1 TFESIs 2/4/20    Patient feels she did not receive any (0%) relief in symptoms. Continues to have the same pain in the lower back and right buttocks. Left side low back Pain to touch.  Pain inc

## 2020-02-26 ENCOUNTER — OFFICE VISIT (OUTPATIENT)
Dept: PHYSICAL THERAPY | Facility: HOSPITAL | Age: 65
End: 2020-02-26
Attending: PHYSICAL MEDICINE & REHABILITATION
Payer: MEDICARE

## 2020-02-26 DIAGNOSIS — M51.9 LUMBAR DISC DISEASE: ICD-10-CM

## 2020-02-26 DIAGNOSIS — G62.9 PERIPHERAL POLYNEUROPATHY: ICD-10-CM

## 2020-02-26 DIAGNOSIS — M43.16 SPONDYLOLISTHESIS OF LUMBAR REGION: ICD-10-CM

## 2020-02-26 DIAGNOSIS — M51.26 LUMBAR HERNIATED DISC: ICD-10-CM

## 2020-02-26 DIAGNOSIS — M54.16 LUMBAR RADICULOPATHY: ICD-10-CM

## 2020-02-26 DIAGNOSIS — M48.061 LUMBAR FORAMINAL STENOSIS: ICD-10-CM

## 2020-02-26 PROCEDURE — 97163 PT EVAL HIGH COMPLEX 45 MIN: CPT

## 2020-02-26 PROCEDURE — 97110 THERAPEUTIC EXERCISES: CPT

## 2020-02-26 NOTE — PROGRESS NOTES
LUMBAR SPINE EVALUATION:   Referring Physician: Dr. Joseph Kang  Diagnosis: Lumbar radiculopathy (M54.16)  Peripheral polyneuropathy (G62.9)  Lumbar disc disease (M51.9)  Spondylolisthesis of lumbar region (M43.16)  Lumbar foraminal stenosis (M48.061)  Lumba include return to prior level of function. Current functional limitations include: Sitting longer than 15 mins, driving, sweeping/mopping, cleaning home, stepping off a curb, sit to stand.    Disturbed Sleep: 3-4x a week     Past Medical History:   Diagn foraminal stenosis. L5-S1:   Moderate spinal canal stenosis, predominantly related to ventral and dorsal epidural lipomatosis. No significant neural foraminal or lateral recess compromise.    =====  CONCLUSION:      1.  Mild multilevel degenerative change Kori Bro demonstrates relevant lateral shift that responded to shift correction procedure. Pt was given this for HEP with precautions she stated she understood.  Pt with multiple comorbidities  including lymphedema, radical R mastectomy with re Dermatomes  Light Left Right   Proximal medial thigh (L2) WNL WNL   Above knee (L3) WNL WNL   Medial arch (L4) Decreased Decreased   Between 1st – 2nd toes (L5) Decreased Decreased   Lateral border of foot (S1) Decreased Decreased   Popliteal fossa (S2 will be able to perform sit to stand with 2/10 pain or less and proper kinematics to decrease risk of falling with activity. Frequency / Duration: Patient will be seen for 2 x/week or a total of 12 visits over a 90 day period. Treatment will include:  Kandice Range

## 2020-03-04 ENCOUNTER — OFFICE VISIT (OUTPATIENT)
Dept: PHYSICAL THERAPY | Facility: HOSPITAL | Age: 65
End: 2020-03-04
Attending: PHYSICAL MEDICINE & REHABILITATION
Payer: MEDICARE

## 2020-03-04 PROCEDURE — 97140 MANUAL THERAPY 1/> REGIONS: CPT

## 2020-03-04 PROCEDURE — 97110 THERAPEUTIC EXERCISES: CPT

## 2020-03-04 NOTE — PROGRESS NOTES
Dx: Lumbar radiculopathy (M54.16)  Peripheral polyneuropathy (G62.9)  Lumbar disc disease (M51.9)  Spondylolisthesis of lumbar region (M43.16)  Lumbar foraminal stenosis (M48.061)  Lumbar herniated disc (M51.26)           Authorized # of Visits:  2/12 posture to improve spinal loading    Charges: Connor 1 Manual 2   Total Timed Treatment: 45 min     Total Treatment Time: 45 min

## 2020-03-09 ENCOUNTER — OFFICE VISIT (OUTPATIENT)
Dept: PHYSICAL THERAPY | Facility: HOSPITAL | Age: 65
End: 2020-03-09
Attending: PHYSICAL MEDICINE & REHABILITATION
Payer: MEDICARE

## 2020-03-09 PROCEDURE — 97140 MANUAL THERAPY 1/> REGIONS: CPT

## 2020-03-09 PROCEDURE — 97110 THERAPEUTIC EXERCISES: CPT

## 2020-03-09 NOTE — PROGRESS NOTES
Dx: Lumbar radiculopathy (M54.16)  Peripheral polyneuropathy (G62.9)  Lumbar disc disease (M51.9)  Spondylolisthesis of lumbar region (M43.16)  Lumbar foraminal stenosis (M48.061)  Lumbar herniated disc (M51.26)           Authorized # of Visits:  2/12 improve selfcare ADL performance  3. Claudine Sanchez will be able to perform sit to stand with 2/10 pain or less and proper kinematics to decrease risk of falling with activity.     Plan:  Continue with POC with focus on improving posture to improve spina

## 2020-03-11 ENCOUNTER — OFFICE VISIT (OUTPATIENT)
Dept: PHYSICAL THERAPY | Facility: HOSPITAL | Age: 65
End: 2020-03-11
Attending: PHYSICAL MEDICINE & REHABILITATION
Payer: MEDICARE

## 2020-03-11 PROCEDURE — 97110 THERAPEUTIC EXERCISES: CPT

## 2020-03-11 PROCEDURE — 97140 MANUAL THERAPY 1/> REGIONS: CPT

## 2020-03-11 NOTE — PROGRESS NOTES
Dx: Lumbar radiculopathy (M54.16)  Peripheral polyneuropathy (G62.9)  Lumbar disc disease (M51.9)  Spondylolisthesis of lumbar region (M43.16)  Lumbar foraminal stenosis (M48.061)  Lumbar herniated disc (M51.26)           Authorized # of Visits:  4/12 perform sit to stand with 2/10 pain or less and proper kinematics to decrease risk of falling with activity.     Plan:  Continue with POC with focus on improving posture to improve spinal loading    Charges: Connor 2 Manual 1   Total Timed Treatment: 45 min

## 2020-03-16 ENCOUNTER — TELEPHONE (OUTPATIENT)
Dept: PHYSICAL THERAPY | Facility: HOSPITAL | Age: 65
End: 2020-03-16

## 2020-03-16 ENCOUNTER — APPOINTMENT (OUTPATIENT)
Dept: PHYSICAL THERAPY | Facility: HOSPITAL | Age: 65
End: 2020-03-16
Attending: PHYSICAL MEDICINE & REHABILITATION
Payer: MEDICARE

## 2020-03-16 NOTE — TELEPHONE ENCOUNTER
Pt called regarding medically fragile presentation. Pt wishes to hold PT for 2 weeks. PT in agreement that this is the prudent decision. Pt to have HEP emailed to her.      Ashly Ruiz, PT, DPT, CSCS

## 2020-03-17 ENCOUNTER — OFFICE VISIT (OUTPATIENT)
Dept: SURGERY | Facility: CLINIC | Age: 65
End: 2020-03-17
Payer: MEDICARE

## 2020-03-17 VITALS
WEIGHT: 176 LBS | TEMPERATURE: 98 F | HEART RATE: 87 BPM | DIASTOLIC BLOOD PRESSURE: 86 MMHG | SYSTOLIC BLOOD PRESSURE: 126 MMHG | BODY MASS INDEX: 34 KG/M2

## 2020-03-17 DIAGNOSIS — N28.89 LEFT RENAL MASS: Primary | ICD-10-CM

## 2020-03-17 PROCEDURE — 99204 OFFICE O/P NEW MOD 45 MIN: CPT | Performed by: UROLOGY

## 2020-03-17 NOTE — PROGRESS NOTES
[facility], [facility]  Initial Office Consultation    HPI:   Zeus Jackson is a 60year old female here today for consultation at the request of, and a copy of this note will be sent to, Liset Byrnes MD.    1. Left Renal Mass (cT1a Nx Mx)  Incid asthma, unspecified    • Fibromyalgia    • Hiatal hernia with GERD    • Insomnia    • Osteopenia    • Peripheral neuropathy    • Raynaud's phenomenon    • SLE (systemic lupus erythematosus) ([facility]     Dr Alida Gaviria   • Stress fracture of the metatarsals     4 LABS:  No results found. IMAGING:    MRI Abdomen +/- IV Contrast (02/2020):  1. T2 hypointense 1.1 cm left renal cortical lesion in the inferior pole demonstrates mild enhancement consistent with a small neoplasm.   A renal cell carcinoma cannot be reviewed the various clinical and pathological stages and assigned the patient a clinical stage based on their clinical and radiographic evaluation. We had a lengthy discussion regarding the various treatment options for presumed renal cell carcinoma.  Mary Lindo

## 2020-03-18 ENCOUNTER — APPOINTMENT (OUTPATIENT)
Dept: PHYSICAL THERAPY | Facility: HOSPITAL | Age: 65
End: 2020-03-18
Attending: PHYSICAL MEDICINE & REHABILITATION
Payer: MEDICARE

## 2020-03-23 ENCOUNTER — APPOINTMENT (OUTPATIENT)
Dept: PHYSICAL THERAPY | Facility: HOSPITAL | Age: 65
End: 2020-03-23
Attending: PHYSICAL MEDICINE & REHABILITATION
Payer: MEDICARE

## 2020-03-25 ENCOUNTER — APPOINTMENT (OUTPATIENT)
Dept: PHYSICAL THERAPY | Facility: HOSPITAL | Age: 65
End: 2020-03-25
Attending: PHYSICAL MEDICINE & REHABILITATION
Payer: MEDICARE

## 2020-03-25 ENCOUNTER — TELEPHONE (OUTPATIENT)
Dept: PHYSICAL THERAPY | Facility: HOSPITAL | Age: 65
End: 2020-03-25

## 2020-03-25 NOTE — TELEPHONE ENCOUNTER
Contacted pt to discuss department's initiative to protect all patient's from COVID-19 exposure. Discussed recommendations relative to pt's HEP and POC. Pt reports min increased forearm tightness. Pt reports her lymphedema volume has been variable.  Jacek

## 2020-04-06 ENCOUNTER — APPOINTMENT (OUTPATIENT)
Dept: PHYSICAL THERAPY | Facility: HOSPITAL | Age: 65
End: 2020-04-06
Attending: INTERNAL MEDICINE
Payer: MEDICARE

## 2020-04-07 ENCOUNTER — TELEPHONE (OUTPATIENT)
Dept: PHYSICAL THERAPY | Facility: HOSPITAL | Age: 65
End: 2020-04-07

## 2020-04-07 NOTE — TELEPHONE ENCOUNTER
Contacted pt to discuss department's initiative to protect all patient's from COVID-19 exposure. Discussed patient's current symptoms, home program, and concerns. Arm swelling continues to be at baseline with variable presentation.  Continues to report

## 2020-04-08 ENCOUNTER — APPOINTMENT (OUTPATIENT)
Dept: PHYSICAL THERAPY | Facility: HOSPITAL | Age: 65
End: 2020-04-08
Attending: INTERNAL MEDICINE
Payer: MEDICARE

## 2020-04-14 ENCOUNTER — APPOINTMENT (OUTPATIENT)
Dept: PHYSICAL THERAPY | Facility: HOSPITAL | Age: 65
End: 2020-04-14
Attending: INTERNAL MEDICINE
Payer: MEDICARE

## 2020-04-16 ENCOUNTER — APPOINTMENT (OUTPATIENT)
Dept: PHYSICAL THERAPY | Facility: HOSPITAL | Age: 65
End: 2020-04-16
Attending: INTERNAL MEDICINE
Payer: MEDICARE

## 2020-04-21 ENCOUNTER — APPOINTMENT (OUTPATIENT)
Dept: PHYSICAL THERAPY | Facility: HOSPITAL | Age: 65
End: 2020-04-21
Attending: INTERNAL MEDICINE
Payer: MEDICARE

## 2020-04-21 ENCOUNTER — TELEPHONE (OUTPATIENT)
Dept: PHYSICAL THERAPY | Facility: HOSPITAL | Age: 65
End: 2020-04-21

## 2020-04-21 NOTE — TELEPHONE ENCOUNTER
Message left regarding possibility of doing a virtual visit for this patient. Call back requested and number given.

## 2020-04-22 ENCOUNTER — TELEPHONE (OUTPATIENT)
Dept: PHYSICAL THERAPY | Facility: HOSPITAL | Age: 65
End: 2020-04-22

## 2020-04-23 ENCOUNTER — APPOINTMENT (OUTPATIENT)
Dept: PHYSICAL THERAPY | Facility: HOSPITAL | Age: 65
End: 2020-04-23
Attending: INTERNAL MEDICINE
Payer: MEDICARE

## 2020-04-28 ENCOUNTER — APPOINTMENT (OUTPATIENT)
Dept: PHYSICAL THERAPY | Facility: HOSPITAL | Age: 65
End: 2020-04-28
Attending: INTERNAL MEDICINE
Payer: MEDICARE

## 2020-04-29 NOTE — TELEPHONE ENCOUNTER
Left voice mail message for patient. Ok to schedule for video follow up. LOV 2/4/20 for left L5 and right S1 TFESI.

## 2020-04-30 ENCOUNTER — TELEPHONE (OUTPATIENT)
Dept: PHYSICAL THERAPY | Facility: HOSPITAL | Age: 65
End: 2020-04-30

## 2020-04-30 ENCOUNTER — APPOINTMENT (OUTPATIENT)
Dept: PHYSICAL THERAPY | Facility: HOSPITAL | Age: 65
End: 2020-04-30
Attending: INTERNAL MEDICINE
Payer: MEDICARE

## 2020-04-30 NOTE — TELEPHONE ENCOUNTER
VM left for patient regarding visit scheduled for next week. Call back requested regarding if patient wishes to attend that visit. Clinic number given for call back.

## 2020-05-05 ENCOUNTER — APPOINTMENT (OUTPATIENT)
Dept: PHYSICAL THERAPY | Facility: HOSPITAL | Age: 65
End: 2020-05-05
Attending: INTERNAL MEDICINE
Payer: MEDICARE

## 2020-05-07 ENCOUNTER — APPOINTMENT (OUTPATIENT)
Dept: PHYSICAL THERAPY | Facility: HOSPITAL | Age: 65
End: 2020-05-07
Attending: INTERNAL MEDICINE
Payer: MEDICARE

## 2020-05-11 ENCOUNTER — TELEPHONE (OUTPATIENT)
Dept: PHYSICAL THERAPY | Facility: HOSPITAL | Age: 65
End: 2020-05-11

## 2020-05-12 ENCOUNTER — TELEPHONE (OUTPATIENT)
Dept: PHYSICAL THERAPY | Facility: HOSPITAL | Age: 65
End: 2020-05-12

## 2020-05-12 ENCOUNTER — APPOINTMENT (OUTPATIENT)
Dept: PHYSICAL THERAPY | Facility: HOSPITAL | Age: 65
End: 2020-05-12
Attending: INTERNAL MEDICINE
Payer: MEDICARE

## 2020-05-12 NOTE — TELEPHONE ENCOUNTER
Pt called regarding concerns about returning to clinic due to [address]. Pt wishes to return to the clinic after June 1.  Pt re-scheduled

## 2020-05-13 ENCOUNTER — TELEPHONE (OUTPATIENT)
Dept: HEMATOLOGY/ONCOLOGY | Facility: HOSPITAL | Age: 65
End: 2020-05-13

## 2020-05-13 ENCOUNTER — VIRTUAL PHONE E/M (OUTPATIENT)
Dept: HEMATOLOGY/ONCOLOGY | Facility: HOSPITAL | Age: 65
End: 2020-05-13
Attending: INTERNAL MEDICINE
Payer: MEDICARE

## 2020-05-13 DIAGNOSIS — Z17.0 MALIGNANT NEOPLASM OF RIGHT BREAST IN FEMALE, ESTROGEN RECEPTOR POSITIVE, UNSPECIFIED SITE OF BREAST (HCC): ICD-10-CM

## 2020-05-13 DIAGNOSIS — R23.2 HOT FLASHES: ICD-10-CM

## 2020-05-13 DIAGNOSIS — N28.9 RENAL LESION: ICD-10-CM

## 2020-05-13 DIAGNOSIS — C50.911 MALIGNANT NEOPLASM OF RIGHT BREAST IN FEMALE, ESTROGEN RECEPTOR POSITIVE, UNSPECIFIED SITE OF BREAST (HCC): ICD-10-CM

## 2020-05-13 DIAGNOSIS — Z12.31 ENCOUNTER FOR SCREENING MAMMOGRAM FOR BREAST CANCER: Primary | ICD-10-CM

## 2020-05-13 DIAGNOSIS — Z78.0 MENOPAUSE: ICD-10-CM

## 2020-05-13 PROCEDURE — 99442 PHONE E/M BY PHYS 11-20 MIN: CPT | Performed by: INTERNAL MEDICINE

## 2020-05-13 NOTE — PROGRESS NOTES
Virtual/Telephone Check-In    Lilliana Geiger verbally consents to a Virtual/Telephone Check-In service on 05/13/20. Patient understands and accepts financial responsibility for any deductible, co-insurance and/or co-pays associated with this service. History:  Past Surgical History:   Procedure Laterality Date   • ANKLE FRACTURE SURGERY Left    • BREAST MODIFIED RADICAL MASTECTOMY Right 11/1/2018    Performed by Ora Benoit MD at [address] [place] [address] OR   • BREAST SENTINEL LYMPH NODE BIOPSY Right 11/1/2018 on file    Occupational History      Not on file    Social Needs      Financial resource strain: Not on file      Food insecurity:        Worry: Not on file        Inability: Not on file      Transportation needs:        Medical: Not on file        Non-med capsule, Rfl: 3  •  FLUTICASONE PROPIONATE 50 MCG/ACT Nasal Suspension, USE 2 SPRAYS IN EACH NOSTRIL EVERY DAY, Disp: 3 Bottle, Rfl: 3  •  Pantoprazole Sodium 40 MG Oral Tab EC, TAKE 1 TABLET EVERY MORNING BEFORE BREAKFAST, Disp: 90 tablet, Rfl: 3  •  magn 11/02/2018       Radiology:  none    Cancer Staging  Stage I breast cancer, right ([address]  Staging form: Breast, AJCC 8th Edition  - Clinical: cT2, cN1, cM0, ER: Positive, AK: Positive, HER2: Negative - Signed by Neo Stokes MD on 11/9/2018  - Pathol

## 2020-05-13 NOTE — TELEPHONE ENCOUNTER
----- Message from Xochitl Linares RN sent at 2020 12:01 PM CDT -----  Regardin rivas f/u  Nivia Avila    Can we set malick up for a 6 mon f/u please? Please make sure pt knows to have bone scan and mammo done prior to the visit. Thanks!     Sarwat Argueta

## 2020-05-14 ENCOUNTER — APPOINTMENT (OUTPATIENT)
Dept: PHYSICAL THERAPY | Facility: HOSPITAL | Age: 65
End: 2020-05-14
Attending: INTERNAL MEDICINE
Payer: MEDICARE

## 2020-05-19 ENCOUNTER — TELEPHONE (OUTPATIENT)
Dept: HEMATOLOGY/ONCOLOGY | Facility: HOSPITAL | Age: 65
End: 2020-05-19

## 2020-05-19 ENCOUNTER — APPOINTMENT (OUTPATIENT)
Dept: PHYSICAL THERAPY | Facility: HOSPITAL | Age: 65
End: 2020-05-19
Attending: INTERNAL MEDICINE
Payer: MEDICARE

## 2020-05-21 ENCOUNTER — APPOINTMENT (OUTPATIENT)
Dept: PHYSICAL THERAPY | Facility: HOSPITAL | Age: 65
End: 2020-05-21
Attending: INTERNAL MEDICINE
Payer: MEDICARE

## 2020-05-26 ENCOUNTER — APPOINTMENT (OUTPATIENT)
Dept: PHYSICAL THERAPY | Facility: HOSPITAL | Age: 65
End: 2020-05-26
Attending: INTERNAL MEDICINE
Payer: MEDICARE

## 2020-05-28 ENCOUNTER — APPOINTMENT (OUTPATIENT)
Dept: PHYSICAL THERAPY | Facility: HOSPITAL | Age: 65
End: 2020-05-28
Attending: INTERNAL MEDICINE
Payer: MEDICARE

## 2020-06-02 ENCOUNTER — OFFICE VISIT (OUTPATIENT)
Dept: PHYSICAL THERAPY | Facility: HOSPITAL | Age: 65
End: 2020-06-02
Attending: INTERNAL MEDICINE
Payer: MEDICARE

## 2020-06-02 PROCEDURE — 97110 THERAPEUTIC EXERCISES: CPT

## 2020-06-02 PROCEDURE — 97140 MANUAL THERAPY 1/> REGIONS: CPT

## 2020-06-02 NOTE — PROGRESS NOTES
Dx: Lumbar radiculopathy (M54.16)  Peripheral polyneuropathy (G62.9)  Lumbar disc disease (M51.9)  Spondylolisthesis of lumbar region (M43.16)  Lumbar foraminal stenosis (M48.061)  Lumbar herniated disc (M51.26)           Authorized # of Visits:  4/12 mobilization with breathing 10 mins 10 mns 5 mins 10 mins      STM R lat, R axilla 13 mins 5 mins 5 mins 5 mins      TherEx:          Reassessment    20 mins      Posture assessment 5 mins 5 mins        Peggy  1.  Repeated ext in standing  2. R repeated rib mobility with breathing assessment. Pt continues to demonstrate multi-origin low back pain but her improvement in functional status is a positive overall prognostic sign.  Dulce Maria Michaud is agreeable to returning to PT to maximize her functional stat

## 2020-06-04 ENCOUNTER — OFFICE VISIT (OUTPATIENT)
Dept: PHYSICAL THERAPY | Facility: HOSPITAL | Age: 65
End: 2020-06-04
Attending: INTERNAL MEDICINE
Payer: MEDICARE

## 2020-06-04 PROCEDURE — 97110 THERAPEUTIC EXERCISES: CPT

## 2020-06-04 PROCEDURE — 97140 MANUAL THERAPY 1/> REGIONS: CPT

## 2020-06-04 NOTE — PROGRESS NOTES
Dx: Lumbar radiculopathy (M54.16)  Peripheral polyneuropathy (G62.9)  Lumbar disc disease (M51.9)  Spondylolisthesis of lumbar region (M43.16)  Lumbar foraminal stenosis (M48.061)  Lumbar herniated disc (M51.26)           Authorized # of Visits:  4/12 pain post intervention. Tiffani Kassidy with improved upright posture with decreased R trunk lean post intervention. Tiffani Kassidy will continue to be progressed as per tolerance with focus on improving neutral spinal alignment and functional status.

## 2020-06-09 ENCOUNTER — OFFICE VISIT (OUTPATIENT)
Dept: PHYSICAL THERAPY | Facility: HOSPITAL | Age: 65
End: 2020-06-09
Attending: INTERNAL MEDICINE
Payer: MEDICARE

## 2020-06-09 PROCEDURE — 97140 MANUAL THERAPY 1/> REGIONS: CPT

## 2020-06-09 PROCEDURE — 97110 THERAPEUTIC EXERCISES: CPT

## 2020-06-09 NOTE — PROGRESS NOTES
Dx: Lumbar radiculopathy (M54.16)  Peripheral polyneuropathy (G62.9)  Lumbar disc disease (M51.9)  Spondylolisthesis of lumbar region (M43.16)  Lumbar foraminal stenosis (M48.061)  Lumbar herniated disc (M51.26)           Authorized # of Visits:  7/12 demonstrated altered L sided rib mobility and increased paraspinal muscle tone. These areas are improving with current interventions. Goals: To be met in 90 days  1.  Jean-Paul Nguyen will demonstrate score of 64/100 on FOTO to demonstrate return to ma

## 2020-06-11 ENCOUNTER — OFFICE VISIT (OUTPATIENT)
Dept: PHYSICAL THERAPY | Facility: HOSPITAL | Age: 65
End: 2020-06-11
Attending: INTERNAL MEDICINE
Payer: MEDICARE

## 2020-06-11 PROCEDURE — 97530 THERAPEUTIC ACTIVITIES: CPT

## 2020-06-11 PROCEDURE — 97140 MANUAL THERAPY 1/> REGIONS: CPT

## 2020-06-11 NOTE — PROGRESS NOTES
Dx: Lumbar radiculopathy (M54.16)  Peripheral polyneuropathy (G62.9)  Lumbar disc disease (M51.9)  Spondylolisthesis of lumbar region (M43.16)  Lumbar foraminal stenosis (M48.061)  Lumbar herniated disc (M51.26)           Authorized # of Visits:  8/12 Will continue to progress as per tolerance to maximize functional status. Goals: To be met in 90 days  1. Jass Landon will demonstrate score of 64/100 on FOTO to demonstrate return to maximum functional performance.    Renay MCCONNELL [address] will rep

## 2020-06-16 ENCOUNTER — OFFICE VISIT (OUTPATIENT)
Dept: PHYSICAL THERAPY | Facility: HOSPITAL | Age: 65
End: 2020-06-16
Attending: INTERNAL MEDICINE
Payer: MEDICARE

## 2020-06-16 ENCOUNTER — TELEPHONE (OUTPATIENT)
Dept: HEMATOLOGY/ONCOLOGY | Facility: HOSPITAL | Age: 65
End: 2020-06-16

## 2020-06-16 PROCEDURE — 97110 THERAPEUTIC EXERCISES: CPT

## 2020-06-16 NOTE — PROGRESS NOTES
Dx: Lumbar radiculopathy (M54.16)  Peripheral polyneuropathy (G62.9)  Lumbar disc disease (M51.9)  Spondylolisthesis of lumbar region (M43.16)  Lumbar foraminal stenosis (M48.061)  Lumbar herniated disc (M51.26)           Authorized # of Visits:  8/12 tolerance with focus on stability interventions. Goals: To be met in 90 days  1. Tiffani Yi will demonstrate score of 64/100 on FOTO to demonstrate return to maximum functional performance.    2. Tiffani Yi will report she is able to clean

## 2020-06-18 ENCOUNTER — OFFICE VISIT (OUTPATIENT)
Dept: PHYSICAL THERAPY | Facility: HOSPITAL | Age: 65
End: 2020-06-18
Attending: INTERNAL MEDICINE
Payer: MEDICARE

## 2020-06-18 PROCEDURE — 97112 NEUROMUSCULAR REEDUCATION: CPT

## 2020-06-18 PROCEDURE — 97110 THERAPEUTIC EXERCISES: CPT

## 2020-06-18 NOTE — PROGRESS NOTES
Dx: Lumbar radiculopathy (M54.16)  Peripheral polyneuropathy (G62.9)  Lumbar disc disease (M51.9)  Spondylolisthesis of lumbar region (M43.16)  Lumbar foraminal stenosis (M48.061)  Lumbar herniated disc (M51.26)           Authorized # of Visits:  10/12 to increased rib moblity and pec minor activation. Goals: To be met in 90 days  1. Neno Grimm will demonstrate score of 64/100 on FOTO to demonstrate return to maximum functional performance.    2. Neno Grimm will report she is able to dennis

## 2020-06-23 ENCOUNTER — OFFICE VISIT (OUTPATIENT)
Dept: PHYSICAL THERAPY | Facility: HOSPITAL | Age: 65
End: 2020-06-23
Attending: INTERNAL MEDICINE
Payer: MEDICARE

## 2020-06-23 PROCEDURE — 97110 THERAPEUTIC EXERCISES: CPT

## 2020-06-23 NOTE — PROGRESS NOTES
Dx: Lumbar radiculopathy (M54.16)  Peripheral polyneuropathy (G62.9)  Lumbar disc disease (M51.9)  Spondylolisthesis of lumbar region (M43.16)  Lumbar foraminal stenosis (M48.061)  Lumbar herniated disc (M51.26)           Authorized # of Visits:  11/12 2x20 1. 2x20     Cat/Camel      3x10 3x10     Pec Major hands behind head       3x45 sec     Neuro Re-education:            Pelvic Tilts   1. A/P  2. Lateral  3.  Circles (B)   1-3 2x20    1-3 2x20                             Modalities:            Peggy Timed Treatment: 45 min     Total Treatment Time: 45 min

## 2020-06-25 ENCOUNTER — APPOINTMENT (OUTPATIENT)
Dept: PHYSICAL THERAPY | Facility: HOSPITAL | Age: 65
End: 2020-06-25
Attending: INTERNAL MEDICINE
Payer: MEDICARE

## 2020-06-30 ENCOUNTER — OFFICE VISIT (OUTPATIENT)
Dept: PHYSICAL THERAPY | Facility: HOSPITAL | Age: 65
End: 2020-06-30
Attending: INTERNAL MEDICINE
Payer: MEDICARE

## 2020-06-30 PROCEDURE — 97110 THERAPEUTIC EXERCISES: CPT

## 2020-06-30 NOTE — PROGRESS NOTES
LUMBAR SPINE REASSESSMENT:   Referring Physician: Dr. Kyle Okeefe  Diagnosis: Lumbar radiculopathy (M54.16)  Peripheral polyneuropathy (G62.9)  Lumbar disc disease (M51.9)  Spondylolisthesis of lumbar region (M43.16)  Lumbar foraminal stenosis (M48.061)  Lum Neuropathy  Worst with: Sitting longer than 15 mins (current 3 hours), driving (current 30 mins pain free (does not drive longer than that), sweeping/mopping (current: resolved), cleaning home (current: greatly improved but has difficulty with reaching ove 2018   • OTHER SURGICAL HISTORY  2018    right breast mastecetomy   • RADIATION RIGHT           Imagin/2019 Lspine MRI  LUMBAR DISC LEVELS:  L1-L2:   Diffuse disc bulge with ligamentum flavum redundancy and dorsal epidural denies  Bladder/Bowel Function: Pt denies  Gait Dysfunction: Pt denies  Surgeries in the Last 6 Months: Pt denies  Accidents in the Last 6 Months: Pt denies  Unexplained Weight Loss: Pt denies  Night Pain: Pt denies  Personal History of Cancer: Breast CA. prior level of function.     Precautions:  Cancer, osteoporosis    OBJECTIVE:   Observation/Posture:  Lordosis: Reduced  Lateral Shift: L sided  Correction of Posture: Better    Gait Deviations: WNL    AROM Major Limitation: 75+% Limited Moderate Limitation Day    double knee to chest with swiss ball -  Repeat 15 Times, Hold 1 Second(s), Complete 2 Sets, Perform 1 Times a Day    Diaphragmatic breathing laying on L side    Charges:  Therex 3     Total Timed Treatment: 38 min     Total Treatment Time: 38 min

## 2020-07-09 ENCOUNTER — APPOINTMENT (OUTPATIENT)
Dept: PHYSICAL THERAPY | Facility: HOSPITAL | Age: 65
End: 2020-07-09
Attending: INTERNAL MEDICINE
Payer: MEDICARE

## 2020-07-16 ENCOUNTER — APPOINTMENT (OUTPATIENT)
Dept: PHYSICAL THERAPY | Facility: HOSPITAL | Age: 65
End: 2020-07-16
Attending: INTERNAL MEDICINE
Payer: MEDICARE

## 2020-07-17 RX ORDER — LETROZOLE 2.5 MG/1
2.5 TABLET, FILM COATED ORAL DAILY
Qty: 90 TABLET | Refills: 3 | Status: SHIPPED | OUTPATIENT
Start: 2020-07-17 | End: 2021-06-21

## 2020-07-21 ENCOUNTER — APPOINTMENT (OUTPATIENT)
Dept: PHYSICAL THERAPY | Facility: HOSPITAL | Age: 65
End: 2020-07-21
Attending: INTERNAL MEDICINE
Payer: MEDICARE

## 2020-07-23 ENCOUNTER — APPOINTMENT (OUTPATIENT)
Dept: PHYSICAL THERAPY | Facility: HOSPITAL | Age: 65
End: 2020-07-23
Attending: INTERNAL MEDICINE
Payer: MEDICARE

## 2020-08-07 ENCOUNTER — TELEPHONE (OUTPATIENT)
Dept: CASE MANAGEMENT | Age: 65
End: 2020-08-07

## 2020-08-07 NOTE — TELEPHONE ENCOUNTER
Patient is eligible for a 2020 Medicare Advantage Supervisit. Left message to call back [phone].

## 2020-09-02 ENCOUNTER — TELEPHONE (OUTPATIENT)
Dept: CASE MANAGEMENT | Age: 65
End: 2020-09-02

## 2020-09-02 NOTE — TELEPHONE ENCOUNTER
Patient is eligible for a 2020 Medicare Advantage Supervisit. Left message to call back [phone].

## 2020-09-10 ENCOUNTER — HOSPITAL ENCOUNTER (OUTPATIENT)
Dept: CT IMAGING | Facility: HOSPITAL | Age: 65
Discharge: HOME | End: 2020-09-10
Attending: UROLOGY
Payer: MEDICARE

## 2020-09-10 ENCOUNTER — PATIENT MESSAGE (OUTPATIENT)
Dept: NEUROLOGY | Facility: CLINIC | Age: 65
End: 2020-09-10

## 2020-09-10 DIAGNOSIS — N28.89 LEFT RENAL MASS: ICD-10-CM

## 2020-09-10 LAB — CREAT BLD-MCNC: 0.8 MG/DL (ref 0.55–1.02)

## 2020-09-10 PROCEDURE — 82565 ASSAY OF CREATININE: CPT

## 2020-09-10 PROCEDURE — 74170 CT ABD WO CNTRST FLWD CNTRST: CPT | Performed by: UROLOGY

## 2020-09-12 ENCOUNTER — HOSPITAL ENCOUNTER (OUTPATIENT)
Dept: BONE DENSITY | Age: 65
Discharge: HOME | End: 2020-09-12
Attending: INTERNAL MEDICINE
Payer: MEDICARE

## 2020-09-12 ENCOUNTER — HOSPITAL ENCOUNTER (OUTPATIENT)
Dept: MAMMOGRAPHY | Age: 65
Discharge: HOME | End: 2020-09-12
Attending: INTERNAL MEDICINE
Payer: MEDICARE

## 2020-09-12 DIAGNOSIS — Z78.0 MENOPAUSE: ICD-10-CM

## 2020-09-12 DIAGNOSIS — Z12.31 ENCOUNTER FOR SCREENING MAMMOGRAM FOR BREAST CANCER: ICD-10-CM

## 2020-09-12 PROCEDURE — 77063 BREAST TOMOSYNTHESIS BI: CPT | Performed by: INTERNAL MEDICINE

## 2020-09-12 PROCEDURE — 77067 SCR MAMMO BI INCL CAD: CPT | Performed by: INTERNAL MEDICINE

## 2020-09-12 PROCEDURE — 77080 DXA BONE DENSITY AXIAL: CPT | Performed by: INTERNAL MEDICINE

## 2020-09-14 ENCOUNTER — PATIENT MESSAGE (OUTPATIENT)
Dept: NEUROLOGY | Facility: CLINIC | Age: 65
End: 2020-09-14

## 2020-09-14 ENCOUNTER — TELEPHONE (OUTPATIENT)
Dept: NEUROLOGY | Facility: CLINIC | Age: 65
End: 2020-09-14

## 2020-09-14 DIAGNOSIS — M65.331 TRIGGER MIDDLE FINGER OF RIGHT HAND: Primary | ICD-10-CM

## 2020-09-14 NOTE — TELEPHONE ENCOUNTER
Patient has appt 9/16/20  Order placed for Right 3rd finger flexor tendon sheath injection at the site of the A1 pulley under ultrasound guidance under Dr. Daily Cuenca direction

## 2020-09-14 NOTE — TELEPHONE ENCOUNTER
Called [employer] [employer]  for authorization of approval of Right 3rd finger flexor tendon sheath injection at the site of the A1 pulley under ultrasound guidance  cpt code 87598. Talked to       Avtar Roper. who states authorization nor referral is requir

## 2020-09-14 NOTE — TELEPHONE ENCOUNTER
From: Sammy Avila  To: Wes Schaeffer MD  Sent: 9/14/2020 7:51 AM CDT  Subject: Non-Urgent Medical Question    Yes, this is the same finger as last time. Why am I unable to reply to your question? I had to open another question box.     Beatrice Schirmer

## 2020-09-15 ENCOUNTER — TELEPHONE (OUTPATIENT)
Dept: INTERNAL MEDICINE CLINIC | Facility: CLINIC | Age: 65
End: 2020-09-15

## 2020-09-15 RX ORDER — CEPHALEXIN 500 MG/1
500 CAPSULE ORAL 2 TIMES DAILY
Qty: 14 CAPSULE | Refills: 0 | Status: SHIPPED | OUTPATIENT
Start: 2020-09-15 | End: 2020-11-03 | Stop reason: ALTCHOICE

## 2020-09-15 NOTE — TELEPHONE ENCOUNTER
To nursing, please tell patient I sent Rx for cephalexin 500 mg twice daily #14 to the [facility] pharmacy in [facility]. Ask her to call Dr. Forrest England tomorrow with update on her status. If worse sooner, go to ER. Thanks.     Note to self–  [facility] 9/4/20–U

## 2020-09-15 NOTE — TELEPHONE ENCOUNTER
Spoke with patient. She reports she developed urinary symptoms today at 730 am. Patient reports a \"downwarn pull\" with urination, burning with urination, and frequency. She denies gross hematuria, fevers/chills, or flank pain.  Patient reports history of

## 2020-09-15 NOTE — TELEPHONE ENCOUNTER
Patient calling for prescription for UTI  Woke up this morning with pelvic pain and frequent urination. No temp. [facility] Marlena.     Best number to contact patient at [phone]

## 2020-09-16 ENCOUNTER — OFFICE VISIT (OUTPATIENT)
Dept: NEUROLOGY | Facility: CLINIC | Age: 65
End: 2020-09-16
Payer: MEDICARE

## 2020-09-16 DIAGNOSIS — M65.331 TRIGGER MIDDLE FINGER OF RIGHT HAND: Primary | ICD-10-CM

## 2020-09-16 DIAGNOSIS — M48.02 CERVICAL STENOSIS OF SPINE: ICD-10-CM

## 2020-09-16 PROCEDURE — 20550 NJX 1 TENDON SHEATH/LIGAMENT: CPT | Performed by: PHYSICAL MEDICINE & REHABILITATION

## 2020-09-16 PROCEDURE — 76942 ECHO GUIDE FOR BIOPSY: CPT | Performed by: PHYSICAL MEDICINE & REHABILITATION

## 2020-09-16 RX ORDER — LIDOCAINE HYDROCHLORIDE 10 MG/ML
0.5 INJECTION, SOLUTION INFILTRATION; PERINEURAL ONCE
Status: COMPLETED | OUTPATIENT
Start: 2020-09-16 | End: 2020-09-16

## 2020-09-16 RX ORDER — TRIAMCINOLONE ACETONIDE 40 MG/ML
40 INJECTION, SUSPENSION INTRA-ARTICULAR; INTRAMUSCULAR ONCE
Status: COMPLETED | OUTPATIENT
Start: 2020-09-16 | End: 2020-09-16

## 2020-09-16 RX ADMIN — LIDOCAINE HYDROCHLORIDE 0.5 ML: 10 INJECTION, SOLUTION INFILTRATION; PERINEURAL at 12:12:00

## 2020-09-16 RX ADMIN — TRIAMCINOLONE ACETONIDE 40 MG: 40 INJECTION, SUSPENSION INTRA-ARTICULAR; INTRAMUSCULAR at 12:13:00

## 2020-09-17 ENCOUNTER — TELEPHONE (OUTPATIENT)
Dept: CASE MANAGEMENT | Age: 65
End: 2020-09-17

## 2020-09-17 ENCOUNTER — OFFICE VISIT (OUTPATIENT)
Dept: SURGERY | Facility: CLINIC | Age: 65
End: 2020-09-17
Payer: MEDICARE

## 2020-09-17 VITALS
HEART RATE: 78 BPM | WEIGHT: 176 LBS | SYSTOLIC BLOOD PRESSURE: 145 MMHG | HEIGHT: 60 IN | DIASTOLIC BLOOD PRESSURE: 84 MMHG | BODY MASS INDEX: 34.55 KG/M2 | TEMPERATURE: 99 F

## 2020-09-17 DIAGNOSIS — N28.89 LEFT RENAL MASS: Primary | ICD-10-CM

## 2020-09-17 PROCEDURE — 99213 OFFICE O/P EST LOW 20 MIN: CPT | Performed by: UROLOGY

## 2020-09-17 NOTE — PROGRESS NOTES
[facility], [facility]  Follow-Up Visit    HPI: Naz Jiménez is a 63year old female presents for a follow up visit. Patient was last seen on 3/17/20. INTERVAL HISTORY: Here for follow-up on small left renal mass, on active surveillance.  Denies fl Patient has a strong family history of prostate cancer in her father, brother, and paternal uncle. She has a maternal uncle with history of bladder cancer. Multiple cousins and aunts with thyroid, breast, lung cancer and lymphoma.       Reviewed past medi the inferior pole is difficult to visualize sonographically as it is relatively isoechoic to the adjacent renal parenchyma.  While this lesion could represent a hemorrhagic/proteinaceous cyst, it remains indeterminate as it is difficult to assess. Bill Keith t

## 2020-09-17 NOTE — TELEPHONE ENCOUNTER
Patient is eligible for a 2020 Medicare Annual Wellness visit. Left message to call back [phone].

## 2020-09-18 ENCOUNTER — HOSPITAL ENCOUNTER (OUTPATIENT)
Dept: MAMMOGRAPHY | Facility: HOSPITAL | Age: 65
Discharge: HOME | End: 2020-09-18
Attending: INTERNAL MEDICINE
Payer: MEDICARE

## 2020-09-18 DIAGNOSIS — R92.8 ABNORMAL MAMMOGRAM: ICD-10-CM

## 2020-09-18 PROCEDURE — 77065 DX MAMMO INCL CAD UNI: CPT | Performed by: INTERNAL MEDICINE

## 2020-09-18 PROCEDURE — 77061 BREAST TOMOSYNTHESIS UNI: CPT | Performed by: INTERNAL MEDICINE

## 2020-09-25 ENCOUNTER — PATIENT MESSAGE (OUTPATIENT)
Dept: INTERNAL MEDICINE CLINIC | Facility: CLINIC | Age: 65
End: 2020-09-25

## 2020-09-28 ENCOUNTER — TELEPHONE (OUTPATIENT)
Dept: INTERNAL MEDICINE CLINIC | Facility: CLINIC | Age: 65
End: 2020-09-28

## 2020-09-28 NOTE — TELEPHONE ENCOUNTER
Allergy profile updated to include cephalexin    Would watch for any diarrhea; if develops diarrhea, advise call office back    Please call pt

## 2020-09-28 NOTE — TELEPHONE ENCOUNTER
Surendra Newton  to Celso Smith MD          9/25/20 8:32 PM  I was recently given Cephalexin for a UTI. My last dose was Monday 9/21/20 @ 10:00 PM.  I awoke Tuesday with cramping in my lower stomach, which continued until Wednesday evening.   Rubén

## 2020-09-28 NOTE — TELEPHONE ENCOUNTER
Called patient and left detailed message relaying Dr Miller Lot message on voicemail, ok per hipaa, advised to call back with any further questions or concerns.

## 2020-09-28 NOTE — TELEPHONE ENCOUNTER
From: Claudine Sanchez  To: Dot Weber MD  Sent: 9/25/2020 8:32 PM CDT  Subject: Prescription Question    I was recently given Cephalexin for a UTI.  My last dose was Monday 9/21/20 @ 10:00 PM. I awoke Tuesday with cramping in my lower stomach, whic

## 2020-10-02 ENCOUNTER — TELEPHONE (OUTPATIENT)
Dept: CASE MANAGEMENT | Age: 65
End: 2020-10-02

## 2020-10-02 NOTE — TELEPHONE ENCOUNTER
Patient is eligible for a 2020 Medicare Annual Wellness visit. Left message to call back [phone].

## 2020-10-08 ENCOUNTER — MED REC SCAN ONLY (OUTPATIENT)
Dept: NEUROLOGY | Facility: CLINIC | Age: 65
End: 2020-10-08

## 2020-10-08 ENCOUNTER — TELEPHONE (OUTPATIENT)
Dept: INTERNAL MEDICINE CLINIC | Facility: CLINIC | Age: 65
End: 2020-10-08

## 2020-10-08 DIAGNOSIS — N30.00 ACUTE CYSTITIS WITHOUT HEMATURIA: Primary | ICD-10-CM

## 2020-10-08 RX ORDER — CIPROFLOXACIN 500 MG/1
500 TABLET, FILM COATED ORAL 2 TIMES DAILY
Qty: 14 TABLET | Refills: 0 | Status: SHIPPED | OUTPATIENT
Start: 2020-10-08 | End: 2020-11-03 | Stop reason: ALTCHOICE

## 2020-10-08 NOTE — TELEPHONE ENCOUNTER
To Dr. Miguel Grayson - see pt message below. Sudden urge to urinate, c/o dysuria, \"pulling pain in pelvis\". Temp 97.1 - chills with pain when urinating.

## 2020-10-08 NOTE — TELEPHONE ENCOUNTER
Patient is calling 2 hours she started experiencing pelvic pain during urination, frequent urination, chills & burning  She is just getting over another UTI, she has been off the antibiotic for 2 weeks    Patient would like a different medication than Ceph

## 2020-10-09 ENCOUNTER — LAB ENCOUNTER (OUTPATIENT)
Dept: LAB | Age: 65
End: 2020-10-09
Attending: INTERNAL MEDICINE
Payer: MEDICARE

## 2020-10-09 DIAGNOSIS — N30.00 ACUTE CYSTITIS WITHOUT HEMATURIA: ICD-10-CM

## 2020-10-09 PROCEDURE — 81001 URINALYSIS AUTO W/SCOPE: CPT

## 2020-10-09 PROCEDURE — 87086 URINE CULTURE/COLONY COUNT: CPT

## 2020-10-15 NOTE — TELEPHONE ENCOUNTER
Please call pt, finishing Cipro today for UTI  Pt was up all night to urinate, no other symptoms, only frequency  Please call to discuss/advise  Tasked to nursing

## 2020-10-15 NOTE — TELEPHONE ENCOUNTER
Pt called; had urinary urgency last night; last UA C&S on 10/8/20 neg for UTI; last dose of Cipro; consider bladder spasm if sxs persist; pt called

## 2020-10-15 NOTE — TELEPHONE ENCOUNTER
Spoke to Lacie Scheuermann. She will take her last cipro pill today. The symptoms that she first called about on 10/8 subsided after the third cipro pill    Last night between 1am-530 am she repots getting up 10-12 times to urinate.  Every time she lay down and st

## 2020-11-03 ENCOUNTER — OFFICE VISIT (OUTPATIENT)
Dept: HEMATOLOGY/ONCOLOGY | Facility: HOSPITAL | Age: 65
End: 2020-11-03
Attending: INTERNAL MEDICINE
Payer: MEDICARE

## 2020-11-03 VITALS
DIASTOLIC BLOOD PRESSURE: 85 MMHG | SYSTOLIC BLOOD PRESSURE: 153 MMHG | WEIGHT: 179 LBS | RESPIRATION RATE: 18 BRPM | OXYGEN SATURATION: 96 % | HEART RATE: 80 BPM | TEMPERATURE: 97 F | BODY MASS INDEX: 35.14 KG/M2 | HEIGHT: 60 IN

## 2020-11-03 DIAGNOSIS — C50.911 MALIGNANT NEOPLASM OF RIGHT BREAST IN FEMALE, ESTROGEN RECEPTOR POSITIVE, UNSPECIFIED SITE OF BREAST (HCC): Primary | ICD-10-CM

## 2020-11-03 DIAGNOSIS — Z92.3 HISTORY OF RADIATION THERAPY: ICD-10-CM

## 2020-11-03 DIAGNOSIS — C50.911 STAGE I BREAST CANCER, RIGHT (HCC): ICD-10-CM

## 2020-11-03 DIAGNOSIS — Z17.0 MALIGNANT NEOPLASM OF RIGHT BREAST IN FEMALE, ESTROGEN RECEPTOR POSITIVE, UNSPECIFIED SITE OF BREAST (HCC): Primary | ICD-10-CM

## 2020-11-03 DIAGNOSIS — I89.0 LYMPHEDEMA: ICD-10-CM

## 2020-11-03 PROCEDURE — 99214 OFFICE O/P EST MOD 30 MIN: CPT | Performed by: INTERNAL MEDICINE

## 2020-11-03 NOTE — PROGRESS NOTES
Cancer Center Progress Note    Patient Name: Dulce Maria Michaud   YOB: 1955   Medical Record Number: [id number]   Attending Physician: Jailyn Brown M.D.      Chief Complaint:  Breast cancer    History of Present Illness:  Cancer history:  63 ye • HYSTERECTOMY  1992    laparoscopic (endometriosis)   • OSCAR BIOPSY STEREO NODULE 1 SITE RIGHT (CPT=19081)  09/26/2018   • OSCAR LOCALIZATION WIRE 1 SITE RIGHT (CPT=19281)  1992   • MASTECTOMY RIGHT  11/01/2018   • OTHER SURGICAL HISTORY  11/01/2018    rig Sexual activity: Not on file    Lifestyle      Physical activity        Days per week: Not on file        Minutes per session: Not on file      Stress: Not on file    Relationships      Social connections        Talks on phone: Not on file        Gets toge 400 MG Oral Tab, Take 1 tablet (400 mg total) by mouth daily. , Disp: 90 tablet, Rfl: 3  •  Hydroxychloroquine Sulfate (PLAQUENIL) 200 MG Oral Tab, Take 1 tablet by mouth daily.   , Disp: , Rfl:   •  Nortriptyline HCl (PAMELOR) 10 MG Oral Cap, Take 3 tabs da AST 16 05/02/2014    ALT 11 05/02/2014    BILT 0.2 05/02/2014    TP 7.3 05/02/2014    ALB 4.5 05/02/2014    GLOBULT 2.8 05/02/2014    ALBGLOBRAT 1.6 05/02/2014     11/02/2018    K 4.2 11/02/2018     11/02/2018    CO2 28 11/02/2018       [employer]iol[employer]

## 2020-11-25 NOTE — TELEPHONE ENCOUNTER
To FD pls call pt to schedule an annual OV then back to RX. Last physical September 2019, last OV January 2020. Thanks!

## 2020-12-06 RX ORDER — PANTOPRAZOLE SODIUM 40 MG/1
TABLET, DELAYED RELEASE ORAL
Qty: 90 TABLET | Refills: 0 | Status: SHIPPED | OUTPATIENT
Start: 2020-12-06 | End: 2021-01-13

## 2020-12-06 NOTE — TELEPHONE ENCOUNTER
Noted, refill sent. Requested Prescriptions     Signed Prescriptions Disp Refills   • PANTOPRAZOLE SODIUM 40 MG Oral Tab EC 90 tablet 0     Sig: TAKE 1 TABLET EVERY MORNING BEFORE BREAKFAST     Authorizing Provider:  Stephany Ledezma     Ordering User:

## 2020-12-30 RX ORDER — FLUTICASONE PROPIONATE 50 MCG
SPRAY, SUSPENSION (ML) NASAL
Qty: 3 BOTTLE | Refills: 3 | Status: SHIPPED | OUTPATIENT
Start: 2020-12-30 | End: 2021-10-21

## 2021-01-04 ENCOUNTER — APPOINTMENT (OUTPATIENT)
Dept: HEMATOLOGY/ONCOLOGY | Facility: HOSPITAL | Age: 66
End: 2021-01-04
Attending: INTERNAL MEDICINE
Payer: MEDICARE

## 2021-01-13 ENCOUNTER — OFFICE VISIT (OUTPATIENT)
Dept: INTERNAL MEDICINE CLINIC | Facility: CLINIC | Age: 66
End: 2021-01-13
Payer: MEDICARE

## 2021-01-13 VITALS
TEMPERATURE: 99 F | HEIGHT: 60 IN | OXYGEN SATURATION: 98 % | HEART RATE: 84 BPM | DIASTOLIC BLOOD PRESSURE: 94 MMHG | SYSTOLIC BLOOD PRESSURE: 132 MMHG | BODY MASS INDEX: 35.34 KG/M2 | WEIGHT: 180 LBS

## 2021-01-13 DIAGNOSIS — R51.9 CHRONIC NONINTRACTABLE HEADACHE, UNSPECIFIED HEADACHE TYPE: ICD-10-CM

## 2021-01-13 DIAGNOSIS — E55.9 VITAMIN D DEFICIENCY: ICD-10-CM

## 2021-01-13 DIAGNOSIS — M47.816 LUMBAR SPONDYLOSIS: ICD-10-CM

## 2021-01-13 DIAGNOSIS — M65.331 TRIGGER MIDDLE FINGER OF RIGHT HAND: ICD-10-CM

## 2021-01-13 DIAGNOSIS — G62.9 PERIPHERAL POLYNEUROPATHY: ICD-10-CM

## 2021-01-13 DIAGNOSIS — C50.911 PRIMARY CANCER OF RIGHT BREAST WITH STAGE 2 NODAL METASTASIS PER AMERICAN JOINT COMMITTEE ON CANCER 7TH EDITION (N2) (HCC): ICD-10-CM

## 2021-01-13 DIAGNOSIS — E66.01 SEVERE OBESITY (BMI 35.0-39.9) WITH COMORBIDITY (HCC): ICD-10-CM

## 2021-01-13 DIAGNOSIS — M81.8 OTHER OSTEOPOROSIS, UNSPECIFIED PATHOLOGICAL FRACTURE PRESENCE: ICD-10-CM

## 2021-01-13 DIAGNOSIS — E78.00 HYPERCHOLESTEREMIA: ICD-10-CM

## 2021-01-13 DIAGNOSIS — G89.29 CHRONIC NONINTRACTABLE HEADACHE, UNSPECIFIED HEADACHE TYPE: ICD-10-CM

## 2021-01-13 DIAGNOSIS — I89.0 LYMPHEDEMA OF RIGHT ARM: ICD-10-CM

## 2021-01-13 DIAGNOSIS — K21.9 GASTROESOPHAGEAL REFLUX DISEASE, UNSPECIFIED WHETHER ESOPHAGITIS PRESENT: ICD-10-CM

## 2021-01-13 DIAGNOSIS — Z00.00 PHYSICAL EXAM, ANNUAL: Primary | ICD-10-CM

## 2021-01-13 DIAGNOSIS — N28.89 LEFT RENAL MASS: ICD-10-CM

## 2021-01-13 DIAGNOSIS — C77.9 PRIMARY CANCER OF RIGHT BREAST WITH STAGE 2 NODAL METASTASIS PER AMERICAN JOINT COMMITTEE ON CANCER 7TH EDITION (N2) (HCC): ICD-10-CM

## 2021-01-13 DIAGNOSIS — K64.8 INTERNAL HEMORRHOID: ICD-10-CM

## 2021-01-13 DIAGNOSIS — Z86.59 HISTORY OF DEPRESSION: ICD-10-CM

## 2021-01-13 DIAGNOSIS — M32.9 SYSTEMIC LUPUS ERYTHEMATOSUS, UNSPECIFIED SLE TYPE, UNSPECIFIED ORGAN INVOLVEMENT STATUS (HCC): ICD-10-CM

## 2021-01-13 PROCEDURE — 3008F BODY MASS INDEX DOCD: CPT | Performed by: INTERNAL MEDICINE

## 2021-01-13 PROCEDURE — 99396 PREV VISIT EST AGE 40-64: CPT | Performed by: INTERNAL MEDICINE

## 2021-01-13 PROCEDURE — G0439 PPPS, SUBSEQ VISIT: HCPCS | Performed by: INTERNAL MEDICINE

## 2021-01-13 PROCEDURE — 96160 PT-FOCUSED HLTH RISK ASSMT: CPT | Performed by: INTERNAL MEDICINE

## 2021-01-13 PROCEDURE — 3080F DIAST BP >= 90 MM HG: CPT | Performed by: INTERNAL MEDICINE

## 2021-01-13 PROCEDURE — 3075F SYST BP GE 130 - 139MM HG: CPT | Performed by: INTERNAL MEDICINE

## 2021-01-13 RX ORDER — PANTOPRAZOLE SODIUM 40 MG/1
40 TABLET, DELAYED RELEASE ORAL
Qty: 90 TABLET | Refills: 3 | Status: SHIPPED | OUTPATIENT
Start: 2021-01-13 | End: 2021-12-10

## 2021-01-13 RX ORDER — NORTRIPTYLINE HYDROCHLORIDE 10 MG/1
20 CAPSULE ORAL NIGHTLY
Refills: 0 | COMMUNITY
Start: 2021-01-13 | End: 2021-12-10

## 2021-01-13 RX ORDER — GABAPENTIN 300 MG/1
600 CAPSULE ORAL 2 TIMES DAILY
Qty: 360 CAPSULE | Refills: 3 | Status: SHIPPED | OUTPATIENT
Start: 2021-01-13 | End: 2021-12-10

## 2021-01-13 NOTE — PROGRESS NOTES
Stephane Marcus is a 63year old female. HPI:   Patient presents with:  Physical: Pain on right side from where mastectomy was done.        64 y/o F here for subsequent Medicare annual wellness exam; MRI abdomen in Feb 2020 showed a 1.1 cm left renal c RIGHT  11/01/2018   • OTHER SURGICAL HISTORY  11/01/2018    right breast mastecetomy   • RADIATION RIGHT      12/31/18-2/2019      Family History   Problem Relation Age of Onset   • Prostate Cancer Father 62        d. 58 metastatic   • Heart Attack Father magnesium oxide 400 MG Oral Tab Take 1 tablet (400 mg total) by mouth daily. 90 tablet 3   • Hydroxychloroquine Sulfate (PLAQUENIL) 200 MG Oral Tab Take 1 tablet by mouth daily.        • [facility] VIT D 5000 HIGH-POTENCY 5000 UNIT OR CAPS 1 CAPSULE DAILY radiation she thinks)      Fall/Risk Assessment     Do you have 3 or more medical conditions?: 1-Yes    Have you fallen in the last 12 months?: 1-Yes    Do you accidently lose urine?: 0-No    Do you have difficulty seeing?: 0-No    Do you have any difficul this or any previous visit. No flowsheet data found. Fecal Occult Blood Annually No results found for: FOB, OCCULTSTOOL No flowsheet data found.     Glaucoma Screening      Ophthalmology Visit Annually     Bone Density Screening      Dexascan Every two No flowsheet data found. Creat/alb ratio  Annually      LDL  Annually No results found for: LDL, LDLC No flowsheet data found. Dilated Eye exam  Annually No flowsheet data found. No flowsheet data found.     COPD      Spirometry Testing Annually No light touch and proprioception intact  Skin: no rash or ulcerations         ASSESSMENT/PLAN:   Physical exam  S/p unilateral oophorectomy/ hysterectomy in 1992, so PAP not indicated; Had colonoscopy in Dec 2014 with GI Dr Encinas (next colonoscopy due in 10 seen to have hiatal hernia     History of Depression  off fluoxetine 20 mg since Jan 2017; on no meds     Osteoporosis   DEXA in Sept 2018 shows T-score -1.5; Had DEXA scan in April 2017; was taking OTC calcium supplement; does not take bisphosphonates ora INTERNAL     1/13/2021  Isidro Alcantara MD

## 2021-01-28 LAB
ABSOLUTE BASOPHILS: 47 CELLS/UL (ref 0–200)
ABSOLUTE EOSINOPHILS: 292 CELLS/UL (ref 15–500)
ABSOLUTE LYMPHOCYTES: 2323 CELLS/UL (ref 850–3900)
ABSOLUTE MONOCYTES: 585 CELLS/UL (ref 200–950)
ABSOLUTE NEUTROPHILS: 4653 CELLS/UL (ref 1500–7800)
ALBUMIN/GLOBULIN RATIO: 1.5 (CALC) (ref 1–2.5)
ALBUMIN: 4.5 G/DL (ref 3.6–5.1)
ALKALINE PHOSPHATASE: 73 U/L (ref 37–153)
ALT: 16 U/L (ref 6–29)
APPEARANCE: CLEAR
AST: 19 U/L (ref 10–35)
BASOPHILS: 0.6 %
BILIRUBIN, TOTAL: 0.4 MG/DL (ref 0.2–1.2)
BILIRUBIN: NEGATIVE
BUN: 11 MG/DL (ref 7–25)
CALCIUM: 9.8 MG/DL (ref 8.6–10.4)
CARBON DIOXIDE: 30 MMOL/L (ref 20–32)
CHLORIDE: 101 MMOL/L (ref 98–110)
CHOL/HDLC RATIO: 3.4 (CALC)
CHOLESTEROL, TOTAL: 203 MG/DL
COLOR: YELLOW
CREATININE: 0.77 MG/DL (ref 0.5–0.99)
EGFR IF AFRICN AM: 95 ML/MIN/1.73M2
EGFR IF NONAFRICN AM: 82 ML/MIN/1.73M2
EOSINOPHILS: 3.7 %
GLOBULIN: 3.1 G/DL (CALC) (ref 1.9–3.7)
GLUCOSE: 109 MG/DL (ref 65–99)
GLUCOSE: NEGATIVE
HDL CHOLESTEROL: 60 MG/DL
HEMATOCRIT: 41.5 % (ref 35–45)
HEMOGLOBIN: 14.1 G/DL (ref 11.7–15.5)
KETONES: NEGATIVE
LDL-CHOLESTEROL: 121 MG/DL (CALC)
LEUKOCYTE ESTERASE: NEGATIVE
LYMPHOCYTES: 29.4 %
MCH: 30.2 PG (ref 27–33)
MCHC: 34 G/DL (ref 32–36)
MCV: 88.9 FL (ref 80–100)
MONOCYTES: 7.4 %
MPV: 9.8 FL (ref 7.5–12.5)
NEUTROPHILS: 58.9 %
NITRITE: NEGATIVE
NON-HDL CHOLESTEROL: 143 MG/DL (CALC)
PH: 7 (ref 5–8)
PLATELET COUNT: 367 THOUSAND/UL (ref 140–400)
POTASSIUM: 4.7 MMOL/L (ref 3.5–5.3)
PROTEIN, TOTAL: 7.6 G/DL (ref 6.1–8.1)
PROTEIN: NEGATIVE
RDW: 12.4 % (ref 11–15)
RED BLOOD CELL COUNT: 4.67 MILLION/UL (ref 3.8–5.1)
SODIUM: 141 MMOL/L (ref 135–146)
SPECIFIC GRAVITY: 1 (ref 1–1.03)
TRIGLYCERIDES: 109 MG/DL
TSH: 1.73 MIU/L (ref 0.4–4.5)
VITAMIN D, 25-OH, TOTAL: 51 NG/ML (ref 30–100)
WHITE BLOOD CELL COUNT: 7.9 THOUSAND/UL (ref 3.8–10.8)

## 2021-03-04 ENCOUNTER — HOSPITAL ENCOUNTER (OUTPATIENT)
Dept: MRI IMAGING | Facility: HOSPITAL | Age: 66
Discharge: HOME | End: 2021-03-04
Attending: UROLOGY
Payer: MEDICARE

## 2021-03-04 DIAGNOSIS — N28.89 LEFT RENAL MASS: ICD-10-CM

## 2021-03-04 PROCEDURE — A9575 INJ GADOTERATE MEGLUMI 0.1ML: HCPCS | Performed by: UROLOGY

## 2021-03-04 PROCEDURE — 74183 MRI ABD W/O CNTR FLWD CNTR: CPT | Performed by: UROLOGY

## 2021-03-17 ENCOUNTER — OFFICE VISIT (OUTPATIENT)
Dept: SURGERY | Facility: CLINIC | Age: 66
End: 2021-03-17
Payer: MEDICARE

## 2021-03-17 VITALS
BODY MASS INDEX: 35 KG/M2 | WEIGHT: 177 LBS | SYSTOLIC BLOOD PRESSURE: 140 MMHG | DIASTOLIC BLOOD PRESSURE: 89 MMHG | HEART RATE: 82 BPM

## 2021-03-17 DIAGNOSIS — N28.89 LEFT RENAL MASS: Primary | ICD-10-CM

## 2021-03-17 PROCEDURE — 3077F SYST BP >= 140 MM HG: CPT | Performed by: UROLOGY

## 2021-03-17 PROCEDURE — 3079F DIAST BP 80-89 MM HG: CPT | Performed by: UROLOGY

## 2021-03-17 PROCEDURE — 99213 OFFICE O/P EST LOW 20 MIN: CPT | Performed by: UROLOGY

## 2021-03-17 NOTE — PROGRESS NOTES
[facility]  Follow-Up Visit    HPI: Dulce Maria Michaud is a 63year old female presents for a follow up visit. Patient was last seen on 9/17/20. INTERVAL HISTORY: Here for follow-up of small left renal mass, on active surveillance.  Denies fl She is a retired . She does not do drugs.     FAMILY HISTORY: Patient has a strong family history of prostate cancer in her father, brother, and paternal uncle. She has a maternal uncle with history of bladder cancer.   Multiple cousins and cholecystectomy with stable mild dilatation of biliary tree. MRI Abdomen +/- IV Contrast (02/2020):  1.  T2 hypointense 1.1 cm left renal cortical lesion in the inferior pole demonstrates mild enhancement consistent with a small neoplasm.  A renal c

## 2021-04-02 NOTE — LETTER
[address] [place] [address]  Authorization for Invasive Procedures  1.  I hereby authorize Dr. Darron Casey , my physician and whomever may be designated as the doctor's assistant, to perform the following operation and/or procedure:  Marylene Eth performed for the purposes of advancing medicine, science, and/or education, provided my identity is not revealed. If the procedure has been videotaped, the physician/surgeon will obtain the original videotape.  The hospital will not be responsible for stor My signature below affirms that prior to the time of the procedure, I have explained to the patient and/or her legal representative, the risks and benefits involved in the proposed treatment and any reasonable alternative to the proposed treatment.  I have 02-Apr-2021 06:00

## 2021-04-03 ENCOUNTER — HOSPITAL ENCOUNTER (OUTPATIENT)
Age: 66
Discharge: HOME | End: 2021-04-03
Payer: MEDICARE

## 2021-04-03 VITALS
DIASTOLIC BLOOD PRESSURE: 81 MMHG | SYSTOLIC BLOOD PRESSURE: 159 MMHG | WEIGHT: 177 LBS | RESPIRATION RATE: 18 BRPM | BODY MASS INDEX: 34.75 KG/M2 | TEMPERATURE: 98 F | OXYGEN SATURATION: 97 % | HEART RATE: 92 BPM | HEIGHT: 60 IN

## 2021-04-03 DIAGNOSIS — N39.0 URINARY TRACT INFECTION WITH HEMATURIA, SITE UNSPECIFIED: Primary | ICD-10-CM

## 2021-04-03 DIAGNOSIS — N39.0 UTI (URINARY TRACT INFECTION): ICD-10-CM

## 2021-04-03 DIAGNOSIS — R31.9 URINARY TRACT INFECTION WITH HEMATURIA, SITE UNSPECIFIED: Primary | ICD-10-CM

## 2021-04-03 PROCEDURE — 81002 URINALYSIS NONAUTO W/O SCOPE: CPT | Performed by: NURSE PRACTITIONER

## 2021-04-03 PROCEDURE — 99213 OFFICE O/P EST LOW 20 MIN: CPT | Performed by: NURSE PRACTITIONER

## 2021-04-03 RX ORDER — NITROFURANTOIN 25; 75 MG/1; MG/1
100 CAPSULE ORAL 2 TIMES DAILY
Qty: 20 CAPSULE | Refills: 0 | Status: SHIPPED | OUTPATIENT
Start: 2021-04-03 | End: 2021-04-13

## 2021-04-03 NOTE — ED PROVIDER NOTES
Patient Seen in: [facility]      History   Patient presents with:  Urinary Symptoms    Stated Complaint: UTI    HPI/Subjective:   HPI    62 yo female arrives to the ic with co freq and dysuria, denies fevers, no cva tenderness    Objective: Negative. Allergic/Immunologic: Negative. Neurological: Negative. Hematological: Negative. Psychiatric/Behavioral: Negative. All other systems reviewed and are negative.       Positive for stated complaint: UTI  Other systems are as noted in Slightly cloudy (*)     Protein urine Trace (*)     Blood, Urine Large (*)     Leukocyte esterase urine Small (*)     All other components within normal limits   URINE CULTURE, ROUTINE          spo2 on ra is 97% which is normal   04/03/21  1359   BP: 159/8 (primary encounter diagnosis)  UTI (urinary tract infection)    Disposition:  Discharge  4/3/2021  2:09 pm    Follow-up:  Miguel Cool MD  [address] [id number] [phone]    Schedule an appointment as soon as possible for a visi

## 2021-04-14 ENCOUNTER — TELEPHONE (OUTPATIENT)
Dept: INTERNAL MEDICINE CLINIC | Facility: CLINIC | Age: 66
End: 2021-04-14

## 2021-04-14 DIAGNOSIS — N30.01 ACUTE CYSTITIS WITH HEMATURIA: Primary | ICD-10-CM

## 2021-04-14 NOTE — TELEPHONE ENCOUNTER
Getting over a UTI  Was seen in IC - finished antibiotic last night  This morning at 4:30 am started with bladder spasms/frequent trips to the bathroom to urinate  - noticed blood clots in the bottom of the toilet & also on toilet paper when she wiped  Rem

## 2021-04-14 NOTE — TELEPHONE ENCOUNTER
To Dr. Michael Castro----    Please see message below. Pt finished abx last night and symptoms have resumed along with hematuria. Pt was seen in UC on 4/3/21 and urine culture was completed (in lab results).  Pt originally prescribed Macrobid per APRN notes, but koko

## 2021-04-15 ENCOUNTER — OFFICE VISIT (OUTPATIENT)
Dept: INTERNAL MEDICINE CLINIC | Facility: CLINIC | Age: 66
End: 2021-04-15
Payer: MEDICARE

## 2021-04-15 VITALS
HEART RATE: 98 BPM | OXYGEN SATURATION: 96 % | BODY MASS INDEX: 35.57 KG/M2 | HEIGHT: 60 IN | DIASTOLIC BLOOD PRESSURE: 80 MMHG | SYSTOLIC BLOOD PRESSURE: 124 MMHG | TEMPERATURE: 98 F | WEIGHT: 181.19 LBS

## 2021-04-15 DIAGNOSIS — N30.01 ACUTE CYSTITIS WITH HEMATURIA: Primary | ICD-10-CM

## 2021-04-15 DIAGNOSIS — R51.9 CHRONIC NONINTRACTABLE HEADACHE, UNSPECIFIED HEADACHE TYPE: ICD-10-CM

## 2021-04-15 DIAGNOSIS — N28.89 LEFT RENAL MASS: ICD-10-CM

## 2021-04-15 DIAGNOSIS — G89.29 CHRONIC NONINTRACTABLE HEADACHE, UNSPECIFIED HEADACHE TYPE: ICD-10-CM

## 2021-04-15 PROCEDURE — 3079F DIAST BP 80-89 MM HG: CPT | Performed by: INTERNAL MEDICINE

## 2021-04-15 PROCEDURE — 3008F BODY MASS INDEX DOCD: CPT | Performed by: INTERNAL MEDICINE

## 2021-04-15 PROCEDURE — 99214 OFFICE O/P EST MOD 30 MIN: CPT | Performed by: INTERNAL MEDICINE

## 2021-04-15 PROCEDURE — 3074F SYST BP LT 130 MM HG: CPT | Performed by: INTERNAL MEDICINE

## 2021-04-15 RX ORDER — AMOXICILLIN 500 MG/1
500 TABLET, FILM COATED ORAL 3 TIMES DAILY
Qty: 30 TABLET | Refills: 0 | Status: SHIPPED | OUTPATIENT
Start: 2021-04-15 | End: 2021-05-03 | Stop reason: ALTCHOICE

## 2021-04-15 NOTE — PROGRESS NOTES
Elysia Villegas is a 61year old female. HPI:   Patient presents with:  UTI: Patient present c/o hematuria and bladder spasm yesterday with none today. Abx completed Tuesday AM. Sx: frequent urination, dysuria, lower abd discomfort - exhausted.  Pain 8 Mother 32        thyroid; lung @ 61 (smoker)   • Cancer Maternal Grandmother 39        cervical/uterine   • Cancer Maternal Aunt 76        pancreatic ca; fmr smoker; d.75   • Pancreatic Cancer Maternal Aunt 76   • Cancer Sister 64        thyroid ca; sx onl OR CAPS 1 CAPSULE DAILY         Allergies:    Lymphazurin [Isosul*    ANAPHYLAXIS  Elavil [Amitriptyli*    OTHER (SEE COMMENTS)    Comment:Altered mental state  Flexeril [Cyclobenz*    OTHER (SEE COMMENTS)    Comment:Altered mental state  Nabumetone in the inferior pole demonstrates mild enhancement consistent with a small neoplasm; pt seen by urologist Dr Mariangel Caballero and advised for surveillance imaging; repeat CT abdomen in Sept 2020 showed stable solid 1.4 cm left lower pole renal mass with MRI [facility] meds     Osteoporosis   DEXA in Sept 2018 shows T-score -1.5; Had DEXA scan in April 2017; was taking OTC calcium supplement; does not take bisphosphonates orally due to dyspepsia and hiatal hernia; undergoes Reclast IV annually in March at Dr Arie varner

## 2021-04-15 NOTE — TELEPHONE ENCOUNTER
Appt made with Dr. Lisa Baron for 1:15 today. Hematuria yesterday with bladder spasm, none today. Abx completed Tuesday AM.  Sx: frequent urination, dysuria, lower abd discomfort - exhausted. Denies fever/chills. Eating/drinking fine.   [facility]

## 2021-04-21 RX ORDER — SULFAMETHOXAZOLE AND TRIMETHOPRIM 800; 160 MG/1; MG/1
1 TABLET ORAL 2 TIMES DAILY
Qty: 20 TABLET | Refills: 0 | Status: SHIPPED | OUTPATIENT
Start: 2021-04-21 | End: 2021-05-03 | Stop reason: ALTCHOICE

## 2021-04-21 NOTE — TELEPHONE ENCOUNTER
Patient is still experiencing pain and frequent urination  On Friday 4/16 she had blood in her urine    She doesn't feel the Amoxicillin is helping  Please call patient [phone]

## 2021-04-22 NOTE — TELEPHONE ENCOUNTER
IMP- UTI    Refractory to Cipro, amoxicilllin    Rec- repeat UA C&S    Start Bactrim DS one tab po BID #20    Pt called; ERx sent

## 2021-04-26 ENCOUNTER — TELEPHONE (OUTPATIENT)
Dept: INTERNAL MEDICINE CLINIC | Facility: CLINIC | Age: 66
End: 2021-04-26

## 2021-04-26 RX ORDER — NITROFURANTOIN 25; 75 MG/1; MG/1
100 CAPSULE ORAL 2 TIMES DAILY
Qty: 28 CAPSULE | Refills: 0 | Status: SHIPPED | OUTPATIENT
Start: 2021-04-26 | End: 2021-12-10 | Stop reason: ALTCHOICE

## 2021-04-26 NOTE — TELEPHONE ENCOUNTER
Imp- ESBL UTI    Sensitive to nitrofurantoin; resistant to all other Abx    Rec- nitrofurantoin 100 mg po BID #28; if does not respond, will need to refer to ID for IV Abx; (stop Bactrim)    Pt called; ERx sent

## 2021-05-03 ENCOUNTER — OFFICE VISIT (OUTPATIENT)
Dept: HEMATOLOGY/ONCOLOGY | Facility: HOSPITAL | Age: 66
End: 2021-05-03
Attending: INTERNAL MEDICINE
Payer: MEDICARE

## 2021-05-03 VITALS
TEMPERATURE: 97 F | RESPIRATION RATE: 18 BRPM | HEART RATE: 80 BPM | HEIGHT: 60 IN | DIASTOLIC BLOOD PRESSURE: 83 MMHG | OXYGEN SATURATION: 97 % | BODY MASS INDEX: 35.53 KG/M2 | WEIGHT: 181 LBS | SYSTOLIC BLOOD PRESSURE: 144 MMHG

## 2021-05-03 DIAGNOSIS — Z12.31 ENCOUNTER FOR SCREENING MAMMOGRAM FOR BREAST CANCER: ICD-10-CM

## 2021-05-03 DIAGNOSIS — Z51.81 ENCOUNTER FOR MONITORING ADJUVANT HORMONAL THERAPY: ICD-10-CM

## 2021-05-03 DIAGNOSIS — Z17.0 MALIGNANT NEOPLASM OF RIGHT BREAST IN FEMALE, ESTROGEN RECEPTOR POSITIVE, UNSPECIFIED SITE OF BREAST (HCC): Primary | ICD-10-CM

## 2021-05-03 DIAGNOSIS — Z92.3 HISTORY OF RADIATION THERAPY: ICD-10-CM

## 2021-05-03 DIAGNOSIS — C50.911 MALIGNANT NEOPLASM OF RIGHT BREAST IN FEMALE, ESTROGEN RECEPTOR POSITIVE, UNSPECIFIED SITE OF BREAST (HCC): Primary | ICD-10-CM

## 2021-05-03 DIAGNOSIS — N28.9 RENAL LESION: ICD-10-CM

## 2021-05-03 DIAGNOSIS — Z79.899 ENCOUNTER FOR MONITORING ADJUVANT HORMONAL THERAPY: ICD-10-CM

## 2021-05-03 PROCEDURE — 99214 OFFICE O/P EST MOD 30 MIN: CPT | Performed by: INTERNAL MEDICINE

## 2021-05-03 NOTE — PROGRESS NOTES
Cancer Center Progress Note    Patient Name: Zeus Jackson   YOB: 1955   Medical Record Number: [id number]   Attending Physician: Alban Arevalo M.D.      Chief Complaint:  Breast cancer    History of Present Illness:  Cancer history:  61 ye 11/01/2018    right breast mastecetomy   • RADIATION RIGHT      12/31/18-2/2019       Family History:  Family History   Problem Relation Age of Onset   • Prostate Cancer Father 62        d. 58 metastatic   • Heart Attack Father         MI   • Cancer Mother Asked        Exercise: Yes          walking        Bike Helmet: Not Asked        Seat Belt: Not Asked        Self-Exams: Not Asked    Social History Narrative      The patient does not use an assistive device. .        The patient does not live in a home wi Oral Tab, TAKE 1 TABLET (2.5 MG TOTAL) BY MOUTH DAILY. , Disp: 90 tablet, Rfl: 3  •  magnesium oxide 400 MG Oral Tab, Take 1 tablet (400 mg total) by mouth daily. , Disp: 90 tablet, Rfl: 3  •  Hydroxychloroquine Sulfate (PLAQUENIL) 200 MG Oral Tab, Take 1 ta 01/27/2021    TP 7.6 01/27/2021    ALB 4.5 01/27/2021    GLOBULT 3.1 01/27/2021    ALBGLOBRAT 1.5 01/27/2021     01/27/2021    K 4.7 01/27/2021     01/27/2021    CO2 30 01/27/2021       Radiology:  none    Cancer Staging  Stage I breast cancer,

## 2021-06-02 ENCOUNTER — TELEPHONE (OUTPATIENT)
Dept: INTERNAL MEDICINE CLINIC | Facility: CLINIC | Age: 66
End: 2021-06-02

## 2021-06-02 DIAGNOSIS — C50.911 PRIMARY CANCER OF RIGHT BREAST WITH STAGE 2 NODAL METASTASIS PER AMERICAN JOINT COMMITTEE ON CANCER 7TH EDITION (N2) (HCC): Primary | ICD-10-CM

## 2021-06-02 DIAGNOSIS — C77.9 PRIMARY CANCER OF RIGHT BREAST WITH STAGE 2 NODAL METASTASIS PER AMERICAN JOINT COMMITTEE ON CANCER 7TH EDITION (N2) (HCC): Primary | ICD-10-CM

## 2021-06-02 NOTE — TELEPHONE ENCOUNTER
Called patient who will call with information of company - then order for right breast prosthesis can be mailed to her once written by DR. VALDEZ

## 2021-06-02 NOTE — TELEPHONE ENCOUNTER
Patient calling for order for a Right Breast prosthesis. Patient has found that insurance will pay. Please call patient back.   Best call back number [phone]

## 2021-06-02 NOTE — TELEPHONE ENCOUNTER
To DR. VALDEZ - patient needs written order for right breast Prosthesis [facility] [place])  Patient wants order mailed to her thanks

## 2021-06-18 ENCOUNTER — TELEPHONE (OUTPATIENT)
Dept: INTERNAL MEDICINE CLINIC | Facility: CLINIC | Age: 66
End: 2021-06-18

## 2021-06-18 NOTE — TELEPHONE ENCOUNTER
Pt. Has been having chapped lips for the past 3 months and nothing she has been doing is working pt. Also reports sores in the corner of her mouth they started with her last UTI please advise ph.  # [phone]  Routed to clinical

## 2021-06-18 NOTE — TELEPHONE ENCOUNTER
Spoke with patient who reports she has been having very chapped lips. She has tried 3 different lip balms as well as an olive oil salve she got in [place]. These help soften the skin up a little but the chapped skin always returns.  The corners or her mo

## 2021-06-18 NOTE — TELEPHONE ENCOUNTER
Imp- angular chelitis    rec- miconazole oint 2% ATAA every day    Mupirocin 2% oint ATAA TID    ERx sent; pt called

## 2021-06-21 RX ORDER — LETROZOLE 2.5 MG/1
2.5 TABLET, FILM COATED ORAL DAILY
Qty: 90 TABLET | Refills: 3 | Status: SHIPPED | OUTPATIENT
Start: 2021-06-21 | End: 2022-04-11

## 2021-09-03 ENCOUNTER — OFFICE VISIT (OUTPATIENT)
Dept: HEMATOLOGY/ONCOLOGY | Facility: HOSPITAL | Age: 66
End: 2021-09-03
Attending: INTERNAL MEDICINE
Payer: COMMERCIAL

## 2021-09-03 VITALS
SYSTOLIC BLOOD PRESSURE: 138 MMHG | HEART RATE: 74 BPM | DIASTOLIC BLOOD PRESSURE: 74 MMHG | TEMPERATURE: 98 F | OXYGEN SATURATION: 100 % | BODY MASS INDEX: 34 KG/M2 | WEIGHT: 172.81 LBS | RESPIRATION RATE: 18 BRPM

## 2021-09-03 DIAGNOSIS — Z79.899 ENCOUNTER FOR MONITORING ADJUVANT HORMONAL THERAPY: ICD-10-CM

## 2021-09-03 DIAGNOSIS — Z17.0 MALIGNANT NEOPLASM OF RIGHT BREAST IN FEMALE, ESTROGEN RECEPTOR POSITIVE, UNSPECIFIED SITE OF BREAST (HCC): Primary | ICD-10-CM

## 2021-09-03 DIAGNOSIS — Z92.3 HISTORY OF RADIATION THERAPY: ICD-10-CM

## 2021-09-03 DIAGNOSIS — C50.911 MALIGNANT NEOPLASM OF RIGHT BREAST IN FEMALE, ESTROGEN RECEPTOR POSITIVE, UNSPECIFIED SITE OF BREAST (HCC): Primary | ICD-10-CM

## 2021-09-03 DIAGNOSIS — Z51.81 ENCOUNTER FOR MONITORING ADJUVANT HORMONAL THERAPY: ICD-10-CM

## 2021-09-03 PROCEDURE — 99214 OFFICE O/P EST MOD 30 MIN: CPT | Performed by: INTERNAL MEDICINE

## 2021-09-03 NOTE — PROGRESS NOTES
Cancer Center Progress Note    Patient Name: Oscar Membreno   YOB: 1955   Medical Record Number: [id number]   Attending Physician: Jose Luis Conley M.D.      Chief Complaint:  Breast cancer    History of Present Illness:  Cancer history:  61 ye 11/01/2018    right breast mastecetomy   • RADIATION RIGHT      12/31/18-2/2019       Family History:  Family History   Problem Relation Age of Onset   • Prostate Cancer Father 62        d. 58 metastatic   • Heart Attack Father         MI   • Cancer Mother Asked        Exercise: Yes          walking        Bike Helmet: Not Asked        Seat Belt: Not Asked        Self-Exams: Not Asked    Social History Narrative      The patient does not use an assistive device. .        The patient does not live in a home wi AM and 900mg in PM ), Disp: 360 capsule, Rfl: 3  •  Pantoprazole Sodium 40 MG Oral Tab EC, Take 1 tablet (40 mg total) by mouth every morning before breakfast., Disp: 90 tablet, Rfl: 3  •  FLUTICASONE PROPIONATE 50 MCG/ACT Nasal Suspension, USE 2 SPRAYS IN 0.77 01/27/2021    ANIONGAP 6 11/02/2018    GFRNAA 82 01/27/2021    GFRAA 95 01/27/2021    CA 9.8 01/27/2021    OSMOCALC 286 11/02/2018    ALKPHO 73 01/27/2021    AST 19 01/27/2021    ALT 16 01/27/2021    BILT 0.4 01/27/2021    TP 7.6 01/27/2021    ALB 4.5

## 2021-09-13 ENCOUNTER — HOSPITAL ENCOUNTER (OUTPATIENT)
Dept: MAMMOGRAPHY | Age: 66
Discharge: HOME | End: 2021-09-13
Attending: INTERNAL MEDICINE
Payer: COMMERCIAL

## 2021-09-13 DIAGNOSIS — Z12.31 ENCOUNTER FOR SCREENING MAMMOGRAM FOR BREAST CANCER: ICD-10-CM

## 2021-09-13 PROCEDURE — 77063 BREAST TOMOSYNTHESIS BI: CPT | Performed by: INTERNAL MEDICINE

## 2021-09-13 PROCEDURE — 77067 SCR MAMMO BI INCL CAD: CPT | Performed by: INTERNAL MEDICINE

## 2021-10-04 ENCOUNTER — TELEPHONE (OUTPATIENT)
Dept: HEMATOLOGY/ONCOLOGY | Facility: HOSPITAL | Age: 66
End: 2021-10-04

## 2021-10-05 ENCOUNTER — TELEPHONE (OUTPATIENT)
Dept: HEMATOLOGY/ONCOLOGY | Facility: HOSPITAL | Age: 66
End: 2021-10-05

## 2021-10-05 NOTE — TELEPHONE ENCOUNTER
[facility] DIVYA ANNA TO REACH OUT TO PCP OR DR CROUCH FOR PT ORDER PER DEN.  [address] [facility]

## 2021-10-11 ENCOUNTER — TELEPHONE (OUTPATIENT)
Dept: INTERNAL MEDICINE CLINIC | Facility: CLINIC | Age: 66
End: 2021-10-11

## 2021-10-11 DIAGNOSIS — C50.911 MALIGNANT NEOPLASM OF RIGHT BREAST IN FEMALE, ESTROGEN RECEPTOR POSITIVE, UNSPECIFIED SITE OF BREAST (HCC): Primary | ICD-10-CM

## 2021-10-11 DIAGNOSIS — I89.0 LYMPHEDEMA: ICD-10-CM

## 2021-10-11 DIAGNOSIS — Z17.0 MALIGNANT NEOPLASM OF RIGHT BREAST IN FEMALE, ESTROGEN RECEPTOR POSITIVE, UNSPECIFIED SITE OF BREAST (HCC): Primary | ICD-10-CM

## 2021-10-11 NOTE — TELEPHONE ENCOUNTER
To Dr Peterson Heads, please advise if okay to order pended referral? Previously ordered by Dr Malachi Aguirre on 11/3/20.

## 2021-10-21 RX ORDER — FLUTICASONE PROPIONATE 50 MCG
2 SPRAY, SUSPENSION (ML) NASAL DAILY
Qty: 48 G | Refills: 1 | Status: SHIPPED | OUTPATIENT
Start: 2021-10-21

## 2021-10-25 ENCOUNTER — TELEPHONE (OUTPATIENT)
Dept: PHYSICAL MEDICINE AND REHAB | Facility: CLINIC | Age: 66
End: 2021-10-25

## 2021-10-25 NOTE — TELEPHONE ENCOUNTER
Patient left VM stating she currently has an appointment scheduled with Costa Thompson for mid December, but would like to switch this with her sister-Jeanine Cooper who has a sooner appointment scheduled.     Message forwarded to front office to reach out to patient

## 2021-10-27 ENCOUNTER — OFFICE VISIT (OUTPATIENT)
Dept: PHYSICAL MEDICINE AND REHAB | Facility: CLINIC | Age: 66
End: 2021-10-27
Payer: MEDICARE

## 2021-10-27 ENCOUNTER — TELEPHONE (OUTPATIENT)
Dept: PHYSICAL MEDICINE AND REHAB | Facility: CLINIC | Age: 66
End: 2021-10-27

## 2021-10-27 DIAGNOSIS — M48.061 LUMBAR FORAMINAL STENOSIS: ICD-10-CM

## 2021-10-27 DIAGNOSIS — M51.26 LUMBAR HERNIATED DISC: ICD-10-CM

## 2021-10-27 DIAGNOSIS — M51.9 LUMBAR DISC DISEASE: ICD-10-CM

## 2021-10-27 DIAGNOSIS — M65.331 TRIGGER MIDDLE FINGER OF RIGHT HAND: Primary | ICD-10-CM

## 2021-10-27 DIAGNOSIS — M43.16 SPONDYLOLISTHESIS OF LUMBAR REGION: ICD-10-CM

## 2021-10-27 DIAGNOSIS — M54.16 LUMBAR RADICULOPATHY: ICD-10-CM

## 2021-10-27 PROCEDURE — 99214 OFFICE O/P EST MOD 30 MIN: CPT | Performed by: PHYSICAL MEDICINE & REHABILITATION

## 2021-10-27 PROCEDURE — 76942 ECHO GUIDE FOR BIOPSY: CPT | Performed by: PHYSICAL MEDICINE & REHABILITATION

## 2021-10-27 PROCEDURE — 20550 NJX 1 TENDON SHEATH/LIGAMENT: CPT | Performed by: PHYSICAL MEDICINE & REHABILITATION

## 2021-10-27 RX ORDER — LIDOCAINE HYDROCHLORIDE 10 MG/ML
0.5 INJECTION, SOLUTION INFILTRATION; PERINEURAL ONCE
Status: COMPLETED | OUTPATIENT
Start: 2021-10-27 | End: 2021-10-27

## 2021-10-27 RX ORDER — TRIAMCINOLONE ACETONIDE 40 MG/ML
20 INJECTION, SUSPENSION INTRA-ARTICULAR; INTRAMUSCULAR ONCE
Status: COMPLETED | OUTPATIENT
Start: 2021-10-27 | End: 2021-10-27

## 2021-10-27 NOTE — TELEPHONE ENCOUNTER
Initiated authorization for Right 3rd finger flexor tendon sheath injection at the site of the A1 pulley under ultrasound guidance CPT 05199++59578 with Ret G at [facility] reference #[id number] and transferred to Sonia Stanley in [facility] reference #[id number]    Statu

## 2021-10-29 NOTE — PROGRESS NOTES
Cervical Pain H & P    Chief Complaint:  Patient presents with: Injection: Trigger point injection middle finger right hand    Nursing note reviewed and verified. Patient was last seen on 1/27/2020. On 2/4/2020, I did left L5 and right S1 TFESI's. Cancer Brother 52        surgery   • Cancer Maternal Uncle 36        bladder ca, non-smoker   • Cancer Maternal Grandfather 61        lymphoma   • Cancer Paternal Grandfather         brain ca; dx on autopsy; d.55   • Other (drug addict) Son    • Cancer Mat file  Transportation Needs:       Lack of Transportation (Medical): Not on file      Lack of Transportation (Non-Medical):  Not on file  Physical Activity:       Days of Exercise per Week: Not on file      Minutes of Exercise per Session: Not on file  Edwin Strength: All Upper Extremity strength measurements 5/5   Reflexes: 2+ In the bilateral upper extremities.    Dia's sign Right: Negative   Dia's sigh Left: Negative     right Hand:  There is palpable nodule over the right 3rd MCP joint in the area o

## 2021-11-23 ENCOUNTER — TELEPHONE (OUTPATIENT)
Dept: INTERNAL MEDICINE CLINIC | Facility: CLINIC | Age: 66
End: 2021-11-23

## 2021-11-23 DIAGNOSIS — C50.911 MALIGNANT NEOPLASM OF RIGHT BREAST IN FEMALE, ESTROGEN RECEPTOR POSITIVE, UNSPECIFIED SITE OF BREAST (HCC): ICD-10-CM

## 2021-11-23 DIAGNOSIS — Z17.0 MALIGNANT NEOPLASM OF RIGHT BREAST IN FEMALE, ESTROGEN RECEPTOR POSITIVE, UNSPECIFIED SITE OF BREAST (HCC): ICD-10-CM

## 2021-11-23 DIAGNOSIS — E78.00 HYPERCHOLESTEREMIA: ICD-10-CM

## 2021-11-23 DIAGNOSIS — E55.9 VITAMIN D DEFICIENCY: ICD-10-CM

## 2021-11-23 DIAGNOSIS — Z00.00 ANNUAL PHYSICAL EXAM: Primary | ICD-10-CM

## 2021-11-23 DIAGNOSIS — M32.9 SYSTEMIC LUPUS ERYTHEMATOSUS, UNSPECIFIED SLE TYPE, UNSPECIFIED ORGAN INVOLVEMENT STATUS (HCC): ICD-10-CM

## 2021-11-23 NOTE — TELEPHONE ENCOUNTER
Pt scheduled an appt with Dr Zaragoza Oar on   Dec 10  Requests orders for labs to be done prior  [phone]

## 2021-12-01 ENCOUNTER — LAB ENCOUNTER (OUTPATIENT)
Dept: LAB | Age: 66
End: 2021-12-01
Attending: INTERNAL MEDICINE
Payer: COMMERCIAL

## 2021-12-01 DIAGNOSIS — M32.9 SYSTEMIC LUPUS ERYTHEMATOSUS, UNSPECIFIED SLE TYPE, UNSPECIFIED ORGAN INVOLVEMENT STATUS (HCC): ICD-10-CM

## 2021-12-01 DIAGNOSIS — Z17.0 MALIGNANT NEOPLASM OF RIGHT BREAST IN FEMALE, ESTROGEN RECEPTOR POSITIVE, UNSPECIFIED SITE OF BREAST (HCC): ICD-10-CM

## 2021-12-01 DIAGNOSIS — E78.00 HYPERCHOLESTEREMIA: ICD-10-CM

## 2021-12-01 DIAGNOSIS — E55.9 VITAMIN D DEFICIENCY: ICD-10-CM

## 2021-12-01 DIAGNOSIS — C50.911 MALIGNANT NEOPLASM OF RIGHT BREAST IN FEMALE, ESTROGEN RECEPTOR POSITIVE, UNSPECIFIED SITE OF BREAST (HCC): ICD-10-CM

## 2021-12-01 PROCEDURE — 84443 ASSAY THYROID STIM HORMONE: CPT

## 2021-12-01 PROCEDURE — 85025 COMPLETE CBC W/AUTO DIFF WBC: CPT

## 2021-12-01 PROCEDURE — 80061 LIPID PANEL: CPT

## 2021-12-01 PROCEDURE — 36415 COLL VENOUS BLD VENIPUNCTURE: CPT

## 2021-12-01 PROCEDURE — 82306 VITAMIN D 25 HYDROXY: CPT

## 2021-12-01 PROCEDURE — 80053 COMPREHEN METABOLIC PANEL: CPT

## 2021-12-06 ENCOUNTER — TELEPHONE (OUTPATIENT)
Dept: PHYSICAL THERAPY | Facility: HOSPITAL | Age: 66
End: 2021-12-06

## 2021-12-10 ENCOUNTER — TELEPHONE (OUTPATIENT)
Dept: PHYSICAL THERAPY | Facility: HOSPITAL | Age: 66
End: 2021-12-10

## 2021-12-10 ENCOUNTER — OFFICE VISIT (OUTPATIENT)
Dept: INTERNAL MEDICINE CLINIC | Facility: CLINIC | Age: 66
End: 2021-12-10
Payer: MEDICARE

## 2021-12-10 VITALS
HEIGHT: 60 IN | TEMPERATURE: 97 F | SYSTOLIC BLOOD PRESSURE: 130 MMHG | OXYGEN SATURATION: 97 % | BODY MASS INDEX: 33.96 KG/M2 | WEIGHT: 173 LBS | HEART RATE: 85 BPM | DIASTOLIC BLOOD PRESSURE: 78 MMHG

## 2021-12-10 DIAGNOSIS — Z00.00 PHYSICAL EXAM, ANNUAL: Primary | ICD-10-CM

## 2021-12-10 DIAGNOSIS — N28.89 LEFT RENAL MASS: ICD-10-CM

## 2021-12-10 DIAGNOSIS — E66.9 OBESITY (BMI 30.0-34.9): ICD-10-CM

## 2021-12-10 DIAGNOSIS — M47.816 LUMBAR SPONDYLOSIS: ICD-10-CM

## 2021-12-10 DIAGNOSIS — Z17.0 MALIGNANT NEOPLASM OF RIGHT BREAST IN FEMALE, ESTROGEN RECEPTOR POSITIVE, UNSPECIFIED SITE OF BREAST (HCC): ICD-10-CM

## 2021-12-10 DIAGNOSIS — K64.8 INTERNAL HEMORRHOID: ICD-10-CM

## 2021-12-10 DIAGNOSIS — M65.331 TRIGGER MIDDLE FINGER OF RIGHT HAND: ICD-10-CM

## 2021-12-10 DIAGNOSIS — Z87.440 HISTORY OF UTI: ICD-10-CM

## 2021-12-10 DIAGNOSIS — M32.9 SYSTEMIC LUPUS ERYTHEMATOSUS, UNSPECIFIED SLE TYPE, UNSPECIFIED ORGAN INVOLVEMENT STATUS (HCC): ICD-10-CM

## 2021-12-10 DIAGNOSIS — M81.8 OTHER OSTEOPOROSIS, UNSPECIFIED PATHOLOGICAL FRACTURE PRESENCE: ICD-10-CM

## 2021-12-10 DIAGNOSIS — Z86.59 HISTORY OF DEPRESSION: ICD-10-CM

## 2021-12-10 DIAGNOSIS — R51.9 CHRONIC NONINTRACTABLE HEADACHE, UNSPECIFIED HEADACHE TYPE: ICD-10-CM

## 2021-12-10 DIAGNOSIS — K21.9 GASTROESOPHAGEAL REFLUX DISEASE, UNSPECIFIED WHETHER ESOPHAGITIS PRESENT: ICD-10-CM

## 2021-12-10 DIAGNOSIS — G89.29 CHRONIC NONINTRACTABLE HEADACHE, UNSPECIFIED HEADACHE TYPE: ICD-10-CM

## 2021-12-10 DIAGNOSIS — E78.00 HYPERCHOLESTEREMIA: ICD-10-CM

## 2021-12-10 DIAGNOSIS — C50.911 MALIGNANT NEOPLASM OF RIGHT BREAST IN FEMALE, ESTROGEN RECEPTOR POSITIVE, UNSPECIFIED SITE OF BREAST (HCC): ICD-10-CM

## 2021-12-10 DIAGNOSIS — E55.9 VITAMIN D DEFICIENCY: ICD-10-CM

## 2021-12-10 DIAGNOSIS — I89.0 LYMPHEDEMA OF RIGHT ARM: ICD-10-CM

## 2021-12-10 DIAGNOSIS — G62.9 PERIPHERAL POLYNEUROPATHY: ICD-10-CM

## 2021-12-10 PROCEDURE — 3075F SYST BP GE 130 - 139MM HG: CPT | Performed by: INTERNAL MEDICINE

## 2021-12-10 PROCEDURE — 90686 IIV4 VACC NO PRSV 0.5 ML IM: CPT | Performed by: INTERNAL MEDICINE

## 2021-12-10 PROCEDURE — 99396 PREV VISIT EST AGE 40-64: CPT | Performed by: INTERNAL MEDICINE

## 2021-12-10 PROCEDURE — G0008 ADMIN INFLUENZA VIRUS VAC: HCPCS | Performed by: INTERNAL MEDICINE

## 2021-12-10 PROCEDURE — G0439 PPPS, SUBSEQ VISIT: HCPCS | Performed by: INTERNAL MEDICINE

## 2021-12-10 PROCEDURE — 3008F BODY MASS INDEX DOCD: CPT | Performed by: INTERNAL MEDICINE

## 2021-12-10 PROCEDURE — 96160 PT-FOCUSED HLTH RISK ASSMT: CPT | Performed by: INTERNAL MEDICINE

## 2021-12-10 PROCEDURE — 3078F DIAST BP <80 MM HG: CPT | Performed by: INTERNAL MEDICINE

## 2021-12-10 RX ORDER — PANTOPRAZOLE SODIUM 40 MG/1
40 TABLET, DELAYED RELEASE ORAL
Qty: 90 TABLET | Refills: 3 | Status: SHIPPED | OUTPATIENT
Start: 2021-12-10

## 2021-12-10 RX ORDER — IBUPROFEN 800 MG/1
800 TABLET ORAL EVERY 6 HOURS PRN
COMMUNITY
Start: 2021-10-05

## 2021-12-10 RX ORDER — NORTRIPTYLINE HYDROCHLORIDE 10 MG/1
20 CAPSULE ORAL NIGHTLY
Qty: 180 CAPSULE | Refills: 3 | Status: SHIPPED | OUTPATIENT
Start: 2021-12-10

## 2021-12-10 RX ORDER — GABAPENTIN 300 MG/1
CAPSULE ORAL
Qty: 450 CAPSULE | Refills: 3 | Status: SHIPPED | OUTPATIENT
Start: 2021-12-10

## 2021-12-10 RX ORDER — AMOXICILLIN 500 MG/1
CAPSULE ORAL
COMMUNITY
Start: 2021-10-05 | End: 2021-12-10 | Stop reason: ALTCHOICE

## 2021-12-10 NOTE — PROGRESS NOTES
Saint Council is a 61year old female. HPI:   Patient presents with:  Physical: Still having soreness on right side of rights, seeing PT. Had Booster. Flu shot today. She has blurred vision with distance after reading for a long period of time. surgery   • Cancer Maternal Uncle 40        bladder ca, non-smoker   • Cancer Maternal Grandfather 61        lymphoma   • Cancer Paternal Grandfather         brain ca; dx on autopsy; d.55   • Other (drug addict) Son    • Cancer Maternal Aunt 58        lung mental state  Nabumetone                  Comment:Other reaction(s):  Anaphylaxis  Relafen                 ANAPHYLAXIS  Cephalexin              NAUSEA ONLY        General Health     In the past six months, have you lost more than 10 pounds without trying?: No     Hearing Assessment (Required for AWV/SWV)      Hearing Screening    Time taken: 12/10/2021  9:49 AM  Entry User: [place] RN  Screening Method: Tuning Fork  Tuning Fork Result: Pass         Visual Acuity                   Cognitive Assessme done Update Health Maintenance if applicable    Chlamydia  Annually if high risk No results found for: CHLAMYDIA No flowsheet data found.     Screening Mammogram      Mammogram  Annually No recommendations at this time Update Health Maintenance if applicabl Negative for ear drainage, hearing loss and nasal drainage  Eyes:  Negative for eye discharge and vision loss  Cardiovascular:  Negative for chest pain; negative palpitations  Respiratory:  Negative for cough, dyspnea and wheezing  Endocrine:  Negative for abdomen in Feb 2020 showed a 1.1 cm left renal cortical lesion in the inferior pole demonstrates mild enhancement consistent with a small neoplasm; pt seen by urologist Dr Ros Aden and advised for surveillance imaging; repeat CT abdomen in Sept 2020 showed neuropathy  on gabapentin 600 mg po BID; Rx per neurologist Dr Wilda Carmichael, though pt no longer sees  -consider addition of Cymbalta if sxs refractory     GERD  on pantoprazole 40 mg op qD as heartburn has recurred; had EGD in Dec 2014 by Dr Melchor Jj; also seen review of available labs and radiology reports, and completing documentation.             Orders This Visit:  Orders Placed This Encounter      Flulaval 6 months and older 0.5 ml PFS [64742]      Meds This Visit:  Requested Prescriptions      No prescripti

## 2021-12-13 ENCOUNTER — OFFICE VISIT (OUTPATIENT)
Dept: PHYSICAL THERAPY | Facility: HOSPITAL | Age: 66
End: 2021-12-13
Attending: INTERNAL MEDICINE
Payer: MEDICARE

## 2021-12-13 DIAGNOSIS — C50.911 MALIGNANT NEOPLASM OF RIGHT BREAST IN FEMALE, ESTROGEN RECEPTOR POSITIVE, UNSPECIFIED SITE OF BREAST (HCC): ICD-10-CM

## 2021-12-13 DIAGNOSIS — Z17.0 MALIGNANT NEOPLASM OF RIGHT BREAST IN FEMALE, ESTROGEN RECEPTOR POSITIVE, UNSPECIFIED SITE OF BREAST (HCC): ICD-10-CM

## 2021-12-13 DIAGNOSIS — I89.0 LYMPHEDEMA: ICD-10-CM

## 2021-12-13 PROCEDURE — 97161 PT EVAL LOW COMPLEX 20 MIN: CPT | Performed by: PHYSICAL THERAPIST

## 2021-12-13 PROCEDURE — 97140 MANUAL THERAPY 1/> REGIONS: CPT | Performed by: PHYSICAL THERAPIST

## 2021-12-13 NOTE — PROGRESS NOTES
UE LYMPHEDEMA EVALUATION:     Referring Physician: Dr. Erika Lopez  Diagnosis: Malignant neoplasm of right breast in female, estrogen receptor positive, unspecified site of [facility] (C50.911,Z17.0)  Lymphedema (I89.0)      Date of onset: June 2020 Dur Sterling latissimus    Reason for lymphedema: Secondary Lymphedema, appears to be due to breast cancer   Stage of lymphedema: 2  Phase of treatment: Phase 1: Reduction Phase  Has patient been treated for lymphedema before, if yes, when: 2018  Does the patient have 29.1 28.1   MEASUREMENT E 28.8 28 28 27   MEASUREMENT F 33.1 31.3 31.1 29.5   MEASUREMENT G 35.3 33.3 33.7 32.6   MEASUREMENT H 37.2 36.8 38.3 38.1   MEASUREMENT I 37.5 37.8 40 39    TOTAL VOLUME  5391.33143 6451.38600 2915.90333 5069.98341   DATE MEASURED Treatment: 40 min     Total Treatment Time: 90 min     Based on clinical rationale and outcome measures, this evaluation is   unstable (high eval, 3 or more personal factors and/or comorbidities that impact plan of care, 45 min eval, 4 or more tests/measur therapist: Zoe Huerta, PT  [de-identified] certification required: Yes  I certify the need for these services furnished under this plan of treatment and while under my care.     X___________________________Alessio Ngo MD________________________ Date____

## 2021-12-21 ENCOUNTER — OFFICE VISIT (OUTPATIENT)
Dept: PHYSICAL THERAPY | Facility: HOSPITAL | Age: 66
End: 2021-12-21
Attending: INTERNAL MEDICINE
Payer: MEDICARE

## 2021-12-21 PROCEDURE — 97140 MANUAL THERAPY 1/> REGIONS: CPT | Performed by: PHYSICAL THERAPIST

## 2021-12-21 NOTE — PROGRESS NOTES
Referring Physician: Dr. Anup Patel  Diagnosis: Malignant neoplasm of right breast in female, estrogen receptor positive, unspecified site of breast [facility]) (C50.911,Z17.0)  Lymphedema (I89.0)      Date of onset: June 2020 Evaluation Date: 12/13/2021 abduction   STARTING POINT 15cm from 3rd cuticle 15cm from 3rd cuticle 15cm from 3rd cuticle 15cm from 3rd cuticle   RIGHT HAND VOLUME - - 275ml 275ml   LEFT HAND VOLUME - - 275ml 275ml   MEASUREMENT A 15.8 15.9 16.1 15.6   MEASUREMENT B 21.6 21.1 21.8 20. sequence, L axilla, A-A (ant and post), R inguinal, R A-I (supine and sidely) R axilla, R UE. Significant time spent on trunk      STM to R lat    Compression:  - pt's current compression sleeve is Juzo soft size 4.  Although it appears to fit well, it is n planned with patient involvement):       To be met in 10 visits:  1) Decrease swelling to be less than 3 % greater than unaffected arm to decrease pain and risk for infection  2) decrease trunk swelling by 2 cm to decrease pain and risk for infection  3) Pt

## 2021-12-28 ENCOUNTER — OFFICE VISIT (OUTPATIENT)
Dept: PHYSICAL THERAPY | Facility: HOSPITAL | Age: 66
End: 2021-12-28
Attending: INTERNAL MEDICINE
Payer: MEDICARE

## 2021-12-28 DIAGNOSIS — I89.0 LYMPHEDEMA: Primary | ICD-10-CM

## 2021-12-28 PROCEDURE — 97140 MANUAL THERAPY 1/> REGIONS: CPT | Performed by: PHYSICAL THERAPIST

## 2021-12-28 NOTE — PROGRESS NOTES
Referring Physician: Dr. Anshu Lopez  Diagnosis: Malignant neoplasm of right breast in female, estrogen receptor positive, unspecified site of breast [facility]) (C50.911,Z17.0)  Lymphedema (I89.0)      Date of onset: June 2020 Evaluation Date: 12/13/2021 10/30/2018   LOCATION/MEASUREMENTS CASH CASTREJON   PATIENT POSITION  supine w/1 pillow supine w/1 pillow supine w/1 pillow supine w/1 pillow   LIMB POSITION 75 deg abduction 75 deg abduction 75 deg abduction 75 deg abduction   STARTING POINT 15cm from 3r compression sleeves, good fit  - fabricated foam compression pad for R trunk to improve A-I anastamosis.  Pt instr to wear under bra.   - discussed compression options for LEs (loraine's w/ microweave for lipedema)        Manual Therapy (85 min)    MLD: neck looking for new compression bra/tanks, may have better results w/ bra since a tank will roll up over her stomach.  Viewed several options on [employer]      There Ex ( 2 minutes):   - instr to lat str       There Ex  - pt to cont w/ lat stretches and arm [place] has agreed to actively participate in planning and for this course of care.                Charges: mm6   Total Timed Treatment: 85 min  Total Treatment Time: 85 min

## 2021-12-30 ENCOUNTER — OFFICE VISIT (OUTPATIENT)
Dept: PHYSICAL THERAPY | Facility: HOSPITAL | Age: 66
End: 2021-12-30
Attending: INTERNAL MEDICINE
Payer: MEDICARE

## 2021-12-30 PROCEDURE — 97140 MANUAL THERAPY 1/> REGIONS: CPT | Performed by: PHYSICAL THERAPIST

## 2021-12-30 NOTE — PROGRESS NOTES
Referring Physician: Dr. Mcghee Prior  Diagnosis: Malignant neoplasm of right breast in female, estrogen receptor positive, unspecified site of breast [facility]) (C50.911,Z17.0)  Lymphedema (I89.0)      Date of onset: June 2020 Evaluation Date: 12/13/2021 larger hips, abnormal fat distribution, tenderness)   Stemmer's Sign: negative    Arm Volume Measurements:   Lymphedema Calculations 12/13/2021 12/26/2018 11/30/2018 10/30/2018   DATE MEASURED 12/13/2021 12/26/2018 11/30/2018 10/30/2018   LOCATION/MEASUREM To:3/13/2022  Visit: #6/73  Certification From: 75/03/2049  To:3/13/2022  Visit: #5/* Visit: #6/*   Manual Therapy (40 minutes)    MLD: neck sequence, abd sequence, L axilla, A-A (ant and post), R inguinal, R A-I (supine and sidely) R axilla, R UE    Comp sidely) R axilla, R UE.  Significant time spent on trunk      STM to R lat and R triceps    Compression:  - pt instr to go to [facility] to get new compression sleeve and lynn  - pt brought in her old surgical compression bra, did not provide enoug self management once discharged  4) Increase R shoulder AROM flex 140 degrees with improved latissimus flexiblity to improve ease of dressing/bathing/and reaching overhead        PLAN:  Frequency / Duration: Patient will be seen for 1-2 x/week or a total o

## 2022-01-04 ENCOUNTER — TELEPHONE (OUTPATIENT)
Dept: PHYSICAL THERAPY | Facility: HOSPITAL | Age: 67
End: 2022-01-04

## 2022-01-04 ENCOUNTER — APPOINTMENT (OUTPATIENT)
Dept: PHYSICAL THERAPY | Facility: HOSPITAL | Age: 67
End: 2022-01-04
Attending: INTERNAL MEDICINE
Payer: MEDICARE

## 2022-01-04 NOTE — TELEPHONE ENCOUNTER
Left vm explaining that her clinician is out of the office for the week. That today's appt needs to be canceled and Thursday, jan 6th appt needs to be changed from 1030am to 945am (45minute slot).   Asked Leatha Gaucher to confirm that she received this message

## 2022-01-06 ENCOUNTER — APPOINTMENT (OUTPATIENT)
Dept: PHYSICAL THERAPY | Facility: HOSPITAL | Age: 67
End: 2022-01-06
Attending: INTERNAL MEDICINE
Payer: MEDICARE

## 2022-01-11 ENCOUNTER — OFFICE VISIT (OUTPATIENT)
Dept: PHYSICAL THERAPY | Facility: HOSPITAL | Age: 67
End: 2022-01-11
Attending: INTERNAL MEDICINE
Payer: MEDICARE

## 2022-01-11 PROCEDURE — 97140 MANUAL THERAPY 1/> REGIONS: CPT

## 2022-01-11 NOTE — PROGRESS NOTES
Referring Physician: Dr. Vania Bowden  Diagnosis: Malignant neoplasm of right breast in female, estrogen receptor positive, unspecified site of breast [facility]) (C50.911,Z17.0)  Lymphedema (I89.0)      Date of onset: June 2020 Evaluation Date: 12/13/2021 breast)  - swelling RUE, slight pitting in forearm and along triceps  - decreased flexibility R latissimus  - swelling BLEs, pt w/ lipedema characteristics BLE (feet spared, larger hips, abnormal fat distribution, tenderness)   Stemmer's Sign: negative 1/11/2022 Date: *   Visit # 3/86  Certification From: 66/03/5851  To:3/13/2022    Visit # 8/05  Certification From: 45/22/2078  To:3/13/2022  Visit: #8/73  Certification From: 60/22/1635  To:3/13/2022  Visit: #7/13  Certification From: 56/38/2376  To:3/13/ on.   - discussed trunk compression.  Recommended compression \"torsette\", Showed online sample available at [address] [place] [address] (85 min)    MLD: neck sequence, abd sequence, L axilla, A-A (ant and post), R inguinal, R A-I (supine and sidely) R axilla, R Explained Lymphedema diagnosis; informed patient of lymphedema precautions and risk reduction practices, and importance of skin care.  Discussed and demonstrated bandaging and compression options including various vendors that can be utilized              A

## 2022-01-13 ENCOUNTER — OFFICE VISIT (OUTPATIENT)
Dept: PHYSICAL THERAPY | Facility: HOSPITAL | Age: 67
End: 2022-01-13
Attending: INTERNAL MEDICINE
Payer: MEDICARE

## 2022-01-13 PROCEDURE — 97140 MANUAL THERAPY 1/> REGIONS: CPT | Performed by: PHYSICAL THERAPIST

## 2022-01-13 PROCEDURE — 97110 THERAPEUTIC EXERCISES: CPT | Performed by: PHYSICAL THERAPIST

## 2022-01-13 NOTE — PROGRESS NOTES
Referring Physician: Dr. Maria Victoria Johansen  Diagnosis: Malignant neoplasm of right breast in female, estrogen receptor positive, unspecified site of breast [facility]) (C50.911,Z17.0)  Lymphedema (I89.0)      Date of onset: June 2020 Evaluation Date: 12/13/2021 lats      12/13/2021 (Evaluation):  Sensation:  Decreased R chest/trunk (area of surgery/radiation)    AROM/Strength:     Shoulder AROM:    Flex: R 130, L 145     Abd: 145 B   Shoulder strength: 4+/5 B       Palpation: tenderness R latissimus    Edema/Tiss TOTAL VOLUME  4720.86190 8274.3773 8538.42248 8720.29373   % DIFFERENCE 4.61546 0.19295 7.56233 1.14761      '       Lymphedema Life Impact Scale: Score: LLIS Score Evaluation: 50 % impaired (0% is no impairment, 100% total impairment)      Today’s Treatme have sample in her size. Pt did fit into a Gini Strong size 6. Let patient borrow this sleeve until next appt to try to see if is able to reduce her swelling without causing pain and hand swelling (this band has a tighter band at wrist).  This garment does [place] [facility] for a measurement for a new sleeve d/t the covid surge. She will do this soon. Manual Therapy (60 min)    MLD: neck sequence, abd sequence, L axilla, A-A (ant and post), R inguinal, R A-I (supine and sidely) R axilla, R UE.  Significant ease of dressing/bathing/and reaching overhead- MET        PLAN:  Frequency / Duration: Patient will be seen for 1-2 x/week or a total of 10 visits over a 90 day period. Frequency will decrease as symptoms improve.      Treatment will include: Complete Deco

## 2022-01-13 NOTE — PATIENT INSTRUCTIONS
Thumb forward, raise arm up towards    Thumb pointed up, move arm towards head   ceiling and lower slowly  (painfree range)  then lower towards hips, painfree range      Thumb pointed up, raise arm up towards  Thumb pointed forward, move arm toward head

## 2022-01-18 ENCOUNTER — APPOINTMENT (OUTPATIENT)
Dept: PHYSICAL THERAPY | Facility: HOSPITAL | Age: 67
End: 2022-01-18
Attending: INTERNAL MEDICINE
Payer: MEDICARE

## 2022-01-20 ENCOUNTER — OFFICE VISIT (OUTPATIENT)
Dept: PHYSICAL THERAPY | Facility: HOSPITAL | Age: 67
End: 2022-01-20
Attending: INTERNAL MEDICINE
Payer: MEDICARE

## 2022-01-20 PROCEDURE — 97110 THERAPEUTIC EXERCISES: CPT | Performed by: PHYSICAL THERAPIST

## 2022-01-20 PROCEDURE — 97140 MANUAL THERAPY 1/> REGIONS: CPT | Performed by: PHYSICAL THERAPIST

## 2022-01-20 NOTE — PROGRESS NOTES
Referring Physician: Dr. Tyson Pablo  Diagnosis: Malignant neoplasm of right breast in female, estrogen receptor positive, unspecified site of breast [facility]) (C50.911,Z17.0)  Lymphedema (I89.0)      Date of onset: June 2020 Evaluation Date: 12/13/2021 MEASUREMENT I 37.2 37.5 37.8 40 39    TOTAL VOLUME  [id number] 2113.11852 5268.25309 2915.60897 [phone]   DATE MEASURED 1/20/2022 12/13/2021 12/26/2018 10/30/2018 10/30/2018   LOCATION/MEASUREMENTS LUE LUE LUE LUE LUE   MEASUREMENT A 16.2 16.2 16.2 16 Observations:    - R trunk swellin.2 cm (at level of mastectomy incision/over the left breast)  - swelling RUE, slight pitting in forearm and along triceps  - decreased flexibility R latissimus  - swelling BLEs, pt w/ lipedema characteristics BLE (fee and Response:   Date 12/13/2021 Date: 12/21/2021 Date: 12/28/2021 Date: 12/30/2021 Date: 1/11/2022 Date: 1/13/2022 1/20/2022   Visit # 0/82  Certification From: 23/32/1655  To:3/13/2022    Visit # 7/30  Certification From: 54/52/2513  To:3/13/2022  Visit: swelling without causing pain and hand swelling (this band has a tighter band at wrist).  This garment does have stronger containment qualities than her current sleeve (Juzo soft) Discussed next option would be to go to [facility] since she has a lot sequence, L axilla, A-A (ant and post), R inguinal, R A-I (supine and sidely) R axilla, R UE. Significant time spent on trunk and R elbow/ulner nerve. Encouraged pt to avoid leaning on R elbow and avoiding prolonged elbow flex.  Discussed wrapping elbow for infection- almost met  2) decrease trunk swelling by 2 cm to decrease pain and risk for infection - in progress  3) Pt to be independent with self MLD, ROM exercises, and donning/doffing compression bandages/garments to facilitate swelling reduction an

## 2022-01-25 ENCOUNTER — APPOINTMENT (OUTPATIENT)
Dept: PHYSICAL THERAPY | Facility: HOSPITAL | Age: 67
End: 2022-01-25
Attending: INTERNAL MEDICINE
Payer: MEDICARE

## 2022-01-27 ENCOUNTER — APPOINTMENT (OUTPATIENT)
Dept: PHYSICAL THERAPY | Facility: HOSPITAL | Age: 67
End: 2022-01-27
Attending: INTERNAL MEDICINE
Payer: MEDICARE

## 2022-02-01 ENCOUNTER — OFFICE VISIT (OUTPATIENT)
Dept: PHYSICAL THERAPY | Facility: HOSPITAL | Age: 67
End: 2022-02-01
Attending: INTERNAL MEDICINE
Payer: MEDICARE

## 2022-02-01 PROCEDURE — 97140 MANUAL THERAPY 1/> REGIONS: CPT | Performed by: PHYSICAL THERAPIST

## 2022-02-09 ENCOUNTER — OFFICE VISIT (OUTPATIENT)
Dept: PHYSICAL THERAPY | Facility: HOSPITAL | Age: 67
End: 2022-02-09
Attending: INTERNAL MEDICINE
Payer: MEDICARE

## 2022-02-09 PROCEDURE — 97140 MANUAL THERAPY 1/> REGIONS: CPT

## 2022-02-21 ENCOUNTER — HOSPITAL ENCOUNTER (OUTPATIENT)
Dept: MRI IMAGING | Facility: HOSPITAL | Age: 67
Discharge: HOME | End: 2022-02-21
Attending: UROLOGY
Payer: MEDICARE

## 2022-02-21 DIAGNOSIS — N28.89 LEFT RENAL MASS: ICD-10-CM

## 2022-02-21 LAB — CREAT BLD-MCNC: 0.7 MG/DL

## 2022-02-21 PROCEDURE — A9575 INJ GADOTERATE MEGLUMI 0.1ML: HCPCS | Performed by: UROLOGY

## 2022-02-21 PROCEDURE — 74183 MRI ABD W/O CNTR FLWD CNTR: CPT | Performed by: UROLOGY

## 2022-02-21 PROCEDURE — 82565 ASSAY OF CREATININE: CPT

## 2022-02-23 ENCOUNTER — OFFICE VISIT (OUTPATIENT)
Dept: PHYSICAL THERAPY | Facility: HOSPITAL | Age: 67
End: 2022-02-23
Attending: INTERNAL MEDICINE
Payer: MEDICARE

## 2022-02-23 PROCEDURE — 97140 MANUAL THERAPY 1/> REGIONS: CPT

## 2022-02-28 ENCOUNTER — OFFICE VISIT (OUTPATIENT)
Dept: PHYSICAL THERAPY | Facility: HOSPITAL | Age: 67
End: 2022-02-28
Attending: INTERNAL MEDICINE
Payer: MEDICARE

## 2022-02-28 PROCEDURE — 97140 MANUAL THERAPY 1/> REGIONS: CPT | Performed by: PHYSICAL THERAPIST

## 2022-03-01 ENCOUNTER — APPOINTMENT (OUTPATIENT)
Dept: PHYSICAL THERAPY | Facility: HOSPITAL | Age: 67
End: 2022-03-01
Attending: INTERNAL MEDICINE
Payer: MEDICARE

## 2022-03-03 ENCOUNTER — TELEPHONE (OUTPATIENT)
Dept: INTERNAL MEDICINE CLINIC | Facility: CLINIC | Age: 67
End: 2022-03-03

## 2022-03-03 NOTE — TELEPHONE ENCOUNTER
Received a fax from [facility] with patient's most recent lab results. Results placed in Dr Ariane Lovell with a barcode.

## 2022-03-03 NOTE — TELEPHONE ENCOUNTER
To Dr Xin Kitchen inquiring about lab reults. Received a fax from [facility] with patient's most recent lab results. Left on Dr Aicha Pandey.

## 2022-03-04 ENCOUNTER — OFFICE VISIT (OUTPATIENT)
Dept: HEMATOLOGY/ONCOLOGY | Facility: HOSPITAL | Age: 67
End: 2022-03-04
Attending: INTERNAL MEDICINE
Payer: MEDICARE

## 2022-03-04 VITALS
BODY MASS INDEX: 34.55 KG/M2 | OXYGEN SATURATION: 98 % | HEIGHT: 60 IN | DIASTOLIC BLOOD PRESSURE: 81 MMHG | RESPIRATION RATE: 18 BRPM | SYSTOLIC BLOOD PRESSURE: 151 MMHG | HEART RATE: 71 BPM | WEIGHT: 176 LBS | TEMPERATURE: 99 F

## 2022-03-04 DIAGNOSIS — C50.911 MALIGNANT NEOPLASM OF RIGHT BREAST IN FEMALE, ESTROGEN RECEPTOR POSITIVE, UNSPECIFIED SITE OF BREAST (HCC): Primary | ICD-10-CM

## 2022-03-04 DIAGNOSIS — Z17.0 MALIGNANT NEOPLASM OF RIGHT BREAST IN FEMALE, ESTROGEN RECEPTOR POSITIVE, UNSPECIFIED SITE OF BREAST (HCC): Primary | ICD-10-CM

## 2022-03-04 DIAGNOSIS — Z92.3 HISTORY OF RADIATION THERAPY: ICD-10-CM

## 2022-03-04 DIAGNOSIS — Z51.81 ENCOUNTER FOR MONITORING ADJUVANT HORMONAL THERAPY: ICD-10-CM

## 2022-03-04 DIAGNOSIS — Z79.899 ENCOUNTER FOR MONITORING ADJUVANT HORMONAL THERAPY: ICD-10-CM

## 2022-03-04 PROCEDURE — 99214 OFFICE O/P EST MOD 30 MIN: CPT | Performed by: INTERNAL MEDICINE

## 2022-03-17 ENCOUNTER — OFFICE VISIT (OUTPATIENT)
Dept: SURGERY | Facility: CLINIC | Age: 67
End: 2022-03-17
Payer: MEDICARE

## 2022-03-17 DIAGNOSIS — N28.89 LEFT RENAL MASS: Primary | ICD-10-CM

## 2022-03-17 PROCEDURE — 99213 OFFICE O/P EST LOW 20 MIN: CPT | Performed by: UROLOGY

## 2022-03-18 ENCOUNTER — APPOINTMENT (OUTPATIENT)
Dept: PHYSICAL THERAPY | Facility: HOSPITAL | Age: 67
End: 2022-03-18
Attending: INTERNAL MEDICINE
Payer: MEDICARE

## 2022-03-20 RX ORDER — FLUTICASONE PROPIONATE 50 MCG
SPRAY, SUSPENSION (ML) NASAL
Qty: 48 G | Refills: 1 | Status: SHIPPED | OUTPATIENT
Start: 2022-03-20

## 2022-04-07 ENCOUNTER — APPOINTMENT (OUTPATIENT)
Dept: PHYSICAL THERAPY | Facility: HOSPITAL | Age: 67
End: 2022-04-07
Attending: INTERNAL MEDICINE
Payer: MEDICARE

## 2022-04-11 RX ORDER — LETROZOLE 2.5 MG/1
TABLET, FILM COATED ORAL
Qty: 90 TABLET | Refills: 3 | Status: SHIPPED | OUTPATIENT
Start: 2022-04-11 | End: 2023-01-30

## 2022-06-20 ENCOUNTER — HOSPITAL ENCOUNTER (OUTPATIENT)
Age: 67
Discharge: HOME | End: 2022-06-20
Payer: MEDICARE

## 2022-06-20 VITALS
RESPIRATION RATE: 18 BRPM | TEMPERATURE: 98 F | DIASTOLIC BLOOD PRESSURE: 76 MMHG | HEIGHT: 60 IN | HEART RATE: 74 BPM | OXYGEN SATURATION: 99 % | SYSTOLIC BLOOD PRESSURE: 150 MMHG | BODY MASS INDEX: 34.36 KG/M2 | WEIGHT: 175 LBS

## 2022-06-20 DIAGNOSIS — R31.9 URINARY TRACT INFECTION WITH HEMATURIA, SITE UNSPECIFIED: Primary | ICD-10-CM

## 2022-06-20 DIAGNOSIS — N39.0 URINARY TRACT INFECTION WITH HEMATURIA, SITE UNSPECIFIED: Primary | ICD-10-CM

## 2022-06-20 LAB
BILIRUB UR QL STRIP: NEGATIVE
CLARITY UR: CLEAR
COLOR UR: YELLOW
GLUCOSE UR STRIP-MCNC: NEGATIVE MG/DL
KETONES UR STRIP-MCNC: NEGATIVE MG/DL
NITRITE UR QL STRIP: NEGATIVE
PH UR STRIP: 7 [PH]
PROT UR STRIP-MCNC: NEGATIVE MG/DL
SP GR UR STRIP: 1.01
UROBILINOGEN UR STRIP-ACNC: <2 MG/DL

## 2022-06-20 PROCEDURE — 81002 URINALYSIS NONAUTO W/O SCOPE: CPT | Performed by: NURSE PRACTITIONER

## 2022-06-20 PROCEDURE — 99213 OFFICE O/P EST LOW 20 MIN: CPT | Performed by: NURSE PRACTITIONER

## 2022-06-20 RX ORDER — SULFAMETHOXAZOLE AND TRIMETHOPRIM 800; 160 MG/1; MG/1
1 TABLET ORAL 2 TIMES DAILY
Qty: 14 TABLET | Refills: 0 | Status: SHIPPED | OUTPATIENT
Start: 2022-06-20 | End: 2022-06-27

## 2022-08-16 RX ORDER — FLUTICASONE PROPIONATE 50 MCG
SPRAY, SUSPENSION (ML) NASAL
Qty: 3 EACH | Refills: 3 | Status: SHIPPED | OUTPATIENT
Start: 2022-08-16

## 2022-08-22 ENCOUNTER — TELEPHONE (OUTPATIENT)
Dept: INTERNAL MEDICINE CLINIC | Facility: CLINIC | Age: 67
End: 2022-08-22

## 2022-08-22 DIAGNOSIS — Z78.0 POSTMENOPAUSAL: ICD-10-CM

## 2022-08-22 DIAGNOSIS — I89.0 LYMPHEDEMA OF RIGHT ARM: ICD-10-CM

## 2022-08-22 DIAGNOSIS — C50.911 MALIGNANT NEOPLASM OF RIGHT BREAST IN FEMALE, ESTROGEN RECEPTOR POSITIVE, UNSPECIFIED SITE OF BREAST (HCC): ICD-10-CM

## 2022-08-22 DIAGNOSIS — M81.8 OTHER OSTEOPOROSIS, UNSPECIFIED PATHOLOGICAL FRACTURE PRESENCE: ICD-10-CM

## 2022-08-22 DIAGNOSIS — Z12.31 ENCOUNTER FOR SCREENING MAMMOGRAM FOR MALIGNANT NEOPLASM OF BREAST: Primary | ICD-10-CM

## 2022-08-22 DIAGNOSIS — Z17.0 MALIGNANT NEOPLASM OF RIGHT BREAST IN FEMALE, ESTROGEN RECEPTOR POSITIVE, UNSPECIFIED SITE OF BREAST (HCC): ICD-10-CM

## 2022-08-22 NOTE — TELEPHONE ENCOUNTER
Pt. Called requesting orders for her yearly Mammo and the Bone Density Dexa can. She also needs a script for physical therapy for her Lymphodema.

## 2022-08-23 ENCOUNTER — TELEPHONE (OUTPATIENT)
Dept: PHYSICAL THERAPY | Facility: HOSPITAL | Age: 67
End: 2022-08-23

## 2022-08-23 DIAGNOSIS — C50.911 MALIGNANT NEOPLASM OF RIGHT BREAST IN FEMALE, ESTROGEN RECEPTOR POSITIVE, UNSPECIFIED SITE OF BREAST (HCC): ICD-10-CM

## 2022-08-23 DIAGNOSIS — I89.0 LYMPHEDEMA OF RIGHT ARM: Primary | ICD-10-CM

## 2022-08-23 DIAGNOSIS — Z17.0 MALIGNANT NEOPLASM OF RIGHT BREAST IN FEMALE, ESTROGEN RECEPTOR POSITIVE, UNSPECIFIED SITE OF BREAST (HCC): ICD-10-CM

## 2022-08-25 ENCOUNTER — APPOINTMENT (OUTPATIENT)
Dept: PHYSICAL THERAPY | Facility: HOSPITAL | Age: 67
End: 2022-08-25
Attending: INTERNAL MEDICINE
Payer: MEDICARE

## 2022-08-25 ENCOUNTER — TELEPHONE (OUTPATIENT)
Dept: INTERNAL MEDICINE CLINIC | Facility: CLINIC | Age: 67
End: 2022-08-25

## 2022-08-25 PROCEDURE — 97161 PT EVAL LOW COMPLEX 20 MIN: CPT | Performed by: PHYSICAL THERAPIST

## 2022-08-25 PROCEDURE — 97140 MANUAL THERAPY 1/> REGIONS: CPT | Performed by: PHYSICAL THERAPIST

## 2022-08-29 ENCOUNTER — TELEPHONE (OUTPATIENT)
Dept: PHYSICAL THERAPY | Facility: HOSPITAL | Age: 67
End: 2022-08-29

## 2022-08-30 ENCOUNTER — OFFICE VISIT (OUTPATIENT)
Dept: PHYSICAL THERAPY | Facility: HOSPITAL | Age: 67
End: 2022-08-30
Attending: INTERNAL MEDICINE
Payer: MEDICARE

## 2022-08-30 DIAGNOSIS — I89.0 LYMPHEDEMA OF RIGHT ARM: ICD-10-CM

## 2022-08-30 DIAGNOSIS — Z17.0 MALIGNANT NEOPLASM OF RIGHT BREAST IN FEMALE, ESTROGEN RECEPTOR POSITIVE, UNSPECIFIED SITE OF BREAST (HCC): ICD-10-CM

## 2022-08-30 DIAGNOSIS — C50.911 MALIGNANT NEOPLASM OF RIGHT BREAST IN FEMALE, ESTROGEN RECEPTOR POSITIVE, UNSPECIFIED SITE OF BREAST (HCC): ICD-10-CM

## 2022-08-30 PROCEDURE — 97140 MANUAL THERAPY 1/> REGIONS: CPT | Performed by: PHYSICAL THERAPIST

## 2022-08-31 ENCOUNTER — APPOINTMENT (OUTPATIENT)
Dept: PHYSICAL THERAPY | Facility: HOSPITAL | Age: 67
End: 2022-08-31
Attending: INTERNAL MEDICINE
Payer: MEDICARE

## 2022-09-01 ENCOUNTER — OFFICE VISIT (OUTPATIENT)
Dept: PHYSICAL THERAPY | Facility: HOSPITAL | Age: 67
End: 2022-09-01
Attending: INTERNAL MEDICINE
Payer: MEDICARE

## 2022-09-01 PROCEDURE — 97140 MANUAL THERAPY 1/> REGIONS: CPT | Performed by: PHYSICAL THERAPIST

## 2022-09-02 ENCOUNTER — APPOINTMENT (OUTPATIENT)
Dept: PHYSICAL THERAPY | Facility: HOSPITAL | Age: 67
End: 2022-09-02
Attending: INTERNAL MEDICINE
Payer: MEDICARE

## 2022-09-06 ENCOUNTER — OFFICE VISIT (OUTPATIENT)
Dept: HEMATOLOGY/ONCOLOGY | Facility: HOSPITAL | Age: 67
End: 2022-09-06
Attending: INTERNAL MEDICINE
Payer: MEDICARE

## 2022-09-06 VITALS
WEIGHT: 175 LBS | RESPIRATION RATE: 18 BRPM | HEIGHT: 60 IN | BODY MASS INDEX: 34.36 KG/M2 | OXYGEN SATURATION: 99 % | DIASTOLIC BLOOD PRESSURE: 80 MMHG | TEMPERATURE: 99 F | HEART RATE: 73 BPM | SYSTOLIC BLOOD PRESSURE: 145 MMHG

## 2022-09-06 DIAGNOSIS — C50.911 MALIGNANT NEOPLASM OF RIGHT BREAST IN FEMALE, ESTROGEN RECEPTOR POSITIVE, UNSPECIFIED SITE OF BREAST (HCC): Primary | ICD-10-CM

## 2022-09-06 DIAGNOSIS — Z17.0 MALIGNANT NEOPLASM OF RIGHT BREAST IN FEMALE, ESTROGEN RECEPTOR POSITIVE, UNSPECIFIED SITE OF BREAST (HCC): Primary | ICD-10-CM

## 2022-09-06 DIAGNOSIS — Z92.3 HISTORY OF RADIATION THERAPY: ICD-10-CM

## 2022-09-06 DIAGNOSIS — Z79.899 ENCOUNTER FOR MONITORING ADJUVANT HORMONAL THERAPY: ICD-10-CM

## 2022-09-06 DIAGNOSIS — Z51.81 ENCOUNTER FOR MONITORING ADJUVANT HORMONAL THERAPY: ICD-10-CM

## 2022-09-06 PROCEDURE — 99214 OFFICE O/P EST MOD 30 MIN: CPT | Performed by: INTERNAL MEDICINE

## 2022-09-08 ENCOUNTER — OFFICE VISIT (OUTPATIENT)
Dept: PHYSICAL THERAPY | Facility: HOSPITAL | Age: 67
End: 2022-09-08
Attending: INTERNAL MEDICINE
Payer: MEDICARE

## 2022-09-08 PROCEDURE — 97140 MANUAL THERAPY 1/> REGIONS: CPT

## 2022-09-12 ENCOUNTER — OFFICE VISIT (OUTPATIENT)
Dept: PHYSICAL THERAPY | Facility: HOSPITAL | Age: 67
End: 2022-09-12
Attending: INTERNAL MEDICINE
Payer: MEDICARE

## 2022-09-12 PROCEDURE — 97140 MANUAL THERAPY 1/> REGIONS: CPT

## 2022-09-14 ENCOUNTER — OFFICE VISIT (OUTPATIENT)
Dept: PHYSICAL THERAPY | Facility: HOSPITAL | Age: 67
End: 2022-09-14
Attending: INTERNAL MEDICINE
Payer: MEDICARE

## 2022-09-14 PROCEDURE — 97140 MANUAL THERAPY 1/> REGIONS: CPT

## 2022-09-15 ENCOUNTER — APPOINTMENT (OUTPATIENT)
Dept: PHYSICAL THERAPY | Facility: HOSPITAL | Age: 67
End: 2022-09-15
Attending: INTERNAL MEDICINE
Payer: MEDICARE

## 2022-09-28 ENCOUNTER — OFFICE VISIT (OUTPATIENT)
Dept: PHYSICAL THERAPY | Facility: HOSPITAL | Age: 67
End: 2022-09-28
Attending: INTERNAL MEDICINE
Payer: MEDICARE

## 2022-09-28 PROCEDURE — 97140 MANUAL THERAPY 1/> REGIONS: CPT | Performed by: PHYSICAL THERAPIST

## 2022-09-30 ENCOUNTER — TELEPHONE (OUTPATIENT)
Dept: PHYSICAL MEDICINE AND REHAB | Facility: CLINIC | Age: 67
End: 2022-09-30

## 2022-09-30 NOTE — TELEPHONE ENCOUNTER
Pt is calling to request an appt, she has a right trigger finger and would like to be seen.  Please advise-NL

## 2022-10-06 ENCOUNTER — OFFICE VISIT (OUTPATIENT)
Dept: PHYSICAL THERAPY | Facility: HOSPITAL | Age: 67
End: 2022-10-06
Attending: INTERNAL MEDICINE
Payer: MEDICARE

## 2022-10-06 PROCEDURE — 97140 MANUAL THERAPY 1/> REGIONS: CPT | Performed by: PHYSICAL THERAPIST

## 2022-10-13 ENCOUNTER — OFFICE VISIT (OUTPATIENT)
Dept: PHYSICAL THERAPY | Facility: HOSPITAL | Age: 67
End: 2022-10-13
Attending: INTERNAL MEDICINE
Payer: MEDICARE

## 2022-10-13 PROCEDURE — 97140 MANUAL THERAPY 1/> REGIONS: CPT | Performed by: PHYSICAL THERAPIST

## 2022-10-18 ENCOUNTER — HOSPITAL ENCOUNTER (OUTPATIENT)
Dept: BONE DENSITY | Age: 67
Discharge: HOME | End: 2022-10-18
Attending: INTERNAL MEDICINE
Payer: MEDICARE

## 2022-10-18 ENCOUNTER — HOSPITAL ENCOUNTER (OUTPATIENT)
Dept: MAMMOGRAPHY | Age: 67
Discharge: HOME | End: 2022-10-18
Attending: INTERNAL MEDICINE
Payer: MEDICARE

## 2022-10-18 DIAGNOSIS — Z78.0 POSTMENOPAUSAL: ICD-10-CM

## 2022-10-18 DIAGNOSIS — Z12.31 ENCOUNTER FOR SCREENING MAMMOGRAM FOR MALIGNANT NEOPLASM OF BREAST: ICD-10-CM

## 2022-10-18 DIAGNOSIS — M81.8 OTHER OSTEOPOROSIS, UNSPECIFIED PATHOLOGICAL FRACTURE PRESENCE: ICD-10-CM

## 2022-10-18 PROCEDURE — 77080 DXA BONE DENSITY AXIAL: CPT | Performed by: INTERNAL MEDICINE

## 2022-10-18 PROCEDURE — 77063 BREAST TOMOSYNTHESIS BI: CPT | Performed by: INTERNAL MEDICINE

## 2022-10-18 PROCEDURE — 77067 SCR MAMMO BI INCL CAD: CPT | Performed by: INTERNAL MEDICINE

## 2022-10-19 ENCOUNTER — OFFICE VISIT (OUTPATIENT)
Dept: PHYSICAL THERAPY | Facility: HOSPITAL | Age: 67
End: 2022-10-19
Attending: INTERNAL MEDICINE
Payer: MEDICARE

## 2022-10-19 PROCEDURE — 97140 MANUAL THERAPY 1/> REGIONS: CPT

## 2022-11-01 ENCOUNTER — OFFICE VISIT (OUTPATIENT)
Dept: PHYSICAL THERAPY | Facility: HOSPITAL | Age: 67
End: 2022-11-01
Attending: INTERNAL MEDICINE
Payer: MEDICARE

## 2022-11-01 PROCEDURE — 97140 MANUAL THERAPY 1/> REGIONS: CPT | Performed by: PHYSICAL THERAPIST

## 2022-11-03 ENCOUNTER — TELEPHONE (OUTPATIENT)
Dept: PHYSICAL MEDICINE AND REHAB | Facility: CLINIC | Age: 67
End: 2022-11-03

## 2022-11-03 ENCOUNTER — OFFICE VISIT (OUTPATIENT)
Dept: PHYSICAL MEDICINE AND REHAB | Facility: CLINIC | Age: 67
End: 2022-11-03
Payer: MEDICARE

## 2022-11-03 VITALS
WEIGHT: 176 LBS | HEART RATE: 73 BPM | HEIGHT: 60 IN | RESPIRATION RATE: 16 BRPM | BODY MASS INDEX: 34.55 KG/M2 | SYSTOLIC BLOOD PRESSURE: 136 MMHG | OXYGEN SATURATION: 98 % | DIASTOLIC BLOOD PRESSURE: 82 MMHG

## 2022-11-03 DIAGNOSIS — M65.331 TRIGGER MIDDLE FINGER OF RIGHT HAND: Primary | ICD-10-CM

## 2022-11-03 DIAGNOSIS — C77.9 PRIMARY CANCER OF RIGHT BREAST WITH STAGE 2 NODAL METASTASIS PER AMERICAN JOINT COMMITTEE ON CANCER 7TH EDITION (N2) (HCC): ICD-10-CM

## 2022-11-03 DIAGNOSIS — C50.911 PRIMARY CANCER OF RIGHT BREAST WITH STAGE 2 NODAL METASTASIS PER AMERICAN JOINT COMMITTEE ON CANCER 7TH EDITION (N2) (HCC): ICD-10-CM

## 2022-11-03 DIAGNOSIS — M65.332 ACQUIRED TRIGGER FINGER OF LEFT MIDDLE FINGER: ICD-10-CM

## 2022-11-03 DIAGNOSIS — I89.0 LYMPHEDEMA OF RIGHT ARM: ICD-10-CM

## 2022-11-03 PROCEDURE — 3075F SYST BP GE 130 - 139MM HG: CPT | Performed by: PHYSICAL MEDICINE & REHABILITATION

## 2022-11-03 PROCEDURE — 20550 NJX 1 TENDON SHEATH/LIGAMENT: CPT | Performed by: PHYSICAL MEDICINE & REHABILITATION

## 2022-11-03 PROCEDURE — 99214 OFFICE O/P EST MOD 30 MIN: CPT | Performed by: PHYSICAL MEDICINE & REHABILITATION

## 2022-11-03 PROCEDURE — 3008F BODY MASS INDEX DOCD: CPT | Performed by: PHYSICAL MEDICINE & REHABILITATION

## 2022-11-03 PROCEDURE — 3079F DIAST BP 80-89 MM HG: CPT | Performed by: PHYSICAL MEDICINE & REHABILITATION

## 2022-11-03 PROCEDURE — 76942 ECHO GUIDE FOR BIOPSY: CPT | Performed by: PHYSICAL MEDICINE & REHABILITATION

## 2022-11-03 RX ORDER — TRIAMCINOLONE ACETONIDE 40 MG/ML
40 INJECTION, SUSPENSION INTRA-ARTICULAR; INTRAMUSCULAR ONCE
Status: COMPLETED | OUTPATIENT
Start: 2022-11-03 | End: 2022-11-03

## 2022-11-03 RX ORDER — LIDOCAINE HYDROCHLORIDE 10 MG/ML
1 INJECTION, SOLUTION INFILTRATION; PERINEURAL ONCE
Status: COMPLETED | OUTPATIENT
Start: 2022-11-03 | End: 2022-11-03

## 2022-11-03 NOTE — TELEPHONE ENCOUNTER
Initiated authorization for Left 3rd finger flexor tendon sheath injection at the site of the A1 pulley under ultrasound guidance CPT [id number] with [employer] online  Status: Approved-authorization is not required per health plan  Does not require authorization  23771  Injection(s); single tendon sheath, or ligament, aponeurosis (eg, plantar \"fascia\")    Injection, triamcinolone acetonide, not otherwise specified, 10 mg  26733  Ultrasonic guidance for needle placement (eg, biopsy, aspiration, injection, localization device), imaging supervision and interpretation

## 2022-11-03 NOTE — PATIENT INSTRUCTIONS
Plan  I did bilateral 3rd finger trigger finger injections in the office today under ultrasound guidance. (see procedure note)    She will continue with her current home exercise program.    The patient will follow up in12 months, but the patient will call me 2 weeks after having the injection to let me know how the injection worked.

## 2022-11-03 NOTE — PROCEDURES
I did a bilateral 3rd finger trigger finger/flexor digitorum superficialis tendon sheath injection in the office under ultrasound guidance at the level of the A1 pulley. Under ultrasound guidance the bilateral 3rd flexor digitorum superficialis tendons and A1 pulley were identified. Isidor Mt were placed on the patient's skin and it was cleaned with betadyne swabs x 3 and anesthetized with ethyl chloride spray. A 27 gauge needle was inserted and directed to the A1 pulley just superior to the flexor digitorum superficialis tendons. Aspiration was performed and no blood, fluid, or air was aspirated and the tendon sheaths were injected with 1 ml of 40 mg of Kenalog/ml and 0.5 ml of 1% lidocaine without epinephrine. The patient tolerated the procedure well. Band aids and triple antibiotic ointment were applied. Ultrasound images were saved to the unit and will be transferred to PACS.

## 2022-11-17 ENCOUNTER — PATIENT MESSAGE (OUTPATIENT)
Dept: PHYSICAL MEDICINE AND REHAB | Facility: CLINIC | Age: 67
End: 2022-11-17

## 2022-11-18 NOTE — TELEPHONE ENCOUNTER
From: Yvonne Daigle  To: Josiane Alvarez MD  Sent: 11/17/2022 10:24 AM CST  Subject: Injection    This is a follow up to an injection for trigger finger on middle finger of both hands. My left hand is 100% better. My right hand I would say is about 90% better. I still have difficulty making a tight fist but there is no pain associated with it.     Thank you,   Florence Player

## 2022-11-27 RX ORDER — NORTRIPTYLINE HYDROCHLORIDE 10 MG/1
CAPSULE ORAL
Qty: 180 CAPSULE | Refills: 0 | Status: SHIPPED | OUTPATIENT
Start: 2022-11-27

## 2022-11-27 RX ORDER — PANTOPRAZOLE SODIUM 40 MG/1
TABLET, DELAYED RELEASE ORAL
Qty: 90 TABLET | Refills: 0 | Status: SHIPPED | OUTPATIENT
Start: 2022-11-27

## 2022-11-30 ENCOUNTER — TELEPHONE (OUTPATIENT)
Dept: INTERNAL MEDICINE CLINIC | Facility: CLINIC | Age: 67
End: 2022-11-30

## 2022-11-30 DIAGNOSIS — Z00.00 ANNUAL PHYSICAL EXAM: Primary | ICD-10-CM

## 2022-11-30 DIAGNOSIS — Z17.0 MALIGNANT NEOPLASM OF RIGHT BREAST IN FEMALE, ESTROGEN RECEPTOR POSITIVE, UNSPECIFIED SITE OF BREAST (HCC): ICD-10-CM

## 2022-11-30 DIAGNOSIS — E55.9 VITAMIN D DEFICIENCY: ICD-10-CM

## 2022-11-30 DIAGNOSIS — M32.9 SYSTEMIC LUPUS ERYTHEMATOSUS, UNSPECIFIED SLE TYPE, UNSPECIFIED ORGAN INVOLVEMENT STATUS (HCC): ICD-10-CM

## 2022-11-30 DIAGNOSIS — C50.911 MALIGNANT NEOPLASM OF RIGHT BREAST IN FEMALE, ESTROGEN RECEPTOR POSITIVE, UNSPECIFIED SITE OF BREAST (HCC): ICD-10-CM

## 2022-11-30 DIAGNOSIS — E78.00 HYPERCHOLESTEREMIA: ICD-10-CM

## 2022-11-30 NOTE — TELEPHONE ENCOUNTER
Patient is requesting a blood work order be entered for [facility] (is [facility] able to see the order?)  Patient is scheduled on 12/5 for a Medicare Annual

## 2022-12-03 LAB
ABSOLUTE BASOPHILS: 33 CELLS/UL (ref 0–200)
ABSOLUTE EOSINOPHILS: 172 CELLS/UL (ref 15–500)
ABSOLUTE LYMPHOCYTES: 1943 CELLS/UL (ref 850–3900)
ABSOLUTE MONOCYTES: 558 CELLS/UL (ref 200–950)
ABSOLUTE NEUTROPHILS: 5494 CELLS/UL (ref 1500–7800)
ALBUMIN/GLOBULIN RATIO: 1.5 (CALC) (ref 1–2.5)
ALBUMIN: 4.6 G/DL (ref 3.6–5.1)
ALKALINE PHOSPHATASE: 75 U/L (ref 37–153)
ALT: 16 U/L (ref 6–29)
AST: 19 U/L (ref 10–35)
BASOPHILS: 0.4 %
BILIRUBIN, TOTAL: 0.3 MG/DL (ref 0.2–1.2)
BUN: 8 MG/DL (ref 7–25)
CALCIUM: 9.7 MG/DL (ref 8.6–10.4)
CARBON DIOXIDE: 30 MMOL/L (ref 20–32)
CHLORIDE: 100 MMOL/L (ref 98–110)
CHOL/HDLC RATIO: 3.1 (CALC)
CHOLESTEROL, TOTAL: 200 MG/DL
CREATININE: 0.72 MG/DL (ref 0.5–1.05)
EGFR: 93 ML/MIN/1.73M2
EOSINOPHILS: 2.1 %
GLOBULIN: 3 G/DL (CALC) (ref 1.9–3.7)
GLUCOSE: 123 MG/DL (ref 65–139)
HDL CHOLESTEROL: 64 MG/DL
HEMATOCRIT: 41.6 % (ref 35–45)
HEMOGLOBIN: 13.8 G/DL (ref 11.7–15.5)
LDL-CHOLESTEROL: 119 MG/DL (CALC)
LYMPHOCYTES: 23.7 %
MCH: 29.5 PG (ref 27–33)
MCHC: 33.2 G/DL (ref 32–36)
MCV: 88.9 FL (ref 80–100)
MONOCYTES: 6.8 %
MPV: 9.6 FL (ref 7.5–12.5)
NEUTROPHILS: 67 %
NON-HDL CHOLESTEROL: 136 MG/DL (CALC)
PLATELET COUNT: 364 THOUSAND/UL (ref 140–400)
POTASSIUM: 4.6 MMOL/L (ref 3.5–5.3)
PROTEIN, TOTAL: 7.6 G/DL (ref 6.1–8.1)
RDW: 12.6 % (ref 11–15)
RED BLOOD CELL COUNT: 4.68 MILLION/UL (ref 3.8–5.1)
SODIUM: 141 MMOL/L (ref 135–146)
TRIGLYCERIDES: 80 MG/DL
TSH W/REFLEX TO FT4: 1.11 MIU/L (ref 0.4–4.5)
VITAMIN D, 25-OH, TOTAL: 69 NG/ML (ref 30–100)
WHITE BLOOD CELL COUNT: 8.2 THOUSAND/UL (ref 3.8–10.8)

## 2022-12-05 ENCOUNTER — OFFICE VISIT (OUTPATIENT)
Dept: INTERNAL MEDICINE CLINIC | Facility: CLINIC | Age: 67
End: 2022-12-05
Payer: MEDICARE

## 2022-12-05 VITALS
RESPIRATION RATE: 16 BRPM | HEART RATE: 68 BPM | BODY MASS INDEX: 33.7 KG/M2 | WEIGHT: 171.63 LBS | HEIGHT: 60 IN | OXYGEN SATURATION: 98 % | DIASTOLIC BLOOD PRESSURE: 76 MMHG | TEMPERATURE: 98 F | SYSTOLIC BLOOD PRESSURE: 112 MMHG

## 2022-12-05 DIAGNOSIS — C50.911 MALIGNANT NEOPLASM OF RIGHT BREAST IN FEMALE, ESTROGEN RECEPTOR POSITIVE, UNSPECIFIED SITE OF BREAST (HCC): ICD-10-CM

## 2022-12-05 DIAGNOSIS — Z17.0 MALIGNANT NEOPLASM OF RIGHT BREAST IN FEMALE, ESTROGEN RECEPTOR POSITIVE, UNSPECIFIED SITE OF BREAST (HCC): ICD-10-CM

## 2022-12-05 DIAGNOSIS — E66.01 SEVERE OBESITY (BMI 35.0-39.9) WITH COMORBIDITY (HCC): ICD-10-CM

## 2022-12-05 DIAGNOSIS — N28.1 KIDNEY CYSTS: ICD-10-CM

## 2022-12-05 DIAGNOSIS — M47.816 LUMBAR SPONDYLOSIS: ICD-10-CM

## 2022-12-05 DIAGNOSIS — E78.00 HYPERCHOLESTEREMIA: ICD-10-CM

## 2022-12-05 DIAGNOSIS — M65.331 TRIGGER MIDDLE FINGER OF RIGHT HAND: ICD-10-CM

## 2022-12-05 DIAGNOSIS — R51.9 CHRONIC NONINTRACTABLE HEADACHE, UNSPECIFIED HEADACHE TYPE: ICD-10-CM

## 2022-12-05 DIAGNOSIS — M32.9 SYSTEMIC LUPUS ERYTHEMATOSUS, UNSPECIFIED SLE TYPE, UNSPECIFIED ORGAN INVOLVEMENT STATUS (HCC): ICD-10-CM

## 2022-12-05 DIAGNOSIS — Z87.440 HISTORY OF UTI: ICD-10-CM

## 2022-12-05 DIAGNOSIS — G89.29 CHRONIC NONINTRACTABLE HEADACHE, UNSPECIFIED HEADACHE TYPE: ICD-10-CM

## 2022-12-05 DIAGNOSIS — Z00.00 ANNUAL PHYSICAL EXAM: Primary | ICD-10-CM

## 2022-12-05 DIAGNOSIS — I89.0 LYMPHEDEMA OF RIGHT ARM: ICD-10-CM

## 2022-12-05 DIAGNOSIS — M81.8 OTHER OSTEOPOROSIS, UNSPECIFIED PATHOLOGICAL FRACTURE PRESENCE: ICD-10-CM

## 2022-12-05 DIAGNOSIS — G62.9 PERIPHERAL POLYNEUROPATHY: ICD-10-CM

## 2022-12-05 DIAGNOSIS — E55.9 VITAMIN D DEFICIENCY: ICD-10-CM

## 2022-12-05 DIAGNOSIS — K21.9 GASTROESOPHAGEAL REFLUX DISEASE, UNSPECIFIED WHETHER ESOPHAGITIS PRESENT: ICD-10-CM

## 2022-12-05 DIAGNOSIS — Z86.59 HISTORY OF DEPRESSION: ICD-10-CM

## 2022-12-05 DIAGNOSIS — K64.8 INTERNAL HEMORRHOID: ICD-10-CM

## 2022-12-05 RX ORDER — NORTRIPTYLINE HYDROCHLORIDE 10 MG/1
20 CAPSULE ORAL NIGHTLY
Qty: 180 CAPSULE | Refills: 2 | Status: SHIPPED | OUTPATIENT
Start: 2023-02-13

## 2022-12-05 RX ORDER — GABAPENTIN 300 MG/1
CAPSULE ORAL
Qty: 450 CAPSULE | Refills: 3 | Status: SHIPPED | OUTPATIENT
Start: 2022-12-05

## 2022-12-05 RX ORDER — PANTOPRAZOLE SODIUM 40 MG/1
40 TABLET, DELAYED RELEASE ORAL
Qty: 90 TABLET | Refills: 2 | Status: SHIPPED | OUTPATIENT
Start: 2023-02-13

## 2022-12-31 ENCOUNTER — APPOINTMENT (OUTPATIENT)
Dept: GENERAL RADIOLOGY | Age: 67
End: 2022-12-31
Attending: NURSE PRACTITIONER
Payer: MEDICARE

## 2022-12-31 ENCOUNTER — HOSPITAL ENCOUNTER (OUTPATIENT)
Age: 67
Discharge: HOME | End: 2022-12-31
Payer: MEDICARE

## 2022-12-31 VITALS
SYSTOLIC BLOOD PRESSURE: 149 MMHG | TEMPERATURE: 99 F | HEART RATE: 95 BPM | RESPIRATION RATE: 20 BRPM | DIASTOLIC BLOOD PRESSURE: 86 MMHG | OXYGEN SATURATION: 95 %

## 2022-12-31 DIAGNOSIS — M25.512 ACUTE PAIN OF LEFT SHOULDER: ICD-10-CM

## 2022-12-31 DIAGNOSIS — R07.81 RIB PAIN ON LEFT SIDE: ICD-10-CM

## 2022-12-31 DIAGNOSIS — S22.32XA CLOSED FRACTURE OF ONE RIB OF LEFT SIDE, INITIAL ENCOUNTER: Primary | ICD-10-CM

## 2022-12-31 PROCEDURE — 73030 X-RAY EXAM OF SHOULDER: CPT | Performed by: NURSE PRACTITIONER

## 2022-12-31 PROCEDURE — 99213 OFFICE O/P EST LOW 20 MIN: CPT | Performed by: NURSE PRACTITIONER

## 2022-12-31 PROCEDURE — 71101 X-RAY EXAM UNILAT RIBS/CHEST: CPT | Performed by: NURSE PRACTITIONER

## 2022-12-31 RX ORDER — LIDOCAINE 4 G/G
1 PATCH TOPICAL EVERY 24 HOURS
Qty: 30 PATCH | Refills: 0 | Status: SHIPPED | OUTPATIENT
Start: 2022-12-31 | End: 2023-01-30

## 2022-12-31 RX ORDER — NAPROXEN 500 MG/1
500 TABLET ORAL 2 TIMES DAILY PRN
Qty: 20 TABLET | Refills: 0 | Status: SHIPPED | OUTPATIENT
Start: 2022-12-31 | End: 2023-01-10

## 2022-12-31 NOTE — ED INITIAL ASSESSMENT (HPI)
Patient presents with complaints of fall on Monday. Reports having shortness of breath with pain that starts at the ribs and radiates up the neck. Primary impact of fall was to the left side.

## 2023-01-13 ENCOUNTER — TELEPHONE (OUTPATIENT)
Dept: INTERNAL MEDICINE CLINIC | Facility: CLINIC | Age: 68
End: 2023-01-13

## 2023-01-13 DIAGNOSIS — N30.00 ACUTE CYSTITIS WITHOUT HEMATURIA: Primary | ICD-10-CM

## 2023-01-13 RX ORDER — SULFAMETHOXAZOLE AND TRIMETHOPRIM 800; 160 MG/1; MG/1
1 TABLET ORAL 2 TIMES DAILY
Qty: 14 TABLET | Refills: 0 | Status: SHIPPED | OUTPATIENT
Start: 2023-01-13

## 2023-01-13 NOTE — TELEPHONE ENCOUNTER
UTI symptoms:    [x]Frequency  []Urgency  []Pain/burning  []Blood in urine  []Low back pain  []Flank pain  []Fevers/chills  [x]Odor  []Confusion    NOTES:    Please advise - called patient with above SX since Monday - [facility] [place]  UA and culture pending - to DR. VALDEZ

## 2023-01-13 NOTE — TELEPHONE ENCOUNTER
Patient is calling with a possible UTI. Patient is experiencing an odor, cloudy urine, and frequency. Symptoms started Monday. Patient is hoping for a urine test. Patient will be at the hospital today visiting her mother and is hoping to complete the order today.  # [phone], please call patient when order is placed so patient can complete the order at the lab.

## 2023-01-26 ENCOUNTER — TELEPHONE (OUTPATIENT)
Dept: INTERNAL MEDICINE CLINIC | Facility: CLINIC | Age: 68
End: 2023-01-26

## 2023-01-26 DIAGNOSIS — Z17.0 MALIGNANT NEOPLASM OF RIGHT BREAST IN FEMALE, ESTROGEN RECEPTOR POSITIVE, UNSPECIFIED SITE OF BREAST (HCC): ICD-10-CM

## 2023-01-26 DIAGNOSIS — C50.911 MALIGNANT NEOPLASM OF RIGHT BREAST IN FEMALE, ESTROGEN RECEPTOR POSITIVE, UNSPECIFIED SITE OF BREAST (HCC): ICD-10-CM

## 2023-01-26 DIAGNOSIS — I89.0 LYMPHEDEMA OF RIGHT ARM: Primary | ICD-10-CM

## 2023-01-26 NOTE — TELEPHONE ENCOUNTER
Patient asking for order for Physical Therapy lymphedema. PT at [address]    No appointment scheduled for PT yet. Patient will watch for order in [address]

## 2023-02-06 ENCOUNTER — TELEPHONE (OUTPATIENT)
Dept: PHYSICAL THERAPY | Facility: HOSPITAL | Age: 68
End: 2023-02-06

## 2023-02-06 ENCOUNTER — OFFICE VISIT (OUTPATIENT)
Dept: PHYSICAL THERAPY | Facility: HOSPITAL | Age: 68
End: 2023-02-06
Attending: INTERNAL MEDICINE
Payer: MEDICARE

## 2023-02-06 DIAGNOSIS — C50.911 MALIGNANT NEOPLASM OF RIGHT BREAST IN FEMALE, ESTROGEN RECEPTOR POSITIVE, UNSPECIFIED SITE OF BREAST (HCC): ICD-10-CM

## 2023-02-06 DIAGNOSIS — I89.0 LYMPHEDEMA OF RIGHT ARM: ICD-10-CM

## 2023-02-06 DIAGNOSIS — Z17.0 MALIGNANT NEOPLASM OF RIGHT BREAST IN FEMALE, ESTROGEN RECEPTOR POSITIVE, UNSPECIFIED SITE OF BREAST (HCC): ICD-10-CM

## 2023-02-06 PROCEDURE — 97140 MANUAL THERAPY 1/> REGIONS: CPT | Performed by: PHYSICAL THERAPIST

## 2023-02-06 PROCEDURE — 97161 PT EVAL LOW COMPLEX 20 MIN: CPT | Performed by: PHYSICAL THERAPIST

## 2023-02-10 ENCOUNTER — OFFICE VISIT (OUTPATIENT)
Dept: PHYSICAL THERAPY | Facility: HOSPITAL | Age: 68
End: 2023-02-10
Attending: INTERNAL MEDICINE
Payer: MEDICARE

## 2023-02-10 PROCEDURE — 97140 MANUAL THERAPY 1/> REGIONS: CPT

## 2023-02-14 ENCOUNTER — OFFICE VISIT (OUTPATIENT)
Dept: PHYSICAL THERAPY | Facility: HOSPITAL | Age: 68
End: 2023-02-14
Attending: INTERNAL MEDICINE
Payer: MEDICARE

## 2023-02-14 PROCEDURE — 97140 MANUAL THERAPY 1/> REGIONS: CPT | Performed by: PHYSICAL THERAPIST

## 2023-02-16 ENCOUNTER — OFFICE VISIT (OUTPATIENT)
Dept: PHYSICAL THERAPY | Facility: HOSPITAL | Age: 68
End: 2023-02-16
Attending: INTERNAL MEDICINE
Payer: MEDICARE

## 2023-02-16 PROCEDURE — 97140 MANUAL THERAPY 1/> REGIONS: CPT | Performed by: PHYSICAL THERAPIST

## 2023-02-20 ENCOUNTER — OFFICE VISIT (OUTPATIENT)
Dept: PHYSICAL THERAPY | Facility: HOSPITAL | Age: 68
End: 2023-02-20
Attending: INTERNAL MEDICINE
Payer: MEDICARE

## 2023-02-20 PROCEDURE — 97140 MANUAL THERAPY 1/> REGIONS: CPT | Performed by: PHYSICAL THERAPIST

## 2023-02-21 ENCOUNTER — HOSPITAL ENCOUNTER (OUTPATIENT)
Dept: MRI IMAGING | Facility: HOSPITAL | Age: 68
Discharge: HOME | End: 2023-02-21
Attending: UROLOGY
Payer: MEDICARE

## 2023-02-21 DIAGNOSIS — N28.89 LEFT RENAL MASS: ICD-10-CM

## 2023-02-21 PROCEDURE — A9575 INJ GADOTERATE MEGLUMI 0.1ML: HCPCS | Performed by: UROLOGY

## 2023-02-21 PROCEDURE — 74183 MRI ABD W/O CNTR FLWD CNTR: CPT | Performed by: UROLOGY

## 2023-02-21 RX ORDER — GADOTERATE MEGLUMINE 376.9 MG/ML
20 INJECTION INTRAVENOUS
Status: COMPLETED | OUTPATIENT
Start: 2023-02-21 | End: 2023-02-21

## 2023-02-21 RX ADMIN — GADOTERATE MEGLUMINE 16 ML: 376.9 INJECTION INTRAVENOUS at 14:19:00

## 2023-02-22 ENCOUNTER — TELEPHONE (OUTPATIENT)
Dept: INTERNAL MEDICINE CLINIC | Facility: CLINIC | Age: 68
End: 2023-02-22

## 2023-02-22 NOTE — TELEPHONE ENCOUNTER
[facility] called to check status on order for right arm compression pump needing   Dr Kimber Pang as faxed  on 2/17   Will re fax today

## 2023-02-23 NOTE — TELEPHONE ENCOUNTER
Form faxed back to [facility] at [phone]. Received fax confirmation. Copy to scanning.

## 2023-02-24 ENCOUNTER — OFFICE VISIT (OUTPATIENT)
Dept: PHYSICAL THERAPY | Facility: HOSPITAL | Age: 68
End: 2023-02-24
Attending: INTERNAL MEDICINE
Payer: MEDICARE

## 2023-02-24 PROCEDURE — 97140 MANUAL THERAPY 1/> REGIONS: CPT | Performed by: PHYSICAL THERAPIST

## 2023-03-01 ENCOUNTER — APPOINTMENT (OUTPATIENT)
Dept: PHYSICAL THERAPY | Facility: HOSPITAL | Age: 68
End: 2023-03-01
Attending: INTERNAL MEDICINE
Payer: MEDICARE

## 2023-03-06 ENCOUNTER — OFFICE VISIT (OUTPATIENT)
Dept: PHYSICAL THERAPY | Facility: HOSPITAL | Age: 68
End: 2023-03-06
Attending: INTERNAL MEDICINE
Payer: MEDICARE

## 2023-03-06 PROCEDURE — 97140 MANUAL THERAPY 1/> REGIONS: CPT

## 2023-03-07 ENCOUNTER — OFFICE VISIT (OUTPATIENT)
Dept: HEMATOLOGY/ONCOLOGY | Facility: HOSPITAL | Age: 68
End: 2023-03-07
Attending: INTERNAL MEDICINE
Payer: MEDICARE

## 2023-03-07 VITALS
OXYGEN SATURATION: 96 % | TEMPERATURE: 99 F | DIASTOLIC BLOOD PRESSURE: 81 MMHG | HEIGHT: 60 IN | WEIGHT: 173 LBS | HEART RATE: 77 BPM | SYSTOLIC BLOOD PRESSURE: 154 MMHG | BODY MASS INDEX: 33.96 KG/M2 | RESPIRATION RATE: 18 BRPM

## 2023-03-07 DIAGNOSIS — Z17.0 MALIGNANT NEOPLASM OF RIGHT BREAST IN FEMALE, ESTROGEN RECEPTOR POSITIVE, UNSPECIFIED SITE OF BREAST (HCC): Primary | ICD-10-CM

## 2023-03-07 DIAGNOSIS — Z12.31 ENCOUNTER FOR SCREENING MAMMOGRAM FOR BREAST CANCER: ICD-10-CM

## 2023-03-07 DIAGNOSIS — Z79.899 ENCOUNTER FOR MONITORING ADJUVANT HORMONAL THERAPY: ICD-10-CM

## 2023-03-07 DIAGNOSIS — C50.911 MALIGNANT NEOPLASM OF RIGHT BREAST IN FEMALE, ESTROGEN RECEPTOR POSITIVE, UNSPECIFIED SITE OF BREAST (HCC): Primary | ICD-10-CM

## 2023-03-07 DIAGNOSIS — Z51.81 ENCOUNTER FOR MONITORING ADJUVANT HORMONAL THERAPY: ICD-10-CM

## 2023-03-07 DIAGNOSIS — Z92.3 HISTORY OF RADIATION THERAPY: ICD-10-CM

## 2023-03-07 PROCEDURE — 99214 OFFICE O/P EST MOD 30 MIN: CPT | Performed by: INTERNAL MEDICINE

## 2023-03-10 ENCOUNTER — OFFICE VISIT (OUTPATIENT)
Dept: PHYSICAL THERAPY | Facility: HOSPITAL | Age: 68
End: 2023-03-10
Attending: INTERNAL MEDICINE
Payer: MEDICARE

## 2023-03-10 PROCEDURE — 97140 MANUAL THERAPY 1/> REGIONS: CPT

## 2023-03-10 NOTE — TELEPHONE ENCOUNTER
Pt saw Dr. Adrian Sic at mother's injection appointment on 10/25/19. Informed Dr. Nguyễn Esquivel her trigger finger has healed and together they decided she no longer needed her f/u appt on 11/5/19. Pt called to cancel accordingly. Appt cancelled. 8 (severe pain)

## 2023-03-13 ENCOUNTER — APPOINTMENT (OUTPATIENT)
Dept: PHYSICAL THERAPY | Facility: HOSPITAL | Age: 68
End: 2023-03-13
Attending: INTERNAL MEDICINE
Payer: MEDICARE

## 2023-03-14 ENCOUNTER — OFFICE VISIT (OUTPATIENT)
Dept: PHYSICAL THERAPY | Facility: HOSPITAL | Age: 68
End: 2023-03-14
Attending: INTERNAL MEDICINE
Payer: MEDICARE

## 2023-03-14 PROCEDURE — 97140 MANUAL THERAPY 1/> REGIONS: CPT | Performed by: PHYSICAL THERAPIST

## 2023-03-16 ENCOUNTER — OFFICE VISIT (OUTPATIENT)
Dept: PHYSICAL THERAPY | Facility: HOSPITAL | Age: 68
End: 2023-03-16
Attending: INTERNAL MEDICINE
Payer: MEDICARE

## 2023-03-16 PROCEDURE — 97140 MANUAL THERAPY 1/> REGIONS: CPT | Performed by: PHYSICAL THERAPIST

## 2023-03-17 ENCOUNTER — TELEPHONE (OUTPATIENT)
Dept: PHYSICAL THERAPY | Facility: HOSPITAL | Age: 68
End: 2023-03-17

## 2023-03-20 ENCOUNTER — OFFICE VISIT (OUTPATIENT)
Dept: SURGERY | Facility: CLINIC | Age: 68
End: 2023-03-20

## 2023-03-20 DIAGNOSIS — N28.89 LEFT RENAL MASS: Primary | ICD-10-CM

## 2023-03-20 PROCEDURE — 99213 OFFICE O/P EST LOW 20 MIN: CPT | Performed by: UROLOGY

## 2023-03-21 ENCOUNTER — APPOINTMENT (OUTPATIENT)
Dept: PHYSICAL THERAPY | Facility: HOSPITAL | Age: 68
End: 2023-03-21
Attending: INTERNAL MEDICINE
Payer: MEDICARE

## 2023-03-23 ENCOUNTER — OFFICE VISIT (OUTPATIENT)
Dept: PHYSICAL THERAPY | Facility: HOSPITAL | Age: 68
End: 2023-03-23
Attending: INTERNAL MEDICINE
Payer: MEDICARE

## 2023-03-23 PROCEDURE — 97140 MANUAL THERAPY 1/> REGIONS: CPT | Performed by: PHYSICAL THERAPIST

## 2023-03-24 ENCOUNTER — APPOINTMENT (OUTPATIENT)
Dept: PHYSICAL THERAPY | Facility: HOSPITAL | Age: 68
End: 2023-03-24
Attending: INTERNAL MEDICINE
Payer: MEDICARE

## 2023-03-29 ENCOUNTER — APPOINTMENT (OUTPATIENT)
Dept: PHYSICAL THERAPY | Facility: HOSPITAL | Age: 68
End: 2023-03-29
Attending: INTERNAL MEDICINE
Payer: MEDICARE

## 2023-04-17 ENCOUNTER — OFFICE VISIT (OUTPATIENT)
Dept: PHYSICAL THERAPY | Facility: HOSPITAL | Age: 68
End: 2023-04-17
Attending: INTERNAL MEDICINE
Payer: MEDICARE

## 2023-04-17 PROCEDURE — 97140 MANUAL THERAPY 1/> REGIONS: CPT | Performed by: PHYSICAL THERAPIST

## 2023-04-27 ENCOUNTER — OFFICE VISIT (OUTPATIENT)
Dept: PHYSICAL THERAPY | Facility: HOSPITAL | Age: 68
End: 2023-04-27
Attending: INTERNAL MEDICINE
Payer: MEDICARE

## 2023-04-27 PROCEDURE — 97140 MANUAL THERAPY 1/> REGIONS: CPT | Performed by: PHYSICAL THERAPIST

## 2023-05-05 ENCOUNTER — APPOINTMENT (OUTPATIENT)
Dept: PHYSICAL THERAPY | Facility: HOSPITAL | Age: 68
End: 2023-05-05
Attending: INTERNAL MEDICINE
Payer: MEDICARE

## 2023-05-09 ENCOUNTER — APPOINTMENT (OUTPATIENT)
Dept: PHYSICAL THERAPY | Facility: HOSPITAL | Age: 68
End: 2023-05-09
Attending: INTERNAL MEDICINE
Payer: MEDICARE

## 2023-05-19 ENCOUNTER — OFFICE VISIT (OUTPATIENT)
Dept: PHYSICAL THERAPY | Facility: HOSPITAL | Age: 68
End: 2023-05-19
Attending: INTERNAL MEDICINE
Payer: MEDICARE

## 2023-05-19 PROCEDURE — 97140 MANUAL THERAPY 1/> REGIONS: CPT

## 2023-05-23 ENCOUNTER — OFFICE VISIT (OUTPATIENT)
Dept: PHYSICAL THERAPY | Facility: HOSPITAL | Age: 68
End: 2023-05-23
Attending: INTERNAL MEDICINE
Payer: MEDICARE

## 2023-05-23 PROCEDURE — 97140 MANUAL THERAPY 1/> REGIONS: CPT

## 2023-05-30 NOTE — TELEPHONE ENCOUNTER
Dr. Ciara Forde' message relayed to pt who verbalized understanding. Render Risk Assessment In Note?: yes Detail Level: Simple Additional Notes: S/p Efudex to nose and cutaneous lip, no residual lesions

## 2023-05-31 ENCOUNTER — OFFICE VISIT (OUTPATIENT)
Dept: PHYSICAL THERAPY | Facility: HOSPITAL | Age: 68
End: 2023-05-31
Attending: INTERNAL MEDICINE
Payer: MEDICARE

## 2023-05-31 PROCEDURE — 97140 MANUAL THERAPY 1/> REGIONS: CPT

## 2023-06-08 ENCOUNTER — OFFICE VISIT (OUTPATIENT)
Dept: PHYSICAL MEDICINE AND REHAB | Facility: CLINIC | Age: 68
End: 2023-06-08
Payer: MEDICARE

## 2023-06-08 ENCOUNTER — TELEPHONE (OUTPATIENT)
Dept: PHYSICAL MEDICINE AND REHAB | Facility: CLINIC | Age: 68
End: 2023-06-08

## 2023-06-08 ENCOUNTER — OFFICE VISIT (OUTPATIENT)
Dept: PHYSICAL THERAPY | Facility: HOSPITAL | Age: 68
End: 2023-06-08
Attending: INTERNAL MEDICINE
Payer: MEDICARE

## 2023-06-08 VITALS
HEART RATE: 99 BPM | HEIGHT: 60 IN | OXYGEN SATURATION: 96 % | DIASTOLIC BLOOD PRESSURE: 72 MMHG | SYSTOLIC BLOOD PRESSURE: 140 MMHG | WEIGHT: 173 LBS | BODY MASS INDEX: 33.96 KG/M2

## 2023-06-08 DIAGNOSIS — M50.90 CERVICAL DISC DISEASE: ICD-10-CM

## 2023-06-08 DIAGNOSIS — M65.331 TRIGGER MIDDLE FINGER OF RIGHT HAND: Primary | ICD-10-CM

## 2023-06-08 DIAGNOSIS — C50.911 PRIMARY CANCER OF RIGHT BREAST WITH STAGE 2 NODAL METASTASIS PER AMERICAN JOINT COMMITTEE ON CANCER 7TH EDITION (N2) (HCC): ICD-10-CM

## 2023-06-08 DIAGNOSIS — M65.332 ACQUIRED TRIGGER FINGER OF LEFT MIDDLE FINGER: ICD-10-CM

## 2023-06-08 DIAGNOSIS — M48.02 CERVICAL STENOSIS OF SPINE: ICD-10-CM

## 2023-06-08 DIAGNOSIS — C77.9 PRIMARY CANCER OF RIGHT BREAST WITH STAGE 2 NODAL METASTASIS PER AMERICAN JOINT COMMITTEE ON CANCER 7TH EDITION (N2) (HCC): ICD-10-CM

## 2023-06-08 DIAGNOSIS — J44.9 ASTHMA WITH COPD (CHRONIC OBSTRUCTIVE PULMONARY DISEASE) (HCC): Chronic | ICD-10-CM

## 2023-06-08 DIAGNOSIS — I89.0 LYMPHEDEMA OF RIGHT ARM: ICD-10-CM

## 2023-06-08 PROCEDURE — 97140 MANUAL THERAPY 1/> REGIONS: CPT | Performed by: PHYSICAL THERAPIST

## 2023-06-08 NOTE — PATIENT INSTRUCTIONS
Plan  I did bilateral 3rd finger trigger finger injections in the office today under ultrasound guidance. (see procedure note)    The patient will follow up in 3 months, but the patient will call me in 2 weeks to let me know how the injections worked. She might need to see a hand surgeon or have percutaneous releases if the above injections do not help for a significant period of time.

## 2023-06-08 NOTE — TELEPHONE ENCOUNTER
Initiated authorization for Bilateral 3rd CMC injection under ultrasound guidance CPT 83028-50,  with [employer]  Status: Approved-authorization is not required per health plan        inj done today

## 2023-06-08 NOTE — PROCEDURES
I did a bilateral 3rd finger trigger finger/flexor digitorum superficialis tendon sheath injections in the office under ultrasound guidance at the level of the A1 pulleys. Under ultrasound guidance the bilateral 3rd flexor digitorum superficialis tendons and A1 pulleys were identified. Jeral Mew were placed on the patient's skin and the skin was cleaned with betadyne swabs x 3 and anesthetized with cold spray bilaterally. A 27 gauge needle was inserted and directed to the A1 pulley just superior to the flexor digitorum superficialis tendon on each side. Aspiration was performed and no blood, fluid, or air was aspirated and the tendon sheaths were injected with 0.5 ml of 40 mg of Kenalog/ml and 0.5 ml of 1% lidocaine without epinephrine. The patient tolerated the procedure well. A band aid and triple antibiotic ointment were applied bilaterally. The ultrasound images were saved to the unit and will be transferred to the PACS system at a later date.

## 2023-06-14 ENCOUNTER — MED REC SCAN ONLY (OUTPATIENT)
Dept: INTERNAL MEDICINE CLINIC | Facility: CLINIC | Age: 68
End: 2023-06-14

## 2023-07-19 ENCOUNTER — TELEPHONE (OUTPATIENT)
Dept: INTERNAL MEDICINE CLINIC | Facility: CLINIC | Age: 68
End: 2023-07-19

## 2023-08-17 ENCOUNTER — APPOINTMENT (OUTPATIENT)
Dept: PHYSICAL THERAPY | Facility: HOSPITAL | Age: 68
End: 2023-08-17
Attending: INTERNAL MEDICINE
Payer: MEDICARE

## 2023-08-21 ENCOUNTER — OFFICE VISIT (OUTPATIENT)
Dept: PHYSICAL THERAPY | Facility: HOSPITAL | Age: 68
End: 2023-08-21
Attending: INTERNAL MEDICINE
Payer: MEDICARE

## 2023-08-21 PROCEDURE — 97140 MANUAL THERAPY 1/> REGIONS: CPT | Performed by: PHYSICAL THERAPIST

## 2023-08-21 RX ORDER — FLUTICASONE PROPIONATE 50 MCG
SPRAY, SUSPENSION (ML) NASAL
Qty: 48 G | Refills: 3 | Status: SHIPPED | OUTPATIENT
Start: 2023-08-21

## 2023-08-21 NOTE — TELEPHONE ENCOUNTER
Refill request is for a maintenance medication and has met the criteria specified in the Ambulatory Medication Refill Standing Order for eligibility, visits, laboratory, alerts and was sent to the requested pharmacy. Requested Prescriptions     Signed Prescriptions Disp Refills    fluticasone propionate 50 MCG/ACT Nasal Suspension 48 g 3     Sig: USE 2 Vikarna 12 Provider:  Gurpreet Guerra     Ordering User: Grisel Spring

## 2023-09-08 ENCOUNTER — TELEPHONE (OUTPATIENT)
Dept: INTERNAL MEDICINE CLINIC | Facility: CLINIC | Age: 68
End: 2023-09-08

## 2023-09-08 NOTE — TELEPHONE ENCOUNTER
Melchor calling regarding a peer to peer discussion on a  [employer].     Ref # [id number]

## 2023-09-11 ENCOUNTER — TELEPHONE (OUTPATIENT)
Dept: INTERNAL MEDICINE CLINIC | Facility: CLINIC | Age: 68
End: 2023-09-11

## 2023-09-11 ENCOUNTER — OFFICE VISIT (OUTPATIENT)
Dept: PHYSICAL THERAPY | Facility: HOSPITAL | Age: 68
End: 2023-09-11
Attending: INTERNAL MEDICINE
Payer: MEDICARE

## 2023-09-11 PROCEDURE — 97140 MANUAL THERAPY 1/> REGIONS: CPT

## 2023-09-11 NOTE — TELEPHONE ENCOUNTER
D/w Ned Valdez [phone]  Segmental compression device requested     -has device been tried  -are there measurements before and after    -need DME trial records and device;     Forwarded info to Ned Valdez

## 2023-09-15 ENCOUNTER — TELEPHONE (OUTPATIENT)
Dept: PHYSICAL THERAPY | Facility: HOSPITAL | Age: 68
End: 2023-09-15

## 2023-09-15 RX ORDER — PANTOPRAZOLE SODIUM 40 MG/1
40 TABLET, DELAYED RELEASE ORAL
Qty: 90 TABLET | Refills: 2 | OUTPATIENT
Start: 2023-09-15

## 2023-09-18 ENCOUNTER — OFFICE VISIT (OUTPATIENT)
Dept: PHYSICAL MEDICINE AND REHAB | Facility: CLINIC | Age: 68
End: 2023-09-18
Payer: MEDICARE

## 2023-09-18 DIAGNOSIS — M43.16 SPONDYLOLISTHESIS OF LUMBAR REGION: ICD-10-CM

## 2023-09-18 DIAGNOSIS — I89.0 LYMPHEDEMA OF RIGHT ARM: ICD-10-CM

## 2023-09-18 DIAGNOSIS — M51.26 LUMBAR HERNIATED DISC: ICD-10-CM

## 2023-09-18 DIAGNOSIS — M65.332 ACQUIRED TRIGGER FINGER OF LEFT MIDDLE FINGER: ICD-10-CM

## 2023-09-18 DIAGNOSIS — G62.9 PERIPHERAL POLYNEUROPATHY: Primary | ICD-10-CM

## 2023-09-18 DIAGNOSIS — M51.9 LUMBAR DISC DISEASE: ICD-10-CM

## 2023-09-18 DIAGNOSIS — G54.0 RADIATION-INDUCED BRACHIAL PLEXOPATHY: ICD-10-CM

## 2023-09-18 DIAGNOSIS — M65.331 TRIGGER MIDDLE FINGER OF RIGHT HAND: ICD-10-CM

## 2023-09-18 DIAGNOSIS — M54.16 LUMBAR RADICULOPATHY: ICD-10-CM

## 2023-09-18 DIAGNOSIS — M48.061 LUMBAR FORAMINAL STENOSIS: ICD-10-CM

## 2023-09-18 DIAGNOSIS — C50.911 PRIMARY CANCER OF RIGHT BREAST WITH STAGE 2 NODAL METASTASIS PER AMERICAN JOINT COMMITTEE ON CANCER 7TH EDITION (N2) (HCC): ICD-10-CM

## 2023-09-18 DIAGNOSIS — C77.9 PRIMARY CANCER OF RIGHT BREAST WITH STAGE 2 NODAL METASTASIS PER AMERICAN JOINT COMMITTEE ON CANCER 7TH EDITION (N2) (HCC): ICD-10-CM

## 2023-09-18 RX ORDER — PREDNISONE 10 MG/1
10 TABLET ORAL DAILY
Qty: 28 EACH | Refills: 0 | Status: SHIPPED | OUTPATIENT
Start: 2023-09-18

## 2023-09-22 ENCOUNTER — TELEPHONE (OUTPATIENT)
Dept: PHYSICAL MEDICINE AND REHAB | Facility: CLINIC | Age: 68
End: 2023-09-22

## 2023-09-25 ENCOUNTER — TELEPHONE (OUTPATIENT)
Dept: PHYSICAL MEDICINE AND REHAB | Facility: CLINIC | Age: 68
End: 2023-09-25

## 2023-09-25 RX ORDER — PREDNISONE 10 MG/1
10 TABLET ORAL DAILY
Qty: 28 EACH | Refills: 0 | Status: SHIPPED | OUTPATIENT
Start: 2023-09-25

## 2023-09-29 ENCOUNTER — OFFICE VISIT (OUTPATIENT)
Dept: PHYSICAL THERAPY | Facility: HOSPITAL | Age: 68
End: 2023-09-29
Attending: INTERNAL MEDICINE
Payer: MEDICARE

## 2023-09-29 PROCEDURE — 97140 MANUAL THERAPY 1/> REGIONS: CPT | Performed by: PHYSICAL THERAPIST

## 2023-09-29 RX ORDER — PREDNISONE 10 MG/1
10 TABLET ORAL DAILY
Qty: 28 EACH | Refills: 0 | Status: SHIPPED | OUTPATIENT
Start: 2023-09-29 | End: 2023-09-29

## 2023-09-29 NOTE — TELEPHONE ENCOUNTER
New prescription signed for at [facility] in [place]. Patient notified of new rx sent. Previous order has been cancelled w/ Astrid.
Please clarify quantity for predniSONE 10 MG (21) Oral Tablet Therapy Pack - placed in nurses bin.
Spent 12 mins on phone waiting for pharmacy and line disconnected. 2nd phone call lasted 19 mins on hold with the pharmacist. Anoop Senters with pharmacist who refused to give me name, rx is ready and patient notified.
Routine  care and anticipatory guidance

## 2023-10-09 ENCOUNTER — APPOINTMENT (OUTPATIENT)
Dept: HEMATOLOGY/ONCOLOGY | Facility: HOSPITAL | Age: 68
End: 2023-10-09
Attending: INTERNAL MEDICINE
Payer: MEDICARE

## 2023-11-03 ENCOUNTER — HOSPITAL ENCOUNTER (OUTPATIENT)
Dept: MRI IMAGING | Age: 68
Discharge: HOME | End: 2023-11-03
Attending: PHYSICAL MEDICINE & REHABILITATION
Payer: MEDICARE

## 2023-11-03 ENCOUNTER — TELEPHONE (OUTPATIENT)
Dept: INTERNAL MEDICINE CLINIC | Facility: CLINIC | Age: 68
End: 2023-11-03

## 2023-11-03 DIAGNOSIS — M54.16 LUMBAR RADICULOPATHY: ICD-10-CM

## 2023-11-03 DIAGNOSIS — Z17.0 MALIGNANT NEOPLASM OF RIGHT BREAST IN FEMALE, ESTROGEN RECEPTOR POSITIVE, UNSPECIFIED SITE OF BREAST: ICD-10-CM

## 2023-11-03 DIAGNOSIS — E78.00 HYPERCHOLESTEREMIA: ICD-10-CM

## 2023-11-03 DIAGNOSIS — M32.9 SYSTEMIC LUPUS ERYTHEMATOSUS, UNSPECIFIED SLE TYPE, UNSPECIFIED ORGAN INVOLVEMENT STATUS (HCC): ICD-10-CM

## 2023-11-03 DIAGNOSIS — G62.9 PERIPHERAL POLYNEUROPATHY: Primary | ICD-10-CM

## 2023-11-03 DIAGNOSIS — E55.9 VITAMIN D DEFICIENCY: ICD-10-CM

## 2023-11-03 DIAGNOSIS — E53.8 VITAMIN B12 DEFICIENCY: ICD-10-CM

## 2023-11-03 DIAGNOSIS — C50.911 MALIGNANT NEOPLASM OF RIGHT BREAST IN FEMALE, ESTROGEN RECEPTOR POSITIVE, UNSPECIFIED SITE OF BREAST: ICD-10-CM

## 2023-11-03 PROCEDURE — 72148 MRI LUMBAR SPINE W/O DYE: CPT | Performed by: PHYSICAL MEDICINE & REHABILITATION

## 2023-11-03 NOTE — TELEPHONE ENCOUNTER
Labs pended for [facility], urine culture and u/a still good until January , MD to review and sign if there is any other labs needed for patient to obtain.

## 2023-11-03 NOTE — TELEPHONE ENCOUNTER
Patient called to request lab orders [facility] [address] [place] [address]. Patient has appointment next Friday November 10th. Patient would like B12 order put in also. She states she is having trouble with Neuropathy. She will also do the UA that is in system. Please send Yurbuds message when complete.

## 2023-11-08 LAB
ABSOLUTE BASOPHILS: 41 CELLS/UL (ref 0–200)
ABSOLUTE EOSINOPHILS: 69 CELLS/UL (ref 15–500)
ABSOLUTE LYMPHOCYTES: 2201 CELLS/UL (ref 850–3900)
ABSOLUTE MONOCYTES: 469 CELLS/UL (ref 200–950)
ABSOLUTE NEUTROPHILS: 4119 CELLS/UL (ref 1500–7800)
ALBUMIN/GLOBULIN RATIO: 1.6 (CALC) (ref 1–2.5)
ALBUMIN: 4.2 G/DL (ref 3.6–5.1)
ALKALINE PHOSPHATASE: 62 U/L (ref 37–153)
ALT: 15 U/L (ref 6–29)
AST: 19 U/L (ref 10–35)
BASOPHILS: 0.6 %
BILIRUBIN, TOTAL: 0.3 MG/DL (ref 0.2–1.2)
BUN: 12 MG/DL (ref 7–25)
CALCIUM: 9.1 MG/DL (ref 8.6–10.4)
CARBON DIOXIDE: 32 MMOL/L (ref 20–32)
CHLORIDE: 101 MMOL/L (ref 98–110)
CHOL/HDLC RATIO: 3.2 (CALC)
CHOLESTEROL, TOTAL: 196 MG/DL
CREATININE: 0.69 MG/DL (ref 0.5–1.05)
EGFR: 96 ML/MIN/1.73M2
EOSINOPHILS: 1 %
GLOBULIN: 2.7 G/DL (CALC) (ref 1.9–3.7)
GLUCOSE: 93 MG/DL (ref 65–99)
HDL CHOLESTEROL: 62 MG/DL
HEMATOCRIT: 40.1 % (ref 35–45)
HEMOGLOBIN: 13.4 G/DL (ref 11.7–15.5)
LDL-CHOLESTEROL: 112 MG/DL (CALC)
LYMPHOCYTES: 31.9 %
MCH: 30.7 PG (ref 27–33)
MCHC: 33.4 G/DL (ref 32–36)
MCV: 91.8 FL (ref 80–100)
MONOCYTES: 6.8 %
MPV: 9.7 FL (ref 7.5–12.5)
NEUTROPHILS: 59.7 %
NON-HDL CHOLESTEROL: 134 MG/DL (CALC)
PLATELET COUNT: 362 THOUSAND/UL (ref 140–400)
POTASSIUM: 4.5 MMOL/L (ref 3.5–5.3)
PROTEIN, TOTAL: 6.9 G/DL (ref 6.1–8.1)
RDW: 12.1 % (ref 11–15)
RED BLOOD CELL COUNT: 4.37 MILLION/UL (ref 3.8–5.1)
SODIUM: 141 MMOL/L (ref 135–146)
TRIGLYCERIDES: 114 MG/DL
TSH W/REFLEX TO FT4: 2 MIU/L (ref 0.4–4.5)
VITAMIN B12: 452 PG/ML (ref 200–1100)
VITAMIN D, 25-OH, TOTAL: 58 NG/ML (ref 30–100)
WHITE BLOOD CELL COUNT: 6.9 THOUSAND/UL (ref 3.8–10.8)

## 2023-11-09 ENCOUNTER — OFFICE VISIT (OUTPATIENT)
Dept: INTERNAL MEDICINE CLINIC | Facility: CLINIC | Age: 68
End: 2023-11-09

## 2023-11-09 VITALS
OXYGEN SATURATION: 95 % | WEIGHT: 181 LBS | BODY MASS INDEX: 35.53 KG/M2 | SYSTOLIC BLOOD PRESSURE: 140 MMHG | HEART RATE: 104 BPM | DIASTOLIC BLOOD PRESSURE: 90 MMHG | HEIGHT: 60 IN

## 2023-11-09 DIAGNOSIS — G62.9 PERIPHERAL POLYNEUROPATHY: ICD-10-CM

## 2023-11-09 DIAGNOSIS — G89.29 CHRONIC NONINTRACTABLE HEADACHE, UNSPECIFIED HEADACHE TYPE: ICD-10-CM

## 2023-11-09 DIAGNOSIS — M81.8 OTHER OSTEOPOROSIS, UNSPECIFIED PATHOLOGICAL FRACTURE PRESENCE: ICD-10-CM

## 2023-11-09 DIAGNOSIS — Z87.440 HISTORY OF UTI: ICD-10-CM

## 2023-11-09 DIAGNOSIS — I89.0 LYMPHEDEMA OF RIGHT ARM: ICD-10-CM

## 2023-11-09 DIAGNOSIS — R51.9 CHRONIC NONINTRACTABLE HEADACHE, UNSPECIFIED HEADACHE TYPE: ICD-10-CM

## 2023-11-09 DIAGNOSIS — M47.816 LUMBAR SPONDYLOSIS: ICD-10-CM

## 2023-11-09 DIAGNOSIS — Z86.59 HISTORY OF DEPRESSION: ICD-10-CM

## 2023-11-09 DIAGNOSIS — M65.331 TRIGGER MIDDLE FINGER OF RIGHT HAND: ICD-10-CM

## 2023-11-09 DIAGNOSIS — E66.01 SEVERE OBESITY (BMI 35.0-39.9) WITH COMORBIDITY (HCC): ICD-10-CM

## 2023-11-09 DIAGNOSIS — E55.9 VITAMIN D DEFICIENCY: ICD-10-CM

## 2023-11-09 DIAGNOSIS — Z17.0 MALIGNANT NEOPLASM OF RIGHT BREAST IN FEMALE, ESTROGEN RECEPTOR POSITIVE, UNSPECIFIED SITE OF BREAST: ICD-10-CM

## 2023-11-09 DIAGNOSIS — D22.9 ATYPICAL NEVUS: ICD-10-CM

## 2023-11-09 DIAGNOSIS — E78.00 HYPERCHOLESTEREMIA: ICD-10-CM

## 2023-11-09 DIAGNOSIS — K21.9 GASTROESOPHAGEAL REFLUX DISEASE, UNSPECIFIED WHETHER ESOPHAGITIS PRESENT: ICD-10-CM

## 2023-11-09 DIAGNOSIS — Z00.00 PHYSICAL EXAM, ANNUAL: Primary | ICD-10-CM

## 2023-11-09 DIAGNOSIS — C50.911 MALIGNANT NEOPLASM OF RIGHT BREAST IN FEMALE, ESTROGEN RECEPTOR POSITIVE, UNSPECIFIED SITE OF BREAST: ICD-10-CM

## 2023-11-09 DIAGNOSIS — N28.1 KIDNEY CYSTS: ICD-10-CM

## 2023-11-09 DIAGNOSIS — M32.9 SYSTEMIC LUPUS ERYTHEMATOSUS, UNSPECIFIED SLE TYPE, UNSPECIFIED ORGAN INVOLVEMENT STATUS (HCC): ICD-10-CM

## 2023-11-09 DIAGNOSIS — K64.8 INTERNAL HEMORRHOID: ICD-10-CM

## 2023-11-17 ENCOUNTER — HOSPITAL ENCOUNTER (OUTPATIENT)
Dept: MAMMOGRAPHY | Age: 68
Discharge: HOME | End: 2023-11-17
Attending: INTERNAL MEDICINE
Payer: MEDICARE

## 2023-11-17 DIAGNOSIS — Z12.31 ENCOUNTER FOR SCREENING MAMMOGRAM FOR BREAST CANCER: ICD-10-CM

## 2023-11-17 PROCEDURE — 77067 SCR MAMMO BI INCL CAD: CPT | Performed by: INTERNAL MEDICINE

## 2023-11-17 PROCEDURE — 77063 BREAST TOMOSYNTHESIS BI: CPT | Performed by: INTERNAL MEDICINE

## 2023-11-18 ENCOUNTER — TELEPHONE (OUTPATIENT)
Dept: INTERNAL MEDICINE CLINIC | Facility: CLINIC | Age: 68
End: 2023-11-18

## 2023-11-18 NOTE — TELEPHONE ENCOUNTER
Refill called. She tested positive today for COVID. She started to have chills yesterday. She has had a cough and headache for a little over a week. She has a temperature of 100.7. No sore throat. Dry cough. No shortness of breath. We discussed it is little difficult to tell when her symptoms started but we both agreed that symptoms started yesterday or maybe the day before. Therefore I think she is within the 5 days of treatment with  Paxlovid. She wishes to take Paxlovid. She does have lupus. She does qualify. I discussed some of the side effects of bitter taste in the mouth or GI upset. Her GFR is normal.  No contraindications that I can tell from her medication list.  She is not on a statin. I asked her to call Monday with a progress report.     She verbalizes understanding

## 2023-12-05 PROBLEM — M51.369 ANNULAR TEAR OF LUMBAR DISC: Status: ACTIVE | Noted: 2023-12-05

## 2023-12-05 PROBLEM — M51.26 LUMBAR HERNIATED DISC: Status: RESOLVED | Noted: 2020-01-27 | Resolved: 2023-12-05

## 2023-12-05 PROBLEM — M51.36 ANNULAR TEAR OF LUMBAR DISC: Status: ACTIVE | Noted: 2023-12-05

## 2023-12-08 RX ORDER — NORTRIPTYLINE HYDROCHLORIDE 10 MG/1
20 CAPSULE ORAL NIGHTLY
Qty: 180 CAPSULE | Refills: 3 | Status: SHIPPED | OUTPATIENT
Start: 2023-12-08

## 2023-12-08 RX ORDER — GABAPENTIN 300 MG/1
CAPSULE ORAL
Qty: 450 CAPSULE | Refills: 3 | Status: SHIPPED | OUTPATIENT
Start: 2023-12-08

## 2023-12-08 NOTE — TELEPHONE ENCOUNTER
Refill request is for a maintenance medication and has met the criteria specified in the Ambulatory Medication Refill Standing Order for eligibility, visits, laboratory, alerts and was sent to the requested pharmacy. Requested Prescriptions     Signed Prescriptions Disp Refills    gabapentin 300 MG Oral Cap 450 capsule 3     Sig: TAKE 2 CAPSULES EVERY MORNING AND TAKE 3 CAPSULES EVERY EVENING     Authorizing Provider: Dieudonne Harper     Ordering User: Brian SANCHEZ    NORTRIPTYLINE 10 MG Oral Cap 180 capsule 3     Sig: TAKE 2 CAPSULES EVERY NIGHT     Authorizing Provider:  Dieudonne Harper     Ordering User: Louis Morfin

## 2024-01-25 ENCOUNTER — OFFICE VISIT (OUTPATIENT)
Dept: INTERNAL MEDICINE CLINIC | Facility: CLINIC | Age: 69
End: 2024-01-25
Payer: MEDICARE

## 2024-01-25 VITALS
HEART RATE: 81 BPM | SYSTOLIC BLOOD PRESSURE: 140 MMHG | TEMPERATURE: 97 F | HEIGHT: 60 IN | BODY MASS INDEX: 34.55 KG/M2 | OXYGEN SATURATION: 98 % | DIASTOLIC BLOOD PRESSURE: 90 MMHG | WEIGHT: 176 LBS

## 2024-01-25 DIAGNOSIS — G62.9 PERIPHERAL POLYNEUROPATHY: ICD-10-CM

## 2024-01-25 DIAGNOSIS — M32.9 SYSTEMIC LUPUS ERYTHEMATOSUS, UNSPECIFIED SLE TYPE, UNSPECIFIED ORGAN INVOLVEMENT STATUS (HCC): ICD-10-CM

## 2024-01-25 DIAGNOSIS — C50.911 MALIGNANT NEOPLASM OF RIGHT BREAST IN FEMALE, ESTROGEN RECEPTOR POSITIVE, UNSPECIFIED SITE OF BREAST  (HCC): ICD-10-CM

## 2024-01-25 DIAGNOSIS — R51.9 CHRONIC NONINTRACTABLE HEADACHE, UNSPECIFIED HEADACHE TYPE: ICD-10-CM

## 2024-01-25 DIAGNOSIS — G89.29 CHRONIC NONINTRACTABLE HEADACHE, UNSPECIFIED HEADACHE TYPE: ICD-10-CM

## 2024-01-25 DIAGNOSIS — N28.1 KIDNEY CYSTS: ICD-10-CM

## 2024-01-25 DIAGNOSIS — I89.0 LYMPHEDEMA OF RIGHT ARM: ICD-10-CM

## 2024-01-25 DIAGNOSIS — Z17.0 MALIGNANT NEOPLASM OF RIGHT BREAST IN FEMALE, ESTROGEN RECEPTOR POSITIVE, UNSPECIFIED SITE OF BREAST  (HCC): ICD-10-CM

## 2024-01-25 DIAGNOSIS — I10 BENIGN HYPERTENSION: Primary | ICD-10-CM

## 2024-01-25 PROCEDURE — 3077F SYST BP >= 140 MM HG: CPT | Performed by: INTERNAL MEDICINE

## 2024-01-25 PROCEDURE — 3080F DIAST BP >= 90 MM HG: CPT | Performed by: INTERNAL MEDICINE

## 2024-01-25 PROCEDURE — 1126F AMNT PAIN NOTED NONE PRSNT: CPT | Performed by: INTERNAL MEDICINE

## 2024-01-25 PROCEDURE — 3008F BODY MASS INDEX DOCD: CPT | Performed by: INTERNAL MEDICINE

## 2024-01-25 PROCEDURE — 1160F RVW MEDS BY RX/DR IN RCRD: CPT | Performed by: INTERNAL MEDICINE

## 2024-01-25 PROCEDURE — 1159F MED LIST DOCD IN RCRD: CPT | Performed by: INTERNAL MEDICINE

## 2024-01-25 PROCEDURE — 99214 OFFICE O/P EST MOD 30 MIN: CPT | Performed by: INTERNAL MEDICINE

## 2024-01-25 RX ORDER — LOSARTAN POTASSIUM 50 MG/1
50 TABLET ORAL DAILY
Qty: 30 TABLET | Refills: 5 | Status: SHIPPED | OUTPATIENT
Start: 2024-01-25

## 2024-01-25 NOTE — PROGRESS NOTES
Gemini Mendoza is a 68 year old female.    HPI:     Chief Complaint   Patient presents with    Blood Pressure     Pt here due to high blood pressure x 6 months        67 y/o F with SLE who reports increased BP over last several readings; SBP 150s;  no CP; no SOB; no headaches; no palpitation; she reports erythematous appearance to cheeks;          HISTORY:  Past Medical History:   Diagnosis Date    Asthma     Breast cancer (HCC) 11/2018    right breast    Esophageal reflux     Extrinsic asthma, unspecified     Fibromyalgia     Hiatal hernia with GERD     Insomnia     Osteopenia     Peripheral neuropathy     Raynaud's phenomenon     SLE (systemic lupus erythematosus) ([facility])     Dr Valadez    Stress fracture of the metatarsals     4th MT      Past Surgical History:   Procedure Laterality Date    ANKLE FRACTURE SURGERY Left     CHOLECYSTECTOMY  1998    laparoscopic     COLONOSCOPY  12/12/2014    ELECTROCARDIOGRAM, COMPLETE      Scanned to media tab - DOS 04-    HEMORRHOIDECTOMY      HYSTERECTOMY  1992    laparoscopic (endometriosis)    OSCAR BIOPSY STEREO NODULE 1 SITE RIGHT (CPT=19081)  09/26/2018    OSCAR LOCALIZATION WIRE 1 SITE RIGHT (CPT=19281)  1992    MASTECTOMY RIGHT  11/01/2018    OTHER SURGICAL HISTORY  11/01/2018    right breast mastecetomy    RADIATION RIGHT      12/31/18-2/2019      Family History   Problem Relation Age of Onset    Prostate Cancer Father 57        d.62 metastatic    Heart Attack Father         MI    Cancer Mother 27        thyroid; lung @ 63 (smoker)    Heart Disease Mother     Cancer Maternal Grandmother 45        cervical/uterine    Cancer Maternal Aunt 75        pancreatic ca; fmr smoker; d.75    Pancreatic Cancer Maternal Aunt 75    Cancer Sister 61        thyroid ca; sx only    Prostate Cancer Brother 49        surgery    Cancer Maternal Uncle 40        bladder ca, non-smoker    Cancer Maternal Grandfather 60        lymphoma    Cancer Paternal Grandfather         brain ca; dx on  autopsy; d.55    Other (drug addict) Son     Cancer Maternal Aunt 62        lung ca; smoker; d.62    Cancer Maternal Aunt 72        lung ca; smoker; d.72    Cancer Maternal Cousin Female 51        thyroid ca    Breast Cancer Maternal Cousin Female 70    Prostate Cancer Paternal Uncle 75        seeds    Breast Cancer Self 61      Social History:   Social History     Socioeconomic History    Marital status:     Number of children: 2   Tobacco Use    Smoking status: Never    Smokeless tobacco: Never   Vaping Use    Vaping Use: Never used   Substance and Sexual Activity    Alcohol use: No    Drug use: No   Other Topics Concern    Caffeine Concern Yes     Comment: Coffee 2 cups daily; Soda    Exercise Yes     Comment: walking   Social History Narrative    The patient does not use an assistive device..      The patient does not live in a home with stairs.        Medications (Active prior to today's visit):  Current Outpatient Medications   Medication Sig Dispense Refill    losartan 50 MG Oral Tab Take 1 tablet (50 mg total) by mouth daily. 30 tablet 5    gabapentin 300 MG Oral Cap TAKE 2 CAPSULES EVERY MORNING AND TAKE 3 CAPSULES EVERY EVENING 450 capsule 3    NORTRIPTYLINE 10 MG Oral Cap TAKE 2 CAPSULES EVERY NIGHT 180 capsule 3    fluticasone propionate 50 MCG/ACT Nasal Suspension USE 2 SPRAYS NASALLY EVERY DAY 48 g 3    LETROZOLE 2.5 MG Oral Tab TAKE 1 TABLET EVERY DAY 90 tablet 3    pantoprazole 40 MG Oral Tab EC Take 1 tablet (40 mg total) by mouth before breakfast. 90 tablet 2    magnesium oxide 400 MG Oral Tab Take 1 tablet (400 mg total) by mouth daily. 90 tablet 3    Hydroxychloroquine Sulfate (PLAQUENIL) 200 MG Oral Tab Take 1 tablet (200 mg total) by mouth daily.      [facility] VIT D 5000 HIGH-POTENCY 5000 UNIT OR CAPS 1 CAPSULE DAILY         Allergies:  Allergies   Allergen Reactions    Lymphazurin [Isosulfan Blue] ANAPHYLAXIS    Elavil [Amitriptyline Hcl] OTHER (SEE COMMENTS)     Altered mental state     Flexeril [Cyclobenzaprine Hcl] OTHER (SEE COMMENTS)     Altered mental state    Nabumetone      Other reaction(s): Anaphylaxis    Relafen ANAPHYLAXIS    Cephalexin NAUSEA ONLY                 ROS:   Constitutional: no weight loss; no fatigue  Cardiovascular:  Negative for chest pain; negative palpitations  Respiratory:  Negative for cough, dyspnea and wheezing  Gastrointestinal:  Negative for abdominal pain, constipation, decreased appetite, diarrhea and vomiting; no melena or hematochezia  All other review of systems are negative.        PHYSICAL EXAM:   Blood pressure 140/90, pulse 81, temperature 97 °F (36.1 °C), height 5' (1.524 m), weight 176 lb (79.8 kg), SpO2 98%.  Constitutional: alert and oriented x3 in no acute distress  HEENT- EOMI, PERRL  Nose/Mouth/Throat: pharynx without erythema; no oral lesions  Neck/Thyroid: neck supple; no thyromegaly  Cardiovascular: RRR, S1, S2, no S3 or murmur  Respiratory: lungs without crackles or wheezes  Abdomen: normoactive bowel sounds, soft, non-tender and non-distended  Extremities: no clubbing, cyanosis or edema           ASSESSMENT/PLAN:   HTN  -start losartan 50 mg po every day   -check BMP 2 weeks    Kidney cyst  Kidney U/S with indeterminate 1.4 cm nodule to inferior pole of left kidney; MRI abdomen in Feb 2020 showed a 1.1 cm left renal cortical lesion in the inferior pole demonstrates mild enhancement consistent with a small neoplasm; pt seen by urologist Dr Cartwright and advised for surveillance imaging; repeat CT abdomen in Sept 2020 showed stable solid 1.4 cm left lower pole renal mass with 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 This exhibits persistent mild intralesional enhancement, concerning for neoplasm, which could be benign (series  oncocytoma) or malignant. There has been no significant interval  change since 02/11/2020; MRI abdomen in Feb 2022 showed lesion along the inferior pole of the left kidney is decreased in size, measuring 0.7 x 0.7 cm; MRI abdomen in Feb 2023 showed stable findings; F/U Dr Cartwright in March 2024     History of UTI  Had dysuria on 4/3/21; seen in urgent care; was initially given Macrobid; UCx grew >100K Proteus mirabilis; then was switched to Cipro 500 mg po BID #14; she finished Abx on 4/12; had noticed recurrent +dysuria, +bladder spasms, +hematuria  -s/p amoxicillin 500 mg po TID #30     Breast cancer, right  Stage IIA [id number] IDC RUIQ s/p biopsy, ER 95/NE 90 Her2 neg, low oncotype Favorable Ki-67 8%, s/p right mastectomy and ALND with 1/18 LN positive, s/p adjuvant XRT on 2/11/2019 with Dr Griffin; currently on adjuvant hormonal blockade with letrozole 2.5 mg po qD under care of Dr Lazo; she does not plan to pursue breast reconstruction; left breast mammo in Oct 2022 was BiRads 2;      RUE lymphedema  Has been followed by physical therapist, Roxi Torres; uses compression sleeve  Uses Lymphapress every other day to decreased RUE swelling  -pt wishes to use Lymphapress sleeves to treat BLE lymphedema     SLE (systemic lupus erythematosus) (HCC)  on Plaquenil 200 mg po qD as directed by Dr Valadez; Rx per Dr Valadez--> F/U q 6 months; sees chiropractor; also goes to Dr Arteaga Daily at [facility] q 6 mos for Plaquenil eye monintoring     Chronic headache  on nortriptyline 10 mg two tabs (=20mg) po qHS     Peripheral neuropathy  on gabapentin 600 mg po qAM and 900 mg po qHS; Rx per neurologist Dr Parra, though pt no longer sees  -consider addition of Cymbalta if sxs refractory     GERD  on pantoprazole 40 mg op qD as heartburn has recurred; had EGD in Dec 2014 by Dr Encinas; also seen to have hiatal hernia     History of Depression  off fluoxetine 20 mg since Jan 2017; on no meds     Osteoporosis   DEXA in Sept  2018 shows T-score -1.5; Had DEXA scan in April 2017; was taking OTC calcium supplement; does not take bisphosphonates orally due to dyspepsia and hiatal hernia; undergoes Reclast IV annually in March at Dr Valadez's office; had one dose in Jan 2018, March 2019; did not go in March 2020; planned 5 years' duration; DEXA in Sept 2020 shows T-score -1.4 in left hip; DEXA in Oct 2022 shows T-score -1.8 in left femoral neck; CT abdomen in Sept 2020 shows mild chronic L3 superior endplate compression fracture.  -Reclast currently on hold; pt will address with Dr Hopper     Internal hemorrhoids  S/p banding by Dr Hatch in 2010-11; has occasional stool incontinence; s/p banding with Dr Casey in 2018     Obesity, BMI 30-35 with comorbidity  Body mass index is 35.35 kg/m².' advise exercise and weight loss     Lumbar spondylosis  MRI lumbar spine in Jan 2020 shows  L4-L5:  Mild left greater than lateral recess and mild left greater than right neural foraminal stenosis.  Mild progression since July, 2015.    L5-S1:  Moderate spinal canal stenosis, predominantly related to dorsal and ventral epidural lipomatosis.  No significant interval change since July, 2015.    L1-L2:  Degenerative disc disease, which is new or progressive since July, 2015.  No significant neural compromise.  -s/p LESI in Feb 2020; pt followed by Dr Ambrosio  -MRI L-spine n Nov 2023 showed Degenerative disc and facet disease throughout the lumbar spine, most prominent at L4-L5, where there is moderate narrowing of the canal and bilateral neural foramen.  This has minimally progressed since 1/11/2020.   -F/U Dr Ambrosio     Trigger finger   Of right 3rd finger; s/p injection in Sept 2020 by Dr Ambrosio     hypercholesterolemia   in Nov 2023     Vitamin D deficiency  VItamin D level 58 in Nov 2023; on Vitamin D 5000 units daily     Atypical nevus  To left trunk; pt referred to dermatologist Dr Bowens    [facility]  S/p unilateral oophorectomy/  hysterectomy in 1992, so PAP not indicated; Had colonoscopy in Dec 2014 with GI Dr Encinas (next colonoscopy due in 10 years, or Dec 2024); Prevnar 20 given Dec 2022     RTC 1 month  MWE 11/9/23     Spent 30 minutes obtaining history, evaluating patient, discussing treatment options, diet, exercise, review of available labs and radiology reports, and completing documentation.             Orders This Visit:  Orders Placed This Encounter   Procedures    BASIC METABOLIC PANEL [47546] [Q]       Meds This Visit:  Requested Prescriptions     Signed Prescriptions Disp Refills    losartan 50 MG Oral Tab 30 tablet 5     Sig: Take 1 tablet (50 mg total) by mouth daily.       Imaging & Referrals:  None     1/25/2024  Garrick Croft MD

## 2024-02-01 ENCOUNTER — TELEPHONE (OUTPATIENT)
Dept: INTERNAL MEDICINE CLINIC | Facility: CLINIC | Age: 69
End: 2024-02-01

## 2024-02-01 DIAGNOSIS — Z87.440 HISTORY OF UTI: Primary | ICD-10-CM

## 2024-02-01 NOTE — TELEPHONE ENCOUNTER
UA with 1 + blood ; WBC 6-10; UCX >100K E Coli    Imp- UTI    Rec- Cipro 250 mg po BID #14    ERx sent ; pt called

## 2024-02-01 NOTE — TELEPHONE ENCOUNTER
To Dr. VALDEZ     Please advise    Patient started with symptoms this morning, has not taken any OTC meds. Stated she will try to get a UA tomorrow as she feels she may not make it to the lab today due to her urgency.      Uses [facility] in [place]     UTI Symptoms:  [x ]Frequency  [x ]Urgency  [ ]Pain/burning  [ ]Blood in urine  [ ]Low back pain  [ x]Flank pain  [ ]Fevers  [ ]Chills  [ ]Night sweats  [ ]Odor  [ ]Confusion  [ ]Bladder distention  [ x]Feeling that bladder isn't empty after urinating  [ ]Cloudy urine

## 2024-02-01 NOTE — TELEPHONE ENCOUNTER
Patient calling for prescription for UTI    Symptoms started this morning.  Frequency and painful.    No burning or odor.     David Mendiola.    Best call back number [phone]

## 2024-02-05 LAB
APPEARANCE: CLEAR
BILIRUBIN: NEGATIVE
COLOR: YELLOW
GLUCOSE: NEGATIVE
KETONES: NEGATIVE
NITRITE: NEGATIVE
PH: 7 (ref 5–8)
PROTEIN: NEGATIVE
SPECIFIC GRAVITY: 1.01 (ref 1–1.03)

## 2024-02-05 RX ORDER — CIPROFLOXACIN 250 MG/1
250 TABLET, FILM COATED ORAL 2 TIMES DAILY
Qty: 14 TABLET | Refills: 0 | Status: SHIPPED | OUTPATIENT
Start: 2024-02-05

## 2024-02-09 LAB
BUN: 11 MG/DL (ref 7–25)
CALCIUM: 9.3 MG/DL (ref 8.6–10.4)
CARBON DIOXIDE: 31 MMOL/L (ref 20–32)
CHLORIDE: 100 MMOL/L (ref 98–110)
CREATININE: 0.7 MG/DL (ref 0.5–1.05)
EGFR: 95 ML/MIN/1.73M2
GLUCOSE: 97 MG/DL (ref 65–99)
POTASSIUM: 4.8 MMOL/L (ref 3.5–5.3)
SODIUM: 140 MMOL/L (ref 135–146)

## 2024-02-22 ENCOUNTER — OFFICE VISIT (OUTPATIENT)
Dept: INTERNAL MEDICINE CLINIC | Facility: CLINIC | Age: 69
End: 2024-02-22

## 2024-02-22 VITALS
HEART RATE: 77 BPM | HEIGHT: 61 IN | OXYGEN SATURATION: 96 % | SYSTOLIC BLOOD PRESSURE: 160 MMHG | BODY MASS INDEX: 33.54 KG/M2 | WEIGHT: 177.63 LBS | DIASTOLIC BLOOD PRESSURE: 102 MMHG | TEMPERATURE: 97 F

## 2024-02-22 DIAGNOSIS — I10 BENIGN HYPERTENSION: Primary | ICD-10-CM

## 2024-02-22 PROCEDURE — 1160F RVW MEDS BY RX/DR IN RCRD: CPT | Performed by: INTERNAL MEDICINE

## 2024-02-22 PROCEDURE — 3077F SYST BP >= 140 MM HG: CPT | Performed by: INTERNAL MEDICINE

## 2024-02-22 PROCEDURE — 3080F DIAST BP >= 90 MM HG: CPT | Performed by: INTERNAL MEDICINE

## 2024-02-22 PROCEDURE — 1126F AMNT PAIN NOTED NONE PRSNT: CPT | Performed by: INTERNAL MEDICINE

## 2024-02-22 PROCEDURE — 1159F MED LIST DOCD IN RCRD: CPT | Performed by: INTERNAL MEDICINE

## 2024-02-22 PROCEDURE — 99213 OFFICE O/P EST LOW 20 MIN: CPT | Performed by: INTERNAL MEDICINE

## 2024-02-22 PROCEDURE — 3008F BODY MASS INDEX DOCD: CPT | Performed by: INTERNAL MEDICINE

## 2024-02-22 RX ORDER — AMLODIPINE BESYLATE 5 MG/1
5 TABLET ORAL DAILY
Qty: 30 TABLET | Refills: 11 | Status: SHIPPED | OUTPATIENT
Start: 2024-02-22

## 2024-02-22 NOTE — PROGRESS NOTES
Gemini Mendoza is a 68 year old female.    HPI:     Chief Complaint   Patient presents with    Checkup     1 month follow up - high BP       69 y/o F with HTN here for F/U; SBP 160s on losartan 50 mg po every day;  no CP; no SOB; no headaches; no palpitation        HISTORY:  Past Medical History:   Diagnosis Date    Asthma (HCC)     Breast cancer (HCC) 11/2018    right breast    Esophageal reflux     Extrinsic asthma, unspecified     Fibromyalgia     Hiatal hernia with GERD     Insomnia     Osteopenia     Peripheral neuropathy     Raynaud's phenomenon     SLE (systemic lupus erythematosus) (HCC)     Dr Valadez    Stress fracture of the metatarsals     4th MT      Past Surgical History:   Procedure Laterality Date    ANKLE FRACTURE SURGERY Left     CHOLECYSTECTOMY  1998    laparoscopic     COLONOSCOPY  12/12/2014    ELECTROCARDIOGRAM, COMPLETE      Scanned to media tab - DOS 04-    HEMORRHOIDECTOMY      HYSTERECTOMY  1992    laparoscopic (endometriosis)    OSCAR BIOPSY STEREO NODULE 1 SITE RIGHT (CPT=19081)  09/26/2018    OSCAR LOCALIZATION WIRE 1 SITE RIGHT (CPT=19281)  1992    MASTECTOMY RIGHT  11/01/2018    OTHER SURGICAL HISTORY  11/01/2018    right breast mastecetomy    RADIATION RIGHT      12/31/18-2/2019      Family History   Problem Relation Age of Onset    Prostate Cancer Father 57        d.62 metastatic    Heart Attack Father         MI    Cancer Mother 27        thyroid; lung @ 63 (smoker)    Heart Disease Mother     Cancer Maternal Grandmother 45        cervical/uterine    Cancer Maternal Aunt 75        pancreatic ca; fmr smoker; d.75    Pancreatic Cancer Maternal Aunt 75    Cancer Sister 61        thyroid ca; sx only    Prostate Cancer Brother 49        surgery    Cancer Maternal Uncle 40        bladder ca, non-smoker    Cancer Maternal Grandfather 60        lymphoma    Cancer Paternal Grandfather         brain ca; dx on autopsy; d.55    Other (drug addict) Son     Cancer Maternal Aunt 62         lung ca; smoker; d.62    Cancer Maternal Aunt 72        lung ca; smoker; d.72    Cancer Maternal Cousin Female 51        thyroid ca    Breast Cancer Maternal Cousin Female 70    Prostate Cancer Paternal Uncle 75        seeds    Breast Cancer Self 63      Social History:   Social History     Socioeconomic History    Marital status:     Number of children: 2   Tobacco Use    Smoking status: Never    Smokeless tobacco: Never   Vaping Use    Vaping Use: Never used   Substance and Sexual Activity    Alcohol use: No    Drug use: No   Other Topics Concern    Caffeine Concern Yes     Comment: Coffee 2 cups daily; Soda    Exercise Yes     Comment: walking   Social History Narrative    The patient does not use an assistive device..      The patient does not live in a home with stairs.        Medications (Active prior to today's visit):  Current Outpatient Medications   Medication Sig Dispense Refill    losartan 50 MG Oral Tab Take 1 tablet (50 mg total) by mouth daily. 30 tablet 5    gabapentin 300 MG Oral Cap TAKE 2 CAPSULES EVERY MORNING AND TAKE 3 CAPSULES EVERY EVENING 450 capsule 3    NORTRIPTYLINE 10 MG Oral Cap TAKE 2 CAPSULES EVERY NIGHT 180 capsule 3    fluticasone propionate 50 MCG/ACT Nasal Suspension USE 2 SPRAYS NASALLY EVERY DAY 48 g 3    LETROZOLE 2.5 MG Oral Tab TAKE 1 TABLET EVERY DAY 90 tablet 3    pantoprazole 40 MG Oral Tab EC Take 1 tablet (40 mg total) by mouth before breakfast. 90 tablet 2    magnesium oxide 400 MG Oral Tab Take 1 tablet (400 mg total) by mouth daily. 90 tablet 3    Hydroxychloroquine Sulfate (PLAQUENIL) 200 MG Oral Tab Take 1 tablet (200 mg total) by mouth daily.      [facility] VIT D 5000 HIGH-POTENCY 5000 UNIT OR CAPS 1 CAPSULE DAILY      ciprofloxacin 250 MG Oral Tab Take 1 tablet (250 mg total) by mouth 2 (two) times daily. 14 tablet 0       Allergies:  Allergies   Allergen Reactions    Lymphazurin [Isosulfan Blue] ANAPHYLAXIS    Elavil [Amitriptyline Hcl] OTHER (SEE COMMENTS)      Altered mental state    Flexeril [Cyclobenzaprine Hcl] OTHER (SEE COMMENTS)     Altered mental state    Nabumetone      Other reaction(s): Anaphylaxis    Relafen ANAPHYLAXIS    Cephalexin NAUSEA ONLY                 ROS:   Constitutional: no weight loss; no fatigue  Cardiovascular:  Negative for chest pain; negative palpitations  Respiratory:  Negative for cough, dyspnea and wheezing  Gastrointestinal:  Negative for abdominal pain, constipation, decreased appetite, diarrhea and vomiting; no melena or hematochezia  All other review of systems are negative.        PHYSICAL EXAM:   Blood pressure (!) 160/102, pulse 77, temperature 96.5 °F (35.8 °C), height 5' 1\" (1.549 m), weight 177 lb 9.6 oz (80.6 kg), SpO2 96%.  Constitutional: alert and oriented x3 in no acute distress  HEENT- EOMI, PERRL  Nose/Mouth/Throat: pharynx without erythema; no oral lesions  Neck/Thyroid: neck supple; no thyromegaly  Cardiovascular: RRR, S1, S2, no S3 or murmur  Respiratory: lungs without crackles or wheezes  Abdomen: normoactive bowel sounds, soft, non-tender and non-distended  Extremities: no clubbing, cyanosis or edema           ASSESSMENT/PLAN:   HTN  -SBP 160s on losartan 50 mg po every day   -stop losartan  -start amlodipine 5 mg po qD     Kidney cyst  Kidney U/S with indeterminate 1.4 cm nodule to inferior pole of left kidney; MRI abdomen in Feb 2020 showed a 1.1 cm left renal cortical lesion in the inferior pole demonstrates mild enhancement consistent with a small neoplasm; pt seen by urologist Dr Cartwright and advised for surveillance imaging; repeat CT abdomen in Sept 2020 showed stable solid 1.4 cm left lower pole renal mass with 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 This exhibits persistent mild intralesional  enhancement, concerning for neoplasm, which could be benign (series oncocytoma) or malignant. There has been no significant interval  change since 02/11/2020; MRI abdomen in Feb 2022 showed lesion along the inferior pole of the left kidney is decreased in size, measuring 0.7 x 0.7 cm; MRI abdomen in Feb 2023 showed stable findings; F/U Dr Cartwright in March 2024     History of UTI  Had dysuria on 4/3/21; seen in urgent care; was initially given Macrobid; UCx grew >100K Proteus mirabilis; then was switched to Cipro 500 mg po BID #14; she finished Abx on 4/12; had noticed recurrent +dysuria, +bladder spasms, +hematuria  -s/p amoxicillin 500 mg po TID #30     Breast cancer, right  Stage IIA [id number] IDC RUIQ s/p biopsy, ER 95/MD 90 Her2 neg, low oncotype Favorable Ki-67 8%, s/p right mastectomy and ALND with 1/18 LN positive, s/p adjuvant XRT on 2/11/2019 with Dr Griffin; currently on adjuvant hormonal blockade with letrozole 2.5 mg po qD under care of Dr Lazo; she does not plan to pursue breast reconstruction; left breast mammo in Nov 2023 was BiRads 2;      RUE lymphedema  Has been followed by physical therapist, Roxi Torres; uses compression sleeve  Uses Lymphapress every other day to decreased RUE swelling  -pt wishes to use Lymphapress sleeves to treat BLE lymphedema     SLE (systemic lupus erythematosus) (HCC)  on Plaquenil 200 mg po qD as directed by Dr Valadez; Rx per Dr Valadez--> F/U q 6 months; sees chiropractor; also goes to Dr Arteaga Daily at [facility] q 6 mos for Plaquenil eye monintoring     Chronic headache  on nortriptyline 10 mg two tabs (=20mg) po qHS     Peripheral neuropathy  on gabapentin 600 mg po qAM and 900 mg po qHS; Rx per neurologist Dr Parra, though pt no longer sees  -consider addition of Cymbalta if sxs refractory     GERD  on pantoprazole 40 mg op qD as heartburn has recurred; had EGD in Dec 2014 by Dr Encinas; also seen to have hiatal hernia     History of Depression  off  fluoxetine 20 mg since Jan 2017; on no meds     Osteoporosis   DEXA in Sept 2018 shows T-score -1.5; Had DEXA scan in April 2017; was taking OTC calcium supplement; does not take bisphosphonates orally due to dyspepsia and hiatal hernia; undergoes Reclast IV annually in March at Dr Valadez's office; had one dose in Jan 2018, March 2019; did not go in March 2020; planned 5 years' duration; DEXA in Sept 2020 shows T-score -1.4 in left hip; DEXA in Oct 2022 shows T-score -1.8 in left femoral neck; CT abdomen in Sept 2020 shows mild chronic L3 superior endplate compression fracture.  -Reclast currently on hold; pt will address with Dr Hopper     Internal hemorrhoids  S/p banding by Dr Hatch in 2010-11; has occasional stool incontinence; s/p banding with Dr Casey in 2018     Obesity, BMI 30-35 with comorbidity  Body mass index is 35.35 kg/m².' advise exercise and weight loss     Lumbar spondylosis  MRI lumbar spine in Jan 2020 shows  L4-L5:  Mild left greater than lateral recess and mild left greater than right neural foraminal stenosis.  Mild progression since July, 2015.    L5-S1:  Moderate spinal canal stenosis, predominantly related to dorsal and ventral epidural lipomatosis.  No significant interval change since July, 2015.    L1-L2:  Degenerative disc disease, which is new or progressive since July, 2015.  No significant neural compromise.  -s/p LESI in Feb 2020; pt followed by Dr Ambrosio  -MRI L-spine n Nov 2023 showed Degenerative disc and facet disease throughout the lumbar spine, most prominent at L4-L5, where there is moderate narrowing of the canal and bilateral neural foramen.  This has minimally progressed since 1/11/2020.   -F/U Dr Ambrosio     Trigger finger   Of right 3rd finger; s/p injection in Sept 2020 by Dr Ambrosio     hypercholesterolemia   in Nov 2023     Vitamin D deficiency  VItamin D level 58 in Nov 2023; on Vitamin D 5000 units daily     Atypical nevus  To left trunk; pt referred to  dermatologist Dr Bowens     [facility]  S/p unilateral oophorectomy/ hysterectomy in 1992, so PAP not indicated; Had colonoscopy in Dec 2014 with GI Dr Encinas (next colonoscopy due in 10 years, or Dec 2024); Prevnar 20 given Dec 2022     RTC 1 month  MWE 11/9/23     Spent 30 minutes obtaining history, evaluating patient, discussing treatment options, diet, exercise, review of available labs and radiology reports, and completing documentation.             Orders This Visit:  No orders of the defined types were placed in this encounter.      Meds This Visit:  Requested Prescriptions      No prescriptions requested or ordered in this encounter       Imaging & Referrals:  None     2/22/2024  Garrick Croft MD

## 2024-02-23 ENCOUNTER — HOSPITAL ENCOUNTER (OUTPATIENT)
Dept: MRI IMAGING | Facility: HOSPITAL | Age: 69
Discharge: HOME | End: 2024-02-23
Attending: UROLOGY
Payer: MEDICARE

## 2024-02-23 DIAGNOSIS — N28.89 LEFT RENAL MASS: ICD-10-CM

## 2024-02-23 PROCEDURE — A9575 INJ GADOTERATE MEGLUMI 0.1ML: HCPCS | Performed by: UROLOGY

## 2024-02-23 PROCEDURE — 74183 MRI ABD W/O CNTR FLWD CNTR: CPT | Performed by: UROLOGY

## 2024-02-23 RX ORDER — GADOTERATE MEGLUMINE 376.9 MG/ML
20 INJECTION INTRAVENOUS
Status: COMPLETED | OUTPATIENT
Start: 2024-02-23 | End: 2024-02-23

## 2024-02-23 RX ADMIN — GADOTERATE MEGLUMINE 16 ML: 376.9 INJECTION INTRAVENOUS at 10:41:00

## 2024-02-28 ENCOUNTER — OFFICE VISIT (OUTPATIENT)
Dept: SURGERY | Facility: CLINIC | Age: 69
End: 2024-02-28
Payer: MEDICARE

## 2024-02-28 DIAGNOSIS — N28.89 LEFT RENAL MASS: Primary | ICD-10-CM

## 2024-02-28 PROCEDURE — 1159F MED LIST DOCD IN RCRD: CPT | Performed by: UROLOGY

## 2024-02-28 PROCEDURE — 99213 OFFICE O/P EST LOW 20 MIN: CPT | Performed by: UROLOGY

## 2024-02-28 NOTE — PROGRESS NOTES
[facility]rology  Follow-Up Visit    HPI: Gemini Mendoza is a 68 year old female presents for a follow up visit. Patient was last seen on 3/20/2023    INTERVAL HISTORY: Here for follow-up of small left renal mass incidentally detected 1/2020, managed with surveillance.     Denies flank pain, gross hematuria, or unintentional weight loss.     Recent MRI abdomen with and without IV contrast from 2/23/24 revealed a stable small hypoenhancing solid left lower pole 1 cm renal lesion.      1. Small Left Renal Mass (cT1a Nx Mx)  Incidental 1.4 cm left lower pole renal cortical lesion detected on L-spine MRI in 01/2020, which appears new or increased in size since July 2015.  This was favored to reflect a benign proteinaceous/hemorrhagic cyst however renal ultrasound correlation is recommended.     Renal ultrasound was obtained with findings of a small 1.4 cm left renal cortical lesion in the inferior pole which is difficult to visualize sonographically as it is relatively isoechoic to the adjacent renal parenchyma.  MRI recommended for further evaluation.     MRI of the abdomen was obtained in 02/2020 which revealed an 11 x 11 x 9 mm left renal cortical mass which demonstrates mild enhancement consistent with a small neoplasm.  No lymphadenopathy.     Patient has history of right breast cancer diagnosed and treated in 2018 with mastectomy and adjuvant radiation. Currently she is on an aromatase inhibitor without evidence of disease recurrence.     Patient denies gross hematuria, flank pain, unintentional weight loss, or change in appetite.     Multiple previous urinalyses reviewed which did not reveal any significant evidence of microscopic hematuria.    Elected active surveillance.     - 9/2020 F/U: surveillance CT abdomen with a stable 1.4 cm left lower pole renal mass.     - 3/2021 F/U: surveillance MRI abdomen with a stable 1 cm left lower pole renal mass.     - 3/2022 F/U: surveillance MRI abdomen revealed decrease in  size of the previously noted 1 cm solid-appearing lesion along the inferior pole of left kidney, now measuring 0.7 x 0.7 cm, with somewhat wedge-shaped appearance, perhaps representing interval rupture of a complex cyst.  This lesion appears without enhancement.    - 3/2023 F/U: MRI revealing continued decrease in size of left lower pole renal lesion, now 0.6 x 0.9 cm.  No significant enhancement noted.  Plan to continue monitoring for 1 more year.    - 2/2024 F/U: MRI revealing a small stable 1 cm hypoenhancing left renal lesion.        SOCIAL HISTORY: Patient is  and has 2 children.  She denies ever smoking.  She does not drink alcohol.  She is a retired .  She does not do drugs.     FAMILY HISTORY: Patient has a strong family history of prostate cancer in her father, brother, and paternal uncle.  She has a maternal uncle with history of bladder cancer.  Multiple cousins and aunts with thyroid, breast, lung cancer and lymphoma.    Reviewed past medical, surgical, family, and social history.  Reviewed med list and allergies.      REVIEW OF SYSTEMS:  Pertinent positives and negatives per HPI. A 10-point ROS was performed and is otherwise negative.       EXAM:  There were no vitals taken for this visit.     Physical Exam    Constitutional: She is oriented to person, place, and time. She appears well-developed and well-nourished. No distress.   HENT:   Head: Normocephalic.   Eyes: No scleral icterus.   Cardiovascular:  Normal rate.            Pulmonary/Chest: Effort normal.   Neurological: She is alert and oriented to person, place, and time.   Skin: Skin is warm and dry.   Psychiatric: She has a normal mood and affect.       PATHOLOGY:  No results found.       LABS:  No results found.       IMAGING:    MRI ABDOMEN (W+WO) (CPT=74183)    Result Date: 2/23/2024  CONCLUSION:  1. Stable small hypoenhancing solid left lower pole 1 cm renal lesion.  Continued follow-up recommended with repeat MRI in 1  year. 2. Moderate hepatic steatosis without significant change. 3. Stable small benign hypervascular right hepatic lesion. 4. Stable benign renal and hepatic cysts.    Dictated by (CST): Mike Valente MD on 2/23/2024 at 11:36 AM     Finalized by (CST): Mike Valente MD on 2/23/2024 at 12:07 PM                IMPRESSION:  68 year old female with an incidental clinical T1a solid left renal mass measuring less than 2 cm in size, incidentally noted 1/2020.      On active surveillance, now for 4 years with interval imaging revealing continued interval decrease in size of this lesion and no/minimal perceptible enhancement.    Discussed results with patient.  Reviewed that these results are pretty much reassuring.    Given recent MRI results and findings over the course of the past 4 years, I believe that at this point it would be relatively safe to discontinue further surveillance of this lesion.    Patient verbalized understanding.  She is very able.  All questions answered.        PLAN:  1.  Discontinue further surveillance of small left renal mass which appears to be decreasing in size and no longer enhancing.      RTC for follow-up as needed      David Cartwright MD  2/28/2024

## 2024-02-29 RX ORDER — LETROZOLE 2.5 MG/1
2.5 TABLET, FILM COATED ORAL DAILY
Qty: 30 TABLET | Refills: 0 | Status: SHIPPED | OUTPATIENT
Start: 2024-02-29 | End: 2024-03-05

## 2024-03-05 ENCOUNTER — OFFICE VISIT (OUTPATIENT)
Dept: HEMATOLOGY/ONCOLOGY | Facility: HOSPITAL | Age: 69
End: 2024-03-05
Attending: INTERNAL MEDICINE
Payer: MEDICARE

## 2024-03-05 VITALS
HEIGHT: 61 IN | TEMPERATURE: 97 F | OXYGEN SATURATION: 97 % | HEART RATE: 87 BPM | BODY MASS INDEX: 33.23 KG/M2 | SYSTOLIC BLOOD PRESSURE: 140 MMHG | DIASTOLIC BLOOD PRESSURE: 74 MMHG | WEIGHT: 176 LBS | RESPIRATION RATE: 18 BRPM

## 2024-03-05 DIAGNOSIS — Z78.0 MENOPAUSE: ICD-10-CM

## 2024-03-05 DIAGNOSIS — Z12.31 ENCOUNTER FOR SCREENING MAMMOGRAM FOR BREAST CANCER: ICD-10-CM

## 2024-03-05 DIAGNOSIS — Z51.81 ENCOUNTER FOR MONITORING ADJUVANT HORMONAL THERAPY: ICD-10-CM

## 2024-03-05 DIAGNOSIS — Z79.899 ENCOUNTER FOR MONITORING ADJUVANT HORMONAL THERAPY: ICD-10-CM

## 2024-03-05 DIAGNOSIS — Z92.3 HISTORY OF RADIATION THERAPY: Primary | ICD-10-CM

## 2024-03-05 PROCEDURE — 99214 OFFICE O/P EST MOD 30 MIN: CPT | Performed by: INTERNAL MEDICINE

## 2024-03-05 RX ORDER — LETROZOLE 2.5 MG/1
2.5 TABLET, FILM COATED ORAL DAILY
Qty: 90 TABLET | Refills: 3 | Status: SHIPPED | OUTPATIENT
Start: 2024-03-05

## 2024-03-05 NOTE — PROGRESS NOTES
Cancer Center Progress Note    Patient Name: Gemini Mendoza   YOB: 1955   Medical Record Number: [id number]   Attending Physician: Fransisco Lazo M.D.     Chief Complaint:  Breast cancer    History of Present Illness:  Cancer history:  68 year old   status post right mastectomy and axillary lymph node dissection 11/1/2018 for hormone receptor positive HER-2 negative stage IIb (PT2N1) grade 2 infiltrating ductal carcinoma.  Oncotype recurrence score was 11.  One sentinel lymph node was positive.  On axillary lymph node dissection 0 of 16 lymph nodes were involved.  She completed adjuvant radiotherapy in February 2019    Patient has an underlying history of SLE    She has history of osteopenia/osteoporosis    Interval History:  Returns for routine follow-up on hormonal therapy with anastrozole .  She has some hot flashes on aromatase inhibitor    She denies any new breast masses.  She denies any fevers or chills.  She denies any adenopathy    Performance Status:  ECOG 0  Past Medical History:  Past Medical History:   Diagnosis Date    Asthma (HCC)     Breast cancer (HCC) 11/2018    right breast    Esophageal reflux     Extrinsic asthma, unspecified     Fibromyalgia     Hiatal hernia with GERD     Insomnia     Osteopenia     Peripheral neuropathy     Raynaud's phenomenon     SLE (systemic lupus erythematosus) (HCC)     Dr Valadez    Stress fracture of the metatarsals     4th MT       Past Surgical History:  Past Surgical History:   Procedure Laterality Date    ANKLE FRACTURE SURGERY Left     CHOLECYSTECTOMY  1998    laparoscopic     COLONOSCOPY  12/12/2014    ELECTROCARDIOGRAM, COMPLETE      Scanned to media tab - DOS 04-    HEMORRHOIDECTOMY      HYSTERECTOMY  1992    laparoscopic (endometriosis)    OSCAR BIOPSY STEREO NODULE 1 SITE RIGHT (CPT=19081)  09/26/2018    OSCAR LOCALIZATION WIRE 1 SITE RIGHT (CPT=19281)  1992    MASTECTOMY RIGHT  11/01/2018    OTHER SURGICAL HISTORY  11/01/2018    right  breast mastecetomy    RADIATION RIGHT      12/31/18-2/2019       Family History:  Family History   Problem Relation Age of Onset    Prostate Cancer Father 57        d.62 metastatic    Heart Attack Father         MI    Cancer Mother 27        thyroid; lung @ 63 (smoker)    Heart Disease Mother     Cancer Maternal Grandmother 45        cervical/uterine    Cancer Maternal Aunt 75        pancreatic ca; fmr smoker; d.75    Pancreatic Cancer Maternal Aunt 75    Cancer Sister 61        thyroid ca; sx only    Prostate Cancer Brother 49        surgery    Cancer Maternal Uncle 40        bladder ca, non-smoker    Cancer Maternal Grandfather 60        lymphoma    Cancer Paternal Grandfather         brain ca; dx on autopsy; d.55    Other (drug addict) Son     Cancer Maternal Aunt 62        lung ca; smoker; d.62    Cancer Maternal Aunt 72        lung ca; smoker; d.72    Cancer Maternal Cousin Female 51        thyroid ca    Breast Cancer Maternal Cousin Female 70    Prostate Cancer Paternal Uncle 75        seeds    Breast Cancer Self 63       Social History:  Social History     Socioeconomic History    Marital status:      Spouse name: Not on file    Number of children: 2    Years of education: Not on file    Highest education level: Not on file   Occupational History    Not on file   Tobacco Use    Smoking status: Never    Smokeless tobacco: Never   Vaping Use    Vaping Use: Never used   Substance and Sexual Activity    Alcohol use: No    Drug use: No    Sexual activity: Not on file   Other Topics Concern     Service Not Asked    Blood Transfusions Not Asked    Caffeine Concern Yes     Comment: Coffee 2 cups daily; Soda    Occupational Exposure Not Asked    Hobby Hazards Not Asked    Sleep Concern Not Asked    Stress Concern Not Asked    Weight Concern Not Asked    Special Diet Not Asked    Back Care Not Asked    Exercise Yes     Comment: walking    Bike Helmet Not Asked    Seat Belt Not Asked    Self-Exams Not  Asked   Social History Narrative    The patient does not use an assistive device..      The patient does not live in a home with stairs.     Social Determinants of Health     Financial Resource Strain: Not on file   Food Insecurity: Not on file   Transportation Needs: Not on file   Physical Activity: Not on file   Stress: Not on file   Social Connections: Not on file   Housing Stability: Not on file       Current Medications:    Current Outpatient Medications:     letrozole 2.5 MG Oral Tab, Take 1 tablet (2.5 mg total) by mouth daily., Disp: 30 tablet, Rfl: 0    amLODIPine 5 MG Oral Tab, Take 1 tablet (5 mg total) by mouth daily., Disp: 30 tablet, Rfl: 11    gabapentin 300 MG Oral Cap, TAKE 2 CAPSULES EVERY MORNING AND TAKE 3 CAPSULES EVERY EVENING, Disp: 450 capsule, Rfl: 3    NORTRIPTYLINE 10 MG Oral Cap, TAKE 2 CAPSULES EVERY NIGHT, Disp: 180 capsule, Rfl: 3    fluticasone propionate 50 MCG/ACT Nasal Suspension, USE 2 SPRAYS NASALLY EVERY DAY, Disp: 48 g, Rfl: 3    pantoprazole 40 MG Oral Tab EC, Take 1 tablet (40 mg total) by mouth before breakfast., Disp: 90 tablet, Rfl: 2    magnesium oxide 400 MG Oral Tab, Take 1 tablet (400 mg total) by mouth daily., Disp: 90 tablet, Rfl: 3    Hydroxychloroquine Sulfate (PLAQUENIL) 200 MG Oral Tab, Take 1 tablet (200 mg total) by mouth daily., Disp: , Rfl:     CVS VIT D 5000 HIGH-POTENCY 5000 UNIT OR CAPS, 1 CAPSULE DAILY, Disp: , Rfl:     Allergies:  Allergies   Allergen Reactions    Lymphazurin [Isosulfan Blue] ANAPHYLAXIS    Elavil [Amitriptyline Hcl] OTHER (SEE COMMENTS)     Altered mental state    Flexeril [Cyclobenzaprine Hcl] OTHER (SEE COMMENTS)     Altered mental state    Nabumetone      Other reaction(s): Anaphylaxis    Relafen ANAPHYLAXIS    Cephalexin NAUSEA ONLY        Review of Systems:  All other systems reviewed and negative x12    Vital Signs:  /74 (BP Location: Left arm, Patient Position: Sitting, Cuff Size: large)   Pulse 87   Temp 97.4 °F (36.3  °C) (Oral)   Resp 18   Ht 1.549 m (5' 1\")   Wt 79.8 kg (176 lb)   SpO2 97%   BMI 33.25 kg/m²     Physical Examination:  General: Patient is alert and oriented x 3, not in acute distress.  Psych:  Mood and affect appropriate  HEENT: EOMs intact. PERRL. Oropharynx is clear.   Neck: No JVD. No palpable lymphadenopathy. Neck is supple.  Lymphatics: There is no palpable peripheral lymphadenopathy   Chest: Symmetric expansion, nonlabored breathing  Cardiovascular: Regular with palpable distal pulses   Abdomen: Soft, non tender.   Extremities: No edema.  Neurological: 5/5 motor x4.        Laboratory:  No results for input(s): WBC, HGB, PLT, NE, NEUT in the last 504 hours.      Lab Results   Component Value Date    GLU 97 02/08/2024    BUN 11 02/08/2024    BUNCREA SEE NOTE: 02/08/2024    CREATSERUM 0.70 02/08/2024    ANIONGAP 5 12/01/2021    GFRNAA 92 02/21/2022    GFRAA 106 02/21/2022    CA 9.3 02/08/2024    OSMOCALC 289 12/01/2021    ALKPHO 62 11/07/2023    AST 19 11/07/2023    ALT 15 11/07/2023    BILT 0.3 11/07/2023    TP 6.9 11/07/2023    ALB 4.2 11/07/2023    GLOBULIN 2.7 11/07/2023    AGRATIO 1.6 11/07/2023     02/08/2024    K 4.8 02/08/2024     02/08/2024    CO2 31 02/08/2024       Radiology:  none     Cancer Staging   Stage I breast cancer, right (HCC)  Staging form: Breast, AJCC 8th Edition  - Clinical: cT2, cN1, cM0, ER: Positive, MN: Positive, HER2: Negative - Signed by Mehul Casey MD on 11/9/2018  - Pathologic: No stage assigned - Unsigned      Impression and Plan:  68 year old   Post menopausal female status post right mastectomy and axillary lymph node dissection 11/1/2018 for hormone receptor positive HER-2 negative stage IIb (PT2N1) grade 2 infiltrating ductal carcinoma.  Oncotype recurrence score was 11.  One sentinel lymph node was positive.  On axillary lymph node dissection 0 of 16 lymph nodes were involved. She has underlying history of SLE She has history of  osteopenia/osteoporosis.  She is on methotrexate prednisone and Plaquenil chronically  -Based on her Oncotype score do not recommend adjuvant chemotherapy  -She completed adjuvant radiotherapy in February 2019  -Tolerating letrozole well.  Hot flashes have resolved with magnesium  -Left renal lesion being followed by urology  -Osteopenia.  Calcium vitamin D.  Next bone density Oct 2024    RTC 6 months      Fransisco Lazo MD

## 2024-03-05 NOTE — TELEPHONE ENCOUNTER
Patient is calling she is low on the medication  Patient is requesting a temporary supply be sent to [facility] and the regular refill to [facility]

## 2024-03-08 RX ORDER — PANTOPRAZOLE SODIUM 40 MG/1
40 TABLET, DELAYED RELEASE ORAL
Qty: 90 TABLET | Refills: 0 | OUTPATIENT
Start: 2024-03-08

## 2024-03-08 RX ORDER — PANTOPRAZOLE SODIUM 40 MG/1
40 TABLET, DELAYED RELEASE ORAL
Qty: 90 TABLET | Refills: 3 | Status: SHIPPED | OUTPATIENT
Start: 2024-03-08

## 2024-03-08 RX ORDER — PANTOPRAZOLE SODIUM 40 MG/1
40 TABLET, DELAYED RELEASE ORAL
Qty: 30 TABLET | Refills: 0 | Status: SHIPPED | OUTPATIENT
Start: 2024-03-08 | End: 2024-03-08

## 2024-03-08 RX ORDER — PANTOPRAZOLE SODIUM 40 MG/1
40 TABLET, DELAYED RELEASE ORAL
Qty: 90 TABLET | Refills: 3 | OUTPATIENT
Start: 2024-03-08

## 2024-03-08 RX ORDER — PANTOPRAZOLE SODIUM 40 MG/1
40 TABLET, DELAYED RELEASE ORAL
Qty: 30 TABLET | Refills: 0 | Status: SHIPPED | OUTPATIENT
Start: 2024-03-08

## 2024-03-08 NOTE — TELEPHONE ENCOUNTER
Please call pt  Can pt use Miconazole on lips?   Tasked to nursing Render Risk Assessment In Note?: no Detail Level: Simple Additional Notes: Recommended for patient to continue the duct tape to the area until fully healed over

## 2024-03-08 NOTE — TELEPHONE ENCOUNTER
Sent to both  Refill request is for a maintenance medication and has met the criteria specified in the Ambulatory Medication Refill Standing Order for eligibility, visits, laboratory, alerts and was sent to the requested pharmacy.    Requested Prescriptions     Signed Prescriptions Disp Refills    pantoprazole 40 MG Oral Tab EC 90 tablet 3     Sig: Take 1 tablet (40 mg total) by mouth before breakfast.     Authorizing Provider: FANTASMA LUCERO     Ordering User: CLARISA GONZALEZ

## 2024-03-08 NOTE — TELEPHONE ENCOUNTER
Current refill request refused due to refill is either a duplicate request or has active refills at the pharmacy.  Check previous templates.    Requested Prescriptions     Refused Prescriptions Disp Refills    PANTOPRAZOLE 40 MG Oral Tab EC [Pharmacy Med Name: PANTOPRAZOLE 40MG TABLETS] 90 tablet 3     Sig: TAKE 1 TABLET(40 MG) BY MOUTH BEFORE BREAKFAST     Refused By: HOLLY SORENSEN     Reason for Refusal: Refill not appropriate

## 2024-03-08 NOTE — TELEPHONE ENCOUNTER
Current refill request refused due to refill is either a duplicate request or has active refills at the pharmacy.  Check previous templates.    Requested Prescriptions     Refused Prescriptions Disp Refills    PANTOPRAZOLE 40 MG Oral Tab EC [Pharmacy Med Name: PANTOPRAZOLE 40MG TABLETS] 90 tablet 0     Sig: TAKE 1 TABLET(40 MG) BY MOUTH EVERY MORNING BEFORE BREAKFAST     Refused By: HOLLY SORENSEN     Reason for Refusal: Request already responded to by other means (e.g. phone or fax)

## 2024-03-08 NOTE — TELEPHONE ENCOUNTER
Current refill request refused due to refill is either a duplicate request or has active refills at the pharmacy.  Check previous templates.    Requested Prescriptions     Refused Prescriptions Disp Refills    PANTOPRAZOLE 40 MG Oral Tab EC [Pharmacy Med Name: PANTOPRAZOLE SODIUM 40 MG Tablet Delayed Release] 90 tablet 3     Sig: TAKE 1 TABLET (40 MG TOTAL) BY MOUTH BEFORE BREAKFAST.     Refused By: CLARISA GONZALEZ     Reason for Refusal: Request already responded to by other means (e.g. phone or fax)

## 2024-03-08 NOTE — TELEPHONE ENCOUNTER
Patient is calling requesting a revised refill be sent to [facility] for Pantoprazole.  Patient is only asking for a 5 to 7 day hold over until An[facility] mails her script for pantoprazole.      Please advise

## 2024-03-22 ENCOUNTER — OFFICE VISIT (OUTPATIENT)
Dept: INTERNAL MEDICINE CLINIC | Facility: CLINIC | Age: 69
End: 2024-03-22

## 2024-03-22 VITALS
OXYGEN SATURATION: 95 % | BODY MASS INDEX: 33.45 KG/M2 | DIASTOLIC BLOOD PRESSURE: 74 MMHG | TEMPERATURE: 98 F | WEIGHT: 177.19 LBS | HEART RATE: 80 BPM | HEIGHT: 61 IN | SYSTOLIC BLOOD PRESSURE: 122 MMHG

## 2024-03-22 DIAGNOSIS — M32.9 SYSTEMIC LUPUS ERYTHEMATOSUS, UNSPECIFIED SLE TYPE, UNSPECIFIED ORGAN INVOLVEMENT STATUS (HCC): ICD-10-CM

## 2024-03-22 DIAGNOSIS — I10 BENIGN HYPERTENSION: Primary | ICD-10-CM

## 2024-03-22 DIAGNOSIS — N28.1 KIDNEY CYSTS: ICD-10-CM

## 2024-03-22 DIAGNOSIS — I89.0 LYMPHEDEMA OF RIGHT ARM: ICD-10-CM

## 2024-03-22 PROCEDURE — 3074F SYST BP LT 130 MM HG: CPT | Performed by: INTERNAL MEDICINE

## 2024-03-22 PROCEDURE — 1159F MED LIST DOCD IN RCRD: CPT | Performed by: INTERNAL MEDICINE

## 2024-03-22 PROCEDURE — 1160F RVW MEDS BY RX/DR IN RCRD: CPT | Performed by: INTERNAL MEDICINE

## 2024-03-22 PROCEDURE — 3008F BODY MASS INDEX DOCD: CPT | Performed by: INTERNAL MEDICINE

## 2024-03-22 PROCEDURE — 99214 OFFICE O/P EST MOD 30 MIN: CPT | Performed by: INTERNAL MEDICINE

## 2024-03-22 PROCEDURE — 1126F AMNT PAIN NOTED NONE PRSNT: CPT | Performed by: INTERNAL MEDICINE

## 2024-03-22 PROCEDURE — 3078F DIAST BP <80 MM HG: CPT | Performed by: INTERNAL MEDICINE

## 2024-03-22 NOTE — PROGRESS NOTES
Gemini Mendoza is a 68 year old female.    HPI:     Chief Complaint   Patient presents with    Follow - Up     1 month follow-up       67 y/o F with HTN here for F/U; SBP 120s on amlodipine 5 mg po every day;  no CP; no SOB; no headaches; no palpitation; kidney cyst was re-imaged by MRI abdomen in Feb 2024 which showed stable findings; saw Dr Cartwright in March 2024; no add'l imaging is recommended; pt was discharged from urologic care      HISTORY:  Past Medical History:   Diagnosis Date    Asthma (HCC)     Breast cancer (HCC) 11/2018    right breast    Esophageal reflux     Extrinsic asthma, unspecified     Fibromyalgia     Hiatal hernia with GERD     Insomnia     Osteopenia     Peripheral neuropathy     Raynaud's phenomenon     SLE (systemic lupus erythematosus) (HCC)     Dr Valadez    Stress fracture of the metatarsals     4th MT      Past Surgical History:   Procedure Laterality Date    ANKLE FRACTURE SURGERY Left     CHOLECYSTECTOMY  1998    laparoscopic     COLONOSCOPY  12/12/2014    ELECTROCARDIOGRAM, COMPLETE      Scanned to media tab - DOS 04-    HEMORRHOIDECTOMY      HYSTERECTOMY  1992    laparoscopic (endometriosis)    OSCAR BIOPSY STEREO NODULE 1 SITE RIGHT (CPT=19081)  09/26/2018    OSCAR LOCALIZATION WIRE 1 SITE RIGHT (CPT=19281)  1992    MASTECTOMY RIGHT  11/01/2018    OTHER SURGICAL HISTORY  11/01/2018    right breast mastecetomy    RADIATION RIGHT      12/31/18-2/2019      Family History   Problem Relation Age of Onset    Prostate Cancer Father 57        d.62 metastatic    Heart Attack Father         MI    Cancer Mother 27        thyroid; lung @ 63 (smoker)    Heart Disease Mother     Cancer Maternal Grandmother 45        cervical/uterine    Cancer Maternal Aunt 75        pancreatic ca; fmr smoker; d.75    Pancreatic Cancer Maternal Aunt 75    Cancer Sister 61        thyroid ca; sx only    Prostate Cancer Brother 49        surgery    Cancer Maternal Uncle 40        bladder ca, non-smoker     Cancer Maternal Grandfather 60        lymphoma    Cancer Paternal Grandfather         brain ca; dx on autopsy; d.55    Other (drug addict) Son     Cancer Maternal Aunt 62        lung ca; smoker; d.62    Cancer Maternal Aunt 72        lung ca; smoker; d.72    Cancer Maternal Cousin Female 51        thyroid ca    Breast Cancer Maternal Cousin Female 70    Prostate Cancer Paternal Uncle 75        seeds    Breast Cancer Self 61      Social History:   Social History     Socioeconomic History    Marital status:     Number of children: 2   Tobacco Use    Smoking status: Never    Smokeless tobacco: Never   Vaping Use    Vaping Use: Never used   Substance and Sexual Activity    Alcohol use: No    Drug use: No   Other Topics Concern    Caffeine Concern Yes     Comment: Coffee 2 cups daily; Soda    Exercise Yes     Comment: walking   Social History Narrative    The patient does not use an assistive device..      The patient does not live in a home with stairs.        Medications (Active prior to today's visit):  Current Outpatient Medications   Medication Sig Dispense Refill    pantoprazole 40 MG Oral Tab EC Take 1 tablet (40 mg total) by mouth before breakfast. 90 tablet 3    letrozole 2.5 MG Oral Tab Take 1 tablet (2.5 mg total) by mouth daily. 90 tablet 3    amLODIPine 5 MG Oral Tab Take 1 tablet (5 mg total) by mouth daily. 30 tablet 11    gabapentin 300 MG Oral Cap TAKE 2 CAPSULES EVERY MORNING AND TAKE 3 CAPSULES EVERY EVENING 450 capsule 3    NORTRIPTYLINE 10 MG Oral Cap TAKE 2 CAPSULES EVERY NIGHT 180 capsule 3    fluticasone propionate 50 MCG/ACT Nasal Suspension USE 2 SPRAYS NASALLY EVERY DAY 48 g 3    magnesium oxide 400 MG Oral Tab Take 1 tablet (400 mg total) by mouth daily. 90 tablet 3    Hydroxychloroquine Sulfate (PLAQUENIL) 200 MG Oral Tab Take 1 tablet (200 mg total) by mouth daily.      [facility] VIT D 5000 HIGH-POTENCY 5000 UNIT OR CAPS 1 CAPSULE DAILY         Allergies:  Allergies   Allergen Reactions     Lymphazurin [Isosulfan Blue] ANAPHYLAXIS    Elavil [Amitriptyline Hcl] OTHER (SEE COMMENTS)     Altered mental state    Flexeril [Cyclobenzaprine Hcl] OTHER (SEE COMMENTS)     Altered mental state    Nabumetone      Other reaction(s): Anaphylaxis    Relafen ANAPHYLAXIS    Cephalexin NAUSEA ONLY                 ROS:   Constitutional: no weight loss; no fatigue  Cardiovascular:  Negative for chest pain; negative palpitations  Respiratory:  Negative for cough, dyspnea and wheezing  Gastrointestinal:  Negative for abdominal pain, constipation, decreased appetite, diarrhea and vomiting; no melena or hematochezia  All other review of systems are negative.        PHYSICAL EXAM:   Blood pressure 122/74, pulse 80, temperature 97.8 °F (36.6 °C), temperature source Oral, height 5' 1\" (1.549 m), weight 177 lb 3.2 oz (80.4 kg), SpO2 95%.  Constitutional: alert and oriented x3 in no acute distress  HEENT- EOMI, PERRL  Nose/Mouth/Throat: pharynx without erythema; no oral lesions  Neck/Thyroid: neck supple; no thyromegaly  Cardiovascular: RRR, S1, S2, no S3 or murmur  Respiratory: lungs without crackles or wheezes  Abdomen: normoactive bowel sounds, soft, non-tender and non-distended  Extremities: no clubbing, cyanosis or edema           ASSESSMENT/PLAN:   HTN  -SBP 120s on amlodipine 5 mg po every day  -(had no response to losartan)     Kidney cyst  Kidney U/S with indeterminate 1.4 cm nodule to inferior pole of left kidney; MRI abdomen in Feb 2020 showed a 1.1 cm left renal cortical lesion in the inferior pole demonstrates mild enhancement consistent with a small neoplasm; pt seen by urologist Dr Cartwright and advised for surveillance imaging; repeat CT abdomen in Sept 2020 showed stable solid 1.4 cm left lower pole renal mass with MRI characteristics 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 This exhibits persistent mild intralesional enhancement, concerning for neoplasm, which could be benign (series oncocytoma) or malignant. There has been no significant interval  change since 02/11/2020; MRI abdomen in Feb 2022 showed lesion along the inferior pole of the left kidney is decreased in size, measuring 0.7 x 0.7 cm; MRI abdomen in Feb 2023 and Feb 2024 showed stable findings; saw Dr Cartwright in March 2024; no add'l imaging is recommended; pt was discharged from urologic care     History of UTI  Had dysuria on 4/3/21; seen in urgent care; was initially given Macrobid; UCx grew >100K Proteus mirabilis; then was switched to Cipro 500 mg po BID #14; she finished Abx on 4/12; had noticed recurrent +dysuria, +bladder spasms, +hematuria  -s/p amoxicillin 500 mg po TID #30     Breast cancer, right  Stage IIA [id number] IDC RUIQ s/p biopsy, ER 95/AR 90 Her2 neg, low oncotype Favorable Ki-67 8%, s/p right mastectomy and ALND with 1/18 LN positive, s/p adjuvant XRT on 2/11/2019 with Dr Griffin; currently on adjuvant hormonal blockade with letrozole 2.5 mg po qD under care of Dr Lazo; she does not plan to pursue breast reconstruction; left breast mammo in Nov 2023 was BiRads 2; DEXA was ordered by DR Clifton CASTREJON lymphedema  Has been followed by physical therapist, Roxi Torres; uses compression sleeve  Uses Lymphapress every other day to decreased RUE swelling  -pt wishes to use Lymphapress sleeves to treat BLE lymphedema     SLE (systemic lupus erythematosus) (HCC)  on Plaquenil 200 mg po qD as directed by Dr Valadez; Rx per Dr Valadez--> F/U q 6 months; sees chiropractor; also goes to Dr Arteaga Daily at [facility] q 6 mos for Plaquenil eye monintoring     Chronic headache  on nortriptyline 10 mg two tabs (=20mg) po qHS     Peripheral neuropathy  on gabapentin 600 mg po qAM and 900 mg po qHS; Rx per neurologist Dr Parra, though pt no longer  sees  -consider addition of Cymbalta if sxs refractory     GERD  on pantoprazole 40 mg op qD as heartburn has recurred; had EGD in Dec 2014 by Dr Encinas; also seen to have hiatal hernia     History of Depression  off fluoxetine 20 mg since Jan 2017; on no meds     Osteoporosis   DEXA in Sept 2018 shows T-score -1.5; Had DEXA scan in April 2017; was taking OTC calcium supplement; does not take bisphosphonates orally due to dyspepsia and hiatal hernia; undergoes Reclast IV annually in March at Dr Valadez's office; had one dose in Jan 2018, March 2019; did not go in March 2020; planned 5 years' duration; DEXA in Sept 2020 shows T-score -1.4 in left hip; DEXA in Oct 2022 shows T-score -1.8 in left femoral neck; CT abdomen in Sept 2020 shows mild chronic L3 superior endplate compression fracture.  -Reclast currently on hold; pt will address with Dr Hopper; DEXA was ordered by Dr Lazo     Internal hemorrhoids  S/p banding by Dr Hatch in 2010-11; has occasional stool incontinence; s/p banding with Dr Casey in 2018     Obesity, BMI 30-35 with comorbidity  Body mass index is 35.35 kg/m².' advise exercise and weight loss     Lumbar spondylosis  MRI lumbar spine in Jan 2020 shows  L4-L5:  Mild left greater than lateral recess and mild left greater than right neural foraminal stenosis.  Mild progression since July, 2015.    L5-S1:  Moderate spinal canal stenosis, predominantly related to dorsal and ventral epidural lipomatosis.  No significant interval change since July, 2015.    L1-L2:  Degenerative disc disease, which is new or progressive since July, 2015.  No significant neural compromise.  -s/p LESI in Feb 2020; pt followed by Dr Ambrosio  -MRI L-spine n Nov 2023 showed Degenerative disc and facet disease throughout the lumbar spine, most prominent at L4-L5, where there is moderate narrowing of the canal and bilateral neural foramen.  This has minimally progressed since 1/11/2020.   -F/U Dr Ambrosio     Trigger finger    Of right 3rd finger; s/p injection in Sept 2020 by Dr Ambrosio     hypercholesterolemia   in Nov 2023     Vitamin D deficiency  VItamin D level 58 in Nov 2023; on Vitamin D 5000 units daily     Atypical nevus  To left trunk; pt referred to dermatologist Dr Bowens     [facility]  S/p unilateral oophorectomy/ hysterectomy in 1992, so PAP not indicated; Had colonoscopy in Dec 2014 with GI Dr Encinas (next colonoscopy due in 10 years, or Dec 2024); Prevnar 20 given Dec 2022     RTC 1 month  MWE 11/9/23     Spent 30 minutes obtaining history, evaluating patient, discussing treatment options, diet, exercise, review of available labs and radiology reports, and completing documentation.                Orders This Visit:  No orders of the defined types were placed in this encounter.      Meds This Visit:  Requested Prescriptions      No prescriptions requested or ordered in this encounter       Imaging & Referrals:  None     3/22/2024  Garrick Croft MD

## 2024-04-11 NOTE — PROGRESS NOTES
Referring Physician: Dr. Araceli Harrell and Dr. Naveen Felipe  Diagnosis: Lymphedema (I89.0)  TOS (thoracic outlet syndrome) (G54.0)  Ulnar neuropathy at elbow of right upper extremity (G56.21)  Malignant neoplasm of right breast in female, estrogen receptor positive, palpable cording right axilla/medial arm but an area of tissue restriction (mid medial upper arm) which correlates w/ area of increased pain    Decreased tissue mobility superior axilla and lateral chest    5/16/19:  AROM: R shoulder in sitting: flex: 150, session. Will proceed accordingly.     Goals:   Goals (discussed and planned with patient involvement):    Revised 4/30/19  To be met in 10 visits:  1) Decrease swelling to be less than 10 % greater than unaffected arm to allow patient to be fitted for com Alert and oriented to person, place and time/Patient baseline mental status

## 2024-04-15 RX ORDER — AMLODIPINE BESYLATE 5 MG/1
5 TABLET ORAL DAILY
Qty: 30 TABLET | Refills: 11 | Status: CANCELLED | OUTPATIENT
Start: 2024-04-15

## 2024-04-16 NOTE — TELEPHONE ENCOUNTER
Pt has 1 year at local. Wait to see if pt requested this    Current refill request refused due to refill is either a duplicate request or has active refills at the pharmacy.  Check previous templates.    Requested Prescriptions     Pending Prescriptions Disp Refills    amLODIPine 5 MG Oral Tab 30 tablet 11     Sig: Take 1 tablet (5 mg total) by mouth daily.

## 2024-06-17 ENCOUNTER — TELEPHONE (OUTPATIENT)
Dept: INTERNAL MEDICINE CLINIC | Facility: CLINIC | Age: 69
End: 2024-06-17

## 2024-06-17 DIAGNOSIS — R63.5 WEIGHT GAIN: Primary | ICD-10-CM

## 2024-06-17 NOTE — TELEPHONE ENCOUNTER
Advise referral to Endocrinologist Dr Ben[address]  [address]  [place] [address] [phone]    Referral generated    Please call pt

## 2024-06-17 NOTE — TELEPHONE ENCOUNTER
Patient is calling for name of endocrinologist for   weight gain, swelling and body aches.    Best call back number [phone]

## 2024-07-08 ENCOUNTER — OFFICE VISIT (OUTPATIENT)
Dept: PHYSICAL MEDICINE AND REHAB | Facility: CLINIC | Age: 69
End: 2024-07-08
Payer: MEDICARE

## 2024-07-08 VITALS — OXYGEN SATURATION: 94 % | WEIGHT: 180 LBS | BODY MASS INDEX: 34 KG/M2 | HEART RATE: 104 BPM

## 2024-07-08 DIAGNOSIS — M25.552 LEFT HIP PAIN: ICD-10-CM

## 2024-07-08 DIAGNOSIS — M51.36 ANNULAR TEAR OF LUMBAR DISC: ICD-10-CM

## 2024-07-08 DIAGNOSIS — M51.9 LUMBAR DISC DISEASE: ICD-10-CM

## 2024-07-08 DIAGNOSIS — C77.9 PRIMARY CANCER OF RIGHT BREAST WITH STAGE 2 NODAL METASTASIS PER AMERICAN JOINT COMMITTEE ON CANCER 7TH EDITION (N2) (HCC): ICD-10-CM

## 2024-07-08 DIAGNOSIS — M48.02 CERVICAL STENOSIS OF SPINE: ICD-10-CM

## 2024-07-08 DIAGNOSIS — M54.16 LUMBAR RADICULOPATHY: ICD-10-CM

## 2024-07-08 DIAGNOSIS — M48.061 LUMBAR FORAMINAL STENOSIS: ICD-10-CM

## 2024-07-08 DIAGNOSIS — I89.0 LYMPHEDEMA OF RIGHT ARM: ICD-10-CM

## 2024-07-08 DIAGNOSIS — C50.911 PRIMARY CANCER OF RIGHT BREAST WITH STAGE 2 NODAL METASTASIS PER AMERICAN JOINT COMMITTEE ON CANCER 7TH EDITION (N2) (HCC): ICD-10-CM

## 2024-07-08 DIAGNOSIS — M43.16 SPONDYLOLISTHESIS OF LUMBAR REGION: ICD-10-CM

## 2024-07-08 DIAGNOSIS — M65.332 ACQUIRED TRIGGER FINGER OF LEFT MIDDLE FINGER: Primary | ICD-10-CM

## 2024-07-08 DIAGNOSIS — M50.90 CERVICAL DISC DISEASE: ICD-10-CM

## 2024-07-08 DIAGNOSIS — M65.331 TRIGGER MIDDLE FINGER OF RIGHT HAND: ICD-10-CM

## 2024-07-08 RX ORDER — LIDOCAINE HYDROCHLORIDE 10 MG/ML
1 INJECTION, SOLUTION INFILTRATION; PERINEURAL ONCE
Status: COMPLETED | OUTPATIENT
Start: 2024-07-08 | End: 2024-07-08

## 2024-07-08 RX ORDER — TRIAMCINOLONE ACETONIDE 40 MG/ML
40 INJECTION, SUSPENSION INTRA-ARTICULAR; INTRAMUSCULAR ONCE
Status: COMPLETED | OUTPATIENT
Start: 2024-07-08 | End: 2024-07-08

## 2024-07-08 NOTE — PATIENT INSTRUCTIONS
Plan  I did bilateral 3rd finger trigger finger injections in the office today under ultrasound guidance. (See procedure note)    She will get a left hip x-ray.    If the hip x-ray is normal, then she might need to have a left L5 TFESI once the edema is better.    She will speak with her PP about the edema that she is having since being on a new blood pressure medication.    The patient will follow up in 2-3 months, but the patient will call me in 2 weeks to let me know how the injections worked.

## 2024-07-08 NOTE — PROCEDURES
I did a bilateral 3rd finger trigger finger/flexor digitorum superficialis tendon sheath injections in the office under ultrasound guidance at the level of the A1 pulleys.  Under ultrasound guidance the bilateral 3rd flexor digitorum superficialis tendons and A1 pulleys were identified.  Marks were placed on the patient's skin and it was cleaned with betadyne swabs x 3 and anesthetized with cold spray bilaterally.  Then 27 gauge needles were inserted and directed to the A1 pulleys just superior to the flexor digitorum superficialis tendons.  Aspiration was performed and no blood, fluid, or air was aspirated and the tendon sheaths were injected with 0.5 ml of 40 mg of Kenalog/ml and 0.5 ml of 1% lidocaine without epinephrine. The patient tolerated the procedure well.  A band aid and triple antibiotic ointment were applied.     Images were saved to the unit and will transferred to the PACS system.

## 2024-07-08 NOTE — PROGRESS NOTES
Low Back Pain H & P    Chief Complaint:   Chief Complaint   Patient presents with    Follow - Up     09/18/23 LOV. Patient Is here because she would like b/l 3rd trigger finger injections. Pain 4/10. Denies t/n. Tylenol ES daily.      Nursing note reviewed and verified.    Patient was last seen on 9/18/2023.  She states that her left low back pain is still present and she is now having left groin and lateral hip pain.  She has been retaining fluid which she feels might be contributing to her low back pain and left leg pain.    She has developed the pain of the bilateral 3rd fingers and catching in the morning.    Past Medical History   Past Medical History:    Asthma (HCC)    Breast cancer (HCC)    right breast    Esophageal reflux    Extrinsic asthma, unspecified    Fibromyalgia    Hiatal hernia with GERD    Insomnia    Osteopenia    Peripheral neuropathy    Raynaud's phenomenon    SLE (systemic lupus erythematosus) ([facility])    Dr Valadez    Stress fracture of the metatarsals    4th MT       Past Surgical History   Past Surgical History:   Procedure Laterality Date    Ankle fracture surgery Left     Cholecystectomy  1998    laparoscopic     Colonoscopy  12/12/2014    Electrocardiogram, complete      Scanned to media tab - DOS 04-    Hemorrhoidectomy      Hysterectomy  1992    laparoscopic (endometriosis)    Kaveh biopsy stereo nodule 1 site right (cpt=19081)  09/26/2018    Kaveh localization wire 1 site right (cpt=19281)  1992    Mastectomy right  11/01/2018    Other surgical history  11/01/2018    right breast mastecetomy    Radiation right      12/31/18-2/2019       Family History   Family History   Problem Relation Age of Onset    Prostate Cancer Father 57        d.62 metastatic    Heart Attack Father         MI    Cancer Mother 27        thyroid; lung @ 63 (smoker)    Heart Disease Mother     Cancer Maternal Grandmother 45        cervical/uterine    Cancer Maternal Aunt 75        pancreatic ca; fmr smoker;  d.75    Pancreatic Cancer Maternal Aunt 75    Cancer Sister 61        thyroid ca; sx only    Prostate Cancer Brother 49        surgery    Cancer Maternal Uncle 40        bladder ca, non-smoker    Cancer Maternal Grandfather 60        lymphoma    Cancer Paternal Grandfather         brain ca; dx on autopsy; d.55    Other (drug addict) Son     Cancer Maternal Aunt 62        lung ca; smoker; d.62    Cancer Maternal Aunt 72        lung ca; smoker; d.72    Cancer Maternal Cousin Female 51        thyroid ca    Breast Cancer Maternal Cousin Female 70    Prostate Cancer Paternal Uncle 75        seeds    Breast Cancer Self 63       Social History   Social History     Socioeconomic History    Marital status:      Spouse name: Not on file    Number of children: 2    Years of education: Not on file    Highest education level: Not on file   Occupational History    Not on file   Tobacco Use    Smoking status: Never    Smokeless tobacco: Never   Vaping Use    Vaping status: Never Used   Substance and Sexual Activity    Alcohol use: No    Drug use: No    Sexual activity: Not on file   Other Topics Concern     Service Not Asked    Blood Transfusions Not Asked    Caffeine Concern Yes     Comment: Coffee 2 cups daily; Soda    Occupational Exposure Not Asked    Hobby Hazards Not Asked    Sleep Concern Not Asked    Stress Concern Not Asked    Weight Concern Not Asked    Special Diet Not Asked    Back Care Not Asked    Exercise Yes     Comment: walking    Bike Helmet Not Asked    Seat Belt Not Asked    Self-Exams Not Asked   Social History Narrative    The patient does not use an assistive device..      The patient does not live in a home with stairs.     Social Determinants of Health     Financial Resource Strain: Not on file   Food Insecurity: Not on file   Transportation Needs: Not on file   Physical Activity: Not on file   Stress: Not on file   Social Connections: Not on file   Housing Stability: Not on file       PE:   The patient does appear in her stated age in no distress.  The patient is well groomed.    Psychiatric:  The patient is alert and oriented x 3.  The patient has a normal affect and mood.      Respiratory:  No acute respiratory distress. Patient does not have a cough.    HEENT:  Extraocular muscles are intact. There is no kern icterus. Pupils are equal, round, and reactive to light. No redness or discharge bilaterally.    Skin:  There are no rashes or lesions.    Vitals:  Vitals:    07/08/24 1540   Pulse: 104       Gait:    Gait: Normal gait   Sit to Stand: no difficulty      Vascular lower extremity:   Dorsalis pedis pulse-RIGHT 2+   Dorsalis pedis pulse-LEFT 2+   Tibialis posterior pulse-RIGHT 2+   Tibialis posterior pulse-LEFT 2+     Neurological Lower Extremity:    Light Touch Sensation: Intact in bilateral LE except:  Decreased in the  lateral Left thigh   LE Muscle Strength: All LE strength measurements 5/5 except:  Hamstring LEFT:   4-/5   RIGHT plantar reflexes: downward response   LEFT plantar reflexes: downward response   Reflexes: 2+ in bilateral lower extremities except:  Left Achilles tendon which are 1+     Hip: Hips are stable.    RIGHT hip ROM normal   LEFT hip ROM normal   Right hip flexion Negative pain   LEFT hip flexion Negative pain   RIGHT hip DARIO test Negative for pain   LEFT hip DARIO test Negative for pain   RIGHT hip internal rotation Negative for pain   LEFT hip internal rotation Positive for left lateral hip pain and left groin pain     Neural Tension Tests Lumbar Spine:  Supine straight leg raise-RIGHT Negative for pain   Supine straight leg raise-LEFT Negative for pain     bilateral Hand:  There is palpable nodule over the bilateral 3rd MCP joints in the area of the A1 pulleys.  These nodules are painful on palpation.  With finger flexion there is no catching of the 3rd digits in flexion at the PIP and DIP joints.  These issues are not present at any of the other fingers on the  bilateral hands.      Assessment  1. Acquired trigger finger of left middle finger    2. Trigger middle finger of right hand    3. L1-2 right mild, L3-4 mild diffuse & left mild-mod far lateral, L4-5 mild-mod central & bilateral mod far lateral, L5-S1 left mil-mod foraminal & mild central bulging discs    4. C2-3 left mild paracentral, C3-4 right mild paracentral, C5-6 mod diffuse, C7-T1 small central bulging discs    5. Annular tear of lumbar disc: L4-5 left far lateral    6. C5-6 mild-mod central & bilateral mild foraminal stenosis    7. Left hip pain    8. L3-4 grade 1, L4-5 grade 1 spondylolisthesis    9. Primary cancer of right breast with stage 2 sheila metastasis per American Joint Committee on Cancer 7th edition (N2) (HCC)    10. Lymphedema of right arm    11. left L5-S1 radiculopathy    12. L4-5 right mild/left mild-moderate, L3-4 left mild foraminal stenosis         Plan  I did bilateral 3rd finger trigger finger injections in the office today under ultrasound guidance. (See procedure note)    She will get a left hip x-ray.    If the hip x-ray is normal, then she might need to have a left L5 TFESI once the edema is better.    She will speak with her PP about the edema that she is having since being on a new blood pressure medication.    The patient will follow up in 2-3 months, but the patient will call me in 2 weeks to let me know how the injections worked.    The patient understands and agrees with the stated plan.  Fransisco Ambrosio MD  7/8/2024

## 2024-07-09 ENCOUNTER — TELEPHONE (OUTPATIENT)
Dept: PHYSICAL MEDICINE AND REHAB | Facility: CLINIC | Age: 69
End: 2024-07-09

## 2024-07-09 ENCOUNTER — HOSPITAL ENCOUNTER (OUTPATIENT)
Dept: GENERAL RADIOLOGY | Age: 69
Discharge: HOME | End: 2024-07-09
Attending: PHYSICAL MEDICINE & REHABILITATION
Payer: MEDICARE

## 2024-07-09 DIAGNOSIS — M25.552 LEFT HIP PAIN: ICD-10-CM

## 2024-07-09 DIAGNOSIS — M48.061 LUMBAR FORAMINAL STENOSIS: ICD-10-CM

## 2024-07-09 DIAGNOSIS — M54.16 LUMBAR RADICULOPATHY: Primary | ICD-10-CM

## 2024-07-09 PROCEDURE — 73502 X-RAY EXAM HIP UNI 2-3 VIEWS: CPT | Performed by: PHYSICAL MEDICINE & REHABILITATION

## 2024-07-10 ENCOUNTER — OFFICE VISIT (OUTPATIENT)
Dept: INTERNAL MEDICINE CLINIC | Facility: CLINIC | Age: 69
End: 2024-07-10

## 2024-07-10 VITALS
BODY MASS INDEX: 34.48 KG/M2 | DIASTOLIC BLOOD PRESSURE: 96 MMHG | HEART RATE: 76 BPM | HEIGHT: 61 IN | TEMPERATURE: 97 F | OXYGEN SATURATION: 99 % | SYSTOLIC BLOOD PRESSURE: 120 MMHG | WEIGHT: 182.63 LBS

## 2024-07-10 DIAGNOSIS — M81.8 OTHER OSTEOPOROSIS, UNSPECIFIED PATHOLOGICAL FRACTURE PRESENCE: ICD-10-CM

## 2024-07-10 DIAGNOSIS — M47.816 LUMBAR SPONDYLOSIS: ICD-10-CM

## 2024-07-10 DIAGNOSIS — E55.9 VITAMIN D DEFICIENCY: ICD-10-CM

## 2024-07-10 DIAGNOSIS — Z17.0 MALIGNANT NEOPLASM OF RIGHT BREAST IN FEMALE, ESTROGEN RECEPTOR POSITIVE, UNSPECIFIED SITE OF BREAST (HCC): ICD-10-CM

## 2024-07-10 DIAGNOSIS — I10 BENIGN HYPERTENSION: Primary | ICD-10-CM

## 2024-07-10 DIAGNOSIS — M32.9 SYSTEMIC LUPUS ERYTHEMATOSUS, UNSPECIFIED SLE TYPE, UNSPECIFIED ORGAN INVOLVEMENT STATUS (HCC): ICD-10-CM

## 2024-07-10 DIAGNOSIS — G62.9 PERIPHERAL POLYNEUROPATHY: ICD-10-CM

## 2024-07-10 DIAGNOSIS — R60.9 EDEMA, UNSPECIFIED TYPE: ICD-10-CM

## 2024-07-10 DIAGNOSIS — I89.0 LYMPHEDEMA OF RIGHT ARM: ICD-10-CM

## 2024-07-10 DIAGNOSIS — G89.29 CHRONIC NONINTRACTABLE HEADACHE, UNSPECIFIED HEADACHE TYPE: ICD-10-CM

## 2024-07-10 DIAGNOSIS — K21.9 GASTROESOPHAGEAL REFLUX DISEASE, UNSPECIFIED WHETHER ESOPHAGITIS PRESENT: ICD-10-CM

## 2024-07-10 DIAGNOSIS — C50.911 MALIGNANT NEOPLASM OF RIGHT BREAST IN FEMALE, ESTROGEN RECEPTOR POSITIVE, UNSPECIFIED SITE OF BREAST (HCC): ICD-10-CM

## 2024-07-10 DIAGNOSIS — Z86.59 HISTORY OF DEPRESSION: ICD-10-CM

## 2024-07-10 DIAGNOSIS — R51.9 CHRONIC NONINTRACTABLE HEADACHE, UNSPECIFIED HEADACHE TYPE: ICD-10-CM

## 2024-07-10 DIAGNOSIS — K64.8 INTERNAL HEMORRHOID: ICD-10-CM

## 2024-07-10 DIAGNOSIS — N28.1 KIDNEY CYSTS: ICD-10-CM

## 2024-07-10 DIAGNOSIS — E66.01 SEVERE OBESITY (BMI 35.0-39.9) WITH COMORBIDITY (HCC): ICD-10-CM

## 2024-07-10 PROCEDURE — 3080F DIAST BP >= 90 MM HG: CPT | Performed by: INTERNAL MEDICINE

## 2024-07-10 PROCEDURE — G2211 COMPLEX E/M VISIT ADD ON: HCPCS | Performed by: INTERNAL MEDICINE

## 2024-07-10 PROCEDURE — 99214 OFFICE O/P EST MOD 30 MIN: CPT | Performed by: INTERNAL MEDICINE

## 2024-07-10 PROCEDURE — 3074F SYST BP LT 130 MM HG: CPT | Performed by: INTERNAL MEDICINE

## 2024-07-10 PROCEDURE — 1160F RVW MEDS BY RX/DR IN RCRD: CPT | Performed by: INTERNAL MEDICINE

## 2024-07-10 PROCEDURE — 3008F BODY MASS INDEX DOCD: CPT | Performed by: INTERNAL MEDICINE

## 2024-07-10 PROCEDURE — 1126F AMNT PAIN NOTED NONE PRSNT: CPT | Performed by: INTERNAL MEDICINE

## 2024-07-10 PROCEDURE — 1159F MED LIST DOCD IN RCRD: CPT | Performed by: INTERNAL MEDICINE

## 2024-07-10 RX ORDER — FUROSEMIDE 20 MG/1
20 TABLET ORAL DAILY PRN
Qty: 30 TABLET | Refills: 5 | Status: SHIPPED | OUTPATIENT
Start: 2024-07-10

## 2024-07-10 RX ORDER — METOPROLOL SUCCINATE 50 MG/1
50 TABLET, EXTENDED RELEASE ORAL DAILY
Qty: 30 TABLET | Refills: 11 | Status: SHIPPED | OUTPATIENT
Start: 2024-07-10

## 2024-07-10 NOTE — PROGRESS NOTES
Gemini Mendoza is a 69 year old female.    HPI:     Chief Complaint   Patient presents with    Swelling       68 y/o F with HTN, lymphedema of arm here with c/o edema to arms and legs; is taking amlodipine 5 mg po every day; edema worsens as day progresses;  no CP; no SOB; no headaches; no palpitation        HISTORY:  Past Medical History:    Asthma (HCC)    Breast cancer (HCC)    right breast    Esophageal reflux    Extrinsic asthma, unspecified    Fibromyalgia    Hiatal hernia with GERD    Insomnia    Osteopenia    Peripheral neuropathy    Raynaud's phenomenon    SLE (systemic lupus erythematosus) (HCC)    Dr Valadez    Stress fracture of the metatarsals    4th MT      Past Surgical History:   Procedure Laterality Date    Ankle fracture surgery Left     Cholecystectomy  1998    laparoscopic     Colonoscopy  12/12/2014    Electrocardiogram, complete      Scanned to media tab - DOS 04-    Hemorrhoidectomy      Hysterectomy  1992    laparoscopic (endometriosis)    Kaveh biopsy stereo nodule 1 site right (cpt=19081)  09/26/2018    Kaveh localization wire 1 site right (cpt=19281)  1992    Mastectomy right  11/01/2018    Other surgical history  11/01/2018    right breast mastecetomy    Radiation right      12/31/18-2/2019      Family History   Problem Relation Age of Onset    Prostate Cancer Father 57        d.62 metastatic    Heart Attack Father         MI    Cancer Mother 27        thyroid; lung @ 63 (smoker)    Heart Disease Mother     Cancer Maternal Grandmother 45        cervical/uterine    Cancer Maternal Aunt 75        pancreatic ca; fmr smoker; d.75    Pancreatic Cancer Maternal Aunt 75    Cancer Sister 61        thyroid ca; sx only    Prostate Cancer Brother 49        surgery    Cancer Maternal Uncle 40        bladder ca, non-smoker    Cancer Maternal Grandfather 60        lymphoma    Cancer Paternal Grandfather         brain ca; dx on autopsy; d.55    Other (drug addict) Son     Cancer Maternal Aunt 62         lung ca; smoker; d.62    Cancer Maternal Aunt 72        lung ca; smoker; d.72    Cancer Maternal Cousin Female 51        thyroid ca    Breast Cancer Maternal Cousin Female 70    Prostate Cancer Paternal Uncle 75        seeds    Breast Cancer Self 61      Social History:   Social History     Socioeconomic History    Marital status:     Number of children: 2   Tobacco Use    Smoking status: Never    Smokeless tobacco: Never   Vaping Use    Vaping status: Never Used   Substance and Sexual Activity    Alcohol use: No    Drug use: No   Other Topics Concern    Caffeine Concern Yes     Comment: Coffee 2 cups daily; Soda    Exercise Yes     Comment: walking   Social History Narrative    The patient does not use an assistive device..      The patient does not live in a home with stairs.        Medications (Active prior to today's visit):  Current Outpatient Medications   Medication Sig Dispense Refill    Acetaminophen (TYLENOL EXTRA STRENGTH OR) Take by mouth.      metoprolol succinate ER 50 MG Oral Tablet 24 Hr Take 1 tablet (50 mg total) by mouth daily. 30 tablet 11    furosemide 20 MG Oral Tab Take 1 tablet (20 mg total) by mouth daily as needed (leg edema). 30 tablet 5    pantoprazole 40 MG Oral Tab EC Take 1 tablet (40 mg total) by mouth before breakfast. 90 tablet 3    letrozole 2.5 MG Oral Tab Take 1 tablet (2.5 mg total) by mouth daily. 90 tablet 3    gabapentin 300 MG Oral Cap TAKE 2 CAPSULES EVERY MORNING AND TAKE 3 CAPSULES EVERY EVENING 450 capsule 3    NORTRIPTYLINE 10 MG Oral Cap TAKE 2 CAPSULES EVERY NIGHT 180 capsule 3    fluticasone propionate 50 MCG/ACT Nasal Suspension USE 2 SPRAYS NASALLY EVERY DAY 48 g 3    magnesium oxide 400 MG Oral Tab Take 1 tablet (400 mg total) by mouth daily. 90 tablet 3    Hydroxychloroquine Sulfate (PLAQUENIL) 200 MG Oral Tab Take 1 tablet (200 mg total) by mouth daily.      [employer] VIT D 5000 HIGH-POTENCY 5000 UNIT OR CAPS 1 CAPSULE DAILY         Allergies:  Allergies    Allergen Reactions    Lymphazurin [Isosulfan Blue] ANAPHYLAXIS    Elavil [Amitriptyline Hcl] OTHER (SEE COMMENTS)     Altered mental state    Flexeril [Cyclobenzaprine Hcl] OTHER (SEE COMMENTS)     Altered mental state    Nabumetone      Other reaction(s): Anaphylaxis    Relafen ANAPHYLAXIS    Cephalexin NAUSEA ONLY                 ROS:   Constitutional: no weight loss; no fatigue  Cardiovascular:  Negative for chest pain; negative palpitations  Respiratory:  Negative for cough, dyspnea and wheezing  Gastrointestinal:  Negative for abdominal pain, constipation, decreased appetite, diarrhea and vomiting; no melena or hematochezia  All other review of systems are negative.        PHYSICAL EXAM:   Blood pressure (!) 120/96, pulse 76, temperature 97 °F (36.1 °C), height 5' 1\" (1.549 m), weight 182 lb 9.6 oz (82.8 kg), SpO2 99%.  Constitutional: alert and oriented x3 in no acute distress  HEENT- EOMI, PERRL  Nose/Mouth/Throat: pharynx without erythema; no oral lesions  Neck/Thyroid: neck supple; no thyromegaly  Cardiovascular: RRR, S1, S2, no S3 or murmur  Respiratory: lungs without crackles or wheezes  Abdomen: normoactive bowel sounds, soft, non-tender and non-distended  Extremities: no clubbing, cyanosis; non-pitting edema to arms and legs           ASSESSMENT/PLAN:   HTN  -SBP 120s on amlodipine 5 mg po every day  -(had no response to losartan)  -due to leg edema, will stop amlodipine  -start metoprolol ER 50 mg po qD     Edema  To both legs and both arms; may be side effect of amlodipine, venous insufficiency (edema worsens as day progresses), or lymphedema;  -start Lasix 20 mg po every day prn; advise take daily x 7d, then periodically thereafter    Kidney cyst  Kidney U/S with indeterminate 1.4 cm nodule to inferior pole of left kidney; MRI abdomen in Feb 2020 showed a 1.1 cm left renal cortical lesion in the inferior pole demonstrates mild enhancement consistent with a small neoplasm; pt seen by urologist   Gabbie and advised for surveillance imaging; repeat CT abdomen in Sept 2020 showed stable solid 1.4 cm left lower pole renal mass with 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 This exhibits persistent mild intralesional enhancement, concerning for neoplasm, which could be benign (series oncocytoma) or malignant. There has been no significant interval  change since 02/11/2020; MRI abdomen in Feb 2022 showed lesion along the inferior pole of the left kidney is decreased in size, measuring 0.7 x 0.7 cm; MRI abdomen in Feb 2023 and Feb 2024 showed stable findings; saw Dr Cartwright in March 2024; no add'l imaging is recommended; pt was discharged from urologic care     History of UTI  Had dysuria on 4/3/21; seen in urgent care; was initially given Macrobid; UCx grew >100K Proteus mirabilis; then was switched to Cipro 500 mg po BID #14; she finished Abx on 4/12; had noticed recurrent +dysuria, +bladder spasms, +hematuria  -s/p amoxicillin 500 mg po TID #30     Breast cancer, right  Stage IIA [id number] IDC RUIQ s/p biopsy, ER 95/NE 90 Her2 neg, low oncotype Favorable Ki-67 8%, s/p right mastectomy and ALND with 1/18 LN positive, s/p adjuvant XRT on 2/11/2019 with Dr Griffin; currently on adjuvant hormonal blockade with letrozole 2.5 mg po qD under care of Dr Lazo; she does not plan to pursue breast reconstruction; left breast mammo in Nov 2023 was BiRads 2; DEXA was ordered by DR Clifton CASTREJON lymphedema  Has been followed by physical therapist, Roxi Torres; uses compression sleeve  Uses Lymphapress every other day to decreased RUE swelling  -pt uses Lymphapress sleeves to treat RUE lymphedema     SLE (systemic lupus erythematosus) (HCC)  on Plaquenil 200 mg po qD as directed by Dr Valadez; Rx per Dr Valadez-->  F/U q 6 months; sees chiropractor; also goes to Dr Arteaga Daily at [facility] q 6 mos for Plaquenil eye monintoring     Chronic headache  on nortriptyline 10 mg two tabs (=20mg) po qHS     Peripheral neuropathy  on gabapentin 600 mg po qAM and 900 mg po qHS; Rx per neurologist Dr Parra, though pt no longer sees  -consider addition of Cymbalta if sxs refractory     GERD  on pantoprazole 40 mg op qD as heartburn has recurred; had EGD in Dec 2014 by Dr Encinas; also seen to have hiatal hernia     History of Depression  off fluoxetine 20 mg since Jan 2017; on no meds     Osteoporosis   DEXA in Sept 2018 shows T-score -1.5; Had DEXA scan in April 2017; was taking OTC calcium supplement; does not take bisphosphonates orally due to dyspepsia and hiatal hernia; undergoes Reclast IV annually in March at Dr Valadez's office; had one dose in Jan 2018, March 2019; did not go in March 2020; planned 5 years' duration; DEXA in Sept 2020 shows T-score -1.4 in left hip; DEXA in Oct 2022 shows T-score -1.8 in left femoral neck; CT abdomen in Sept 2020 shows mild chronic L3 superior endplate compression fracture.  -Reclast currently on hold; pt will address with Dr Hopper; DEXA was ordered by Dr Lazo     Internal hemorrhoids  S/p banding by Dr Hatch in 2010-11; has occasional stool incontinence; s/p banding with Dr Casey in 2018     Obesity, BMI 30-35 with comorbidity  Body mass index is 34.5 kg/m².   advise exercise and weight loss     Lumbar spondylosis  MRI lumbar spine in Jan 2020 shows  L4-L5:  Mild left greater than lateral recess and mild left greater than right neural foraminal stenosis.  Mild progression since July, 2015.    L5-S1:  Moderate spinal canal stenosis, predominantly related to dorsal and ventral epidural lipomatosis.  No significant interval change since July, 2015.    L1-L2:  Degenerative disc disease, which is new or progressive since July, 2015.  No significant neural compromise.  -s/p LESI in  Feb 2020; pt followed by Dr mAbrosio  -MRI L-spine n Nov 2023 showed Degenerative disc and facet disease throughout the lumbar spine, most prominent at L4-L5, where there is moderate narrowing of the canal and bilateral neural foramen.  This has minimally progressed since 1/11/2020.   -F/U Dr Ambrosio     Trigger finger   Of right 3rd finger; s/p injection in Sept 2020 by Dr Ambrosio     hypercholesterolemia   in Nov 2023     Vitamin D deficiency  VItamin D level 58 in Nov 2023; on Vitamin D 5000 units daily     Atypical nevus  To left trunk; pt referred to dermatologist Dr Bowens     [facility]  S/p unilateral oophorectomy/ hysterectomy in 1992, so PAP not indicated; Had colonoscopy in Dec 2014 with GI Dr Encinas (next colonoscopy due in 10 years, or Dec 2024); Prevnar 20 given Dec 2022       MWE 11/9/23     Spent 30 minutes obtaining history, evaluating patient, discussing treatment options, diet, exercise, review of available labs and radiology reports, and completing documentation.                Orders This Visit:  Orders Placed This Encounter   Procedures    CBC [6399] [Q]    COMP METABOLIC PANEL [21184] [Q]    TSH W REFLEX TO FREE T4 [27810][Q]    LIPID PANEL [7600] [Q]    VITAMIN D, SCREEN [38171][Q]       Meds This Visit:  Requested Prescriptions     Signed Prescriptions Disp Refills    metoprolol succinate ER 50 MG Oral Tablet 24 Hr 30 tablet 11     Sig: Take 1 tablet (50 mg total) by mouth daily.    furosemide 20 MG Oral Tab 30 tablet 5     Sig: Take 1 tablet (20 mg total) by mouth daily as needed (leg edema).       Imaging & Referrals:  None     7/10/2024  Garrick Croft MD

## 2024-07-10 NOTE — TELEPHONE ENCOUNTER
Patient completed left hip xray on 7/9/24.     Per  at LOV 7/8/24: \"Plan  I did bilateral 3rd finger trigger finger injections in the office today under ultrasound guidance. (See procedure note)  She will get a left hip x-ray.  If the hip x-ray is normal, then she might need to have a left L5 TFESI once the edema is better.  She will speak with her PP about the edema that she is having since being on a new blood pressure medication.  The patient will follow up in 2-3 months, but the patient will call me in 2 weeks to let me know how the injections worked.\"    Patient has appointment with PCP-  today 7/10/24.    Injection order pended for 's review/approval, if appropriate

## 2024-07-15 ENCOUNTER — TELEPHONE (OUTPATIENT)
Dept: INTERNAL MEDICINE CLINIC | Facility: CLINIC | Age: 69
End: 2024-07-15

## 2024-07-15 ENCOUNTER — TELEPHONE (OUTPATIENT)
Dept: PHYSICAL MEDICINE AND REHAB | Facility: CLINIC | Age: 69
End: 2024-07-15

## 2024-07-15 DIAGNOSIS — M54.16 LUMBAR RADICULOPATHY: Primary | ICD-10-CM

## 2024-07-15 NOTE — TELEPHONE ENCOUNTER
[facility] faxed over a request for     Metoprolol and  Furosemide     Possible dup      Scripts already sent to [facility] in [place] on 7/10/24

## 2024-07-15 NOTE — TELEPHONE ENCOUNTER
Initiated Left L5 transforaminal epidural steroid injection CPT Code: 17344 & Dx: M54.16 to be done at [facility] with site [facility].   Status: Approved  Tracking #[id number]

## 2024-07-16 NOTE — TELEPHONE ENCOUNTER
Patient has been scheduled for Left L5 transforaminal epidural steroid injection on 7/19/24 at the [facility] with Dr. Ambrosio.   Anesthesia type:  Local  Please note: The [facility] will call the business day prior to discuss the exact time/arrival and additional instructions for your appointment.  Patient was advised that if he/she does receive the covid vaccine it needs to be at least 2 weeks before or after the injection.  Medications and allergies reviewed.  Educated to hold NSAIDS (Aleve, Ibuprofen, Motrin, Advil) and anti-inflammatories (Meloxicam, Naproxen, Diclofenac, Celebrex) and for cervical injections must hold Multi-Vitamins, Vitamin E, Fish Oil/Omega-3.  Patient informed of [facility]'s  policy:  he/she will need a  to and from procedure and must be on site for their entirety of their visit, if their ride is unable to the procedure will be cancelled.   [facility] is located in the [facility] 1st floor [address], [place] [address].   may park in the yellow/purple parking lot.  Patient verbalized understanding and agrees with plan.  Scheduled in Epic: Yes  Scheduled in Surgical Case: Yes  Follow up appointment made: NOV: 10/15/2024 Fransisco Ambrosio MD

## 2024-07-24 ENCOUNTER — TELEPHONE (OUTPATIENT)
Dept: INTERNAL MEDICINE CLINIC | Facility: CLINIC | Age: 69
End: 2024-07-24

## 2024-07-24 NOTE — TELEPHONE ENCOUNTER
Patient called to give condition update after taking Furosemide for 1 week per physician recommendation.     Furosemide is working as long as she takes it every day but if she misses a day the swelling comes back in both legs.     Patient is happy to see her ankles again.     No new pain in either leg but the soreness has reduced since swelling has gone down.     Patient went to [facility] yesterday to complete labs ordered by Rheumatologist and was wondering if Dr. Croft can see results as well.     Call patient with any questions.

## 2024-07-25 NOTE — TELEPHONE ENCOUNTER
Spoke with patient and relayed MD's message. Verbalized understanding and agreement with plan.   Pt will provide our office with copy of lab result for PCP review.

## 2024-07-25 NOTE — TELEPHONE ENCOUNTER
I am unable to see recent [facility] labs ordered by rheumatologist Dr Grace    Glad to hear that leg swelling is responding to Lasix; advise keep Lasix at same dose for now    Please call pt

## 2024-08-21 RX ORDER — FLUTICASONE PROPIONATE 50 MCG
SPRAY, SUSPENSION (ML) NASAL
Qty: 48 G | Refills: 3 | Status: SHIPPED | OUTPATIENT
Start: 2024-08-21

## 2024-08-21 NOTE — TELEPHONE ENCOUNTER
Refill request is for a maintenance medication and has met the criteria specified in the Ambulatory Medication Refill Standing Order for eligibility, visits, laboratory, alerts and was sent to the requested pharmacy.    Requested Prescriptions     Signed Prescriptions Disp Refills    FLUTICASONE PROPIONATE 50 MCG/ACT Nasal Suspension 48 g 3     Sig: USE 2 SPRAYS NASALLY EVERY DAY     Authorizing Provider: FANTASMA LUCERO     Ordering User: ADI NOLASCO

## 2024-09-26 RX ORDER — NORTRIPTYLINE HCL 10 MG
20 CAPSULE ORAL NIGHTLY
Qty: 180 CAPSULE | Refills: 3 | Status: SHIPPED | OUTPATIENT
Start: 2024-09-26

## 2024-09-26 NOTE — TELEPHONE ENCOUNTER
Refill request is for a maintenance medication and has met the criteria specified in the Ambulatory Medication Refill Standing Order for eligibility, visits, laboratory, alerts and was sent to the requested pharmacy.    Requested Prescriptions     Signed Prescriptions Disp Refills    NORTRIPTYLINE 10 MG Oral Cap 180 capsule 3     Sig: TAKE 2 CAPSULES EVERY NIGHT     Authorizing Provider: FANTASMA LUCERO     Ordering User: SCOTT REESE

## 2024-10-15 ENCOUNTER — OFFICE VISIT (OUTPATIENT)
Dept: PHYSICAL MEDICINE AND REHAB | Facility: CLINIC | Age: 69
End: 2024-10-15
Payer: MEDICARE

## 2024-10-15 DIAGNOSIS — M54.16 LUMBAR RADICULOPATHY: Primary | ICD-10-CM

## 2024-10-15 DIAGNOSIS — M48.061 LUMBAR FORAMINAL STENOSIS: ICD-10-CM

## 2024-10-15 DIAGNOSIS — M65.332 ACQUIRED TRIGGER FINGER OF LEFT MIDDLE FINGER: ICD-10-CM

## 2024-10-15 DIAGNOSIS — C77.9 PRIMARY CANCER OF RIGHT BREAST WITH STAGE 2 NODAL METASTASIS PER AMERICAN JOINT COMMITTEE ON CANCER 7TH EDITION (N2) (HCC): ICD-10-CM

## 2024-10-15 DIAGNOSIS — I89.0 LYMPHEDEMA OF RIGHT ARM: ICD-10-CM

## 2024-10-15 DIAGNOSIS — C50.911 PRIMARY CANCER OF RIGHT BREAST WITH STAGE 2 NODAL METASTASIS PER AMERICAN JOINT COMMITTEE ON CANCER 7TH EDITION (N2) (HCC): ICD-10-CM

## 2024-10-15 DIAGNOSIS — J44.89 ASTHMA WITH COPD (CHRONIC OBSTRUCTIVE PULMONARY DISEASE) (HCC): Chronic | ICD-10-CM

## 2024-10-15 DIAGNOSIS — M51.9 LUMBAR DISC DISEASE: ICD-10-CM

## 2024-10-15 DIAGNOSIS — M43.16 SPONDYLOLISTHESIS OF LUMBAR REGION: ICD-10-CM

## 2024-10-15 DIAGNOSIS — C50.911 STAGE I BREAST CANCER, RIGHT (HCC): ICD-10-CM

## 2024-10-15 DIAGNOSIS — M51.369 ANNULAR TEAR OF LUMBAR DISC: ICD-10-CM

## 2024-10-15 PROCEDURE — 1160F RVW MEDS BY RX/DR IN RCRD: CPT | Performed by: PHYSICAL MEDICINE & REHABILITATION

## 2024-10-15 PROCEDURE — 1159F MED LIST DOCD IN RCRD: CPT | Performed by: PHYSICAL MEDICINE & REHABILITATION

## 2024-10-15 PROCEDURE — 99214 OFFICE O/P EST MOD 30 MIN: CPT | Performed by: PHYSICAL MEDICINE & REHABILITATION

## 2024-10-15 NOTE — PROGRESS NOTES
Low Back Pain H & P    Chief Complaint:   Chief Complaint   Patient presents with    Follow - Up     LOV 7/8/24. F/U after middle finger injection, hip xray, and hip injection. FINGERS: 0/10 pain. Admits weakness in left hand. Denies N/T. No tx. No pain medications. LEFT HIP: 3/10 pain. Radiating pain down both legs. Denies N/T. Denies weakness. No pain medications. No PT or HEP but patient is active.      Nursing note reviewed and verified.    Patient was last seen on 7/8/2024.  I did the bilateral 3rd finger trigger finger injections at that time which helped her 100%.  She has recently had some catching of the left 3rd finger.  On 7/19/2024,  I did the left L5 TFESI which helped her 100%.  Then over the last week to 10 days, she developed left lateral hip pain which is worse after she has been sitting for a while and goes from sitting to standing.  She will have weakness and pain.  Then today when she was walking the dog, she developed right posterior thigh pain that radiated into the right buttock.  The right left pain is resolved now, but she still has the left lateral hip pain.  She denies numbness and tingling.  She will have leg weakness when she has the pain.    Past Medical History   Past Medical History:    Asthma (HCC)    Breast cancer (HCC)    right breast    Esophageal reflux    Extrinsic asthma, unspecified    Fibromyalgia    Hiatal hernia with GERD    High blood pressure    Insomnia    Osteopenia    Peripheral neuropathy    Raynaud's phenomenon    SLE (systemic lupus erythematosus) ([facility])    Dr Valadez    Stress fracture of the metatarsals    4th MT       Past Surgical History   Past Surgical History:   Procedure Laterality Date    Ankle fracture surgery Left     Cholecystectomy  1998    laparoscopic     Colonoscopy  12/12/2014    Electrocardiogram, complete      Scanned to media tab - DOS 04-    Hemorrhoidectomy      Hysterectomy  1992    laparoscopic (endometriosis)    Kaveh biopsy stereo  nodule 1 site right (cpt=19081)  09/26/2018    Kaveh localization wire 1 site right (cpt=19281)  1992    Mastectomy right  11/01/2018    Other surgical history  11/01/2018    right breast mastecetomy    Radiation right      12/31/18-2/2019       Family History   Family History   Problem Relation Age of Onset    Prostate Cancer Father 57        d.62 metastatic    Heart Attack Father         MI    Cancer Mother 27        thyroid; lung @ 63 (smoker)    Heart Disease Mother     Cancer Maternal Grandmother 45        cervical/uterine    Cancer Maternal Aunt 75        pancreatic ca; fmr smoker; d.75    Pancreatic Cancer Maternal Aunt 75    Cancer Sister 61        thyroid ca; sx only    Prostate Cancer Brother 49        surgery    Cancer Maternal Uncle 40        bladder ca, non-smoker    Cancer Maternal Grandfather 60        lymphoma    Cancer Paternal Grandfather         brain ca; dx on autopsy; d.55    Other (drug addict) Son     Cancer Maternal Aunt 62        lung ca; smoker; d.62    Cancer Maternal Aunt 72        lung ca; smoker; d.72    Cancer Maternal Cousin Female 51        thyroid ca    Breast Cancer Maternal Cousin Female 70    Prostate Cancer Paternal Uncle 75        seeds    Breast Cancer Self 63       Social History   Social History     Socioeconomic History    Marital status:      Spouse name: Not on file    Number of children: 2    Years of education: Not on file    Highest education level: Not on file   Occupational History    Not on file   Tobacco Use    Smoking status: Never    Smokeless tobacco: Never   Vaping Use    Vaping status: Never Used   Substance and Sexual Activity    Alcohol use: No    Drug use: No    Sexual activity: Not on file   Other Topics Concern     Service Not Asked    Blood Transfusions Not Asked    Caffeine Concern Yes     Comment: Coffee 2 cups daily; Soda    Occupational Exposure Not Asked    Hobby Hazards Not Asked    Sleep Concern Not Asked    Stress Concern Not Asked     Weight Concern Not Asked    Special Diet Not Asked    Back Care Not Asked    Exercise Yes     Comment: walking    Bike Helmet Not Asked    Seat Belt Not Asked    Self-Exams Not Asked   Social History Narrative    The patient does not use an assistive device..      The patient does not live in a home with stairs.     Social Drivers of Health     Financial Resource Strain: Not on file   Food Insecurity: Not on file   Transportation Needs: Not on file   Physical Activity: Not on file   Stress: Not on file   Social Connections: Not on file   Housing Stability: Not on file       PE:  The patient does appear in her stated age in no distress.  The patient is well groomed.    Psychiatric:  The patient is alert and oriented x 3.  The patient has a normal affect and mood.      Respiratory:  No acute respiratory distress. Patient does not have a cough.    HEENT:  Extraocular muscles are intact. There is no kern icterus. Pupils are equal, round, and reactive to light. No redness or discharge bilaterally.    Skin:  There are no rashes or lesions.    Vitals:  There were no vitals filed for this visit.    Gait:    Gait: Normal gait   Sit to Stand: no difficulty      Lumbar Spine:    Scoliosis: No scoliosis present     Lumbar Spine Palpation:    Spinous Processes: Tender at L4 and L5   Z-joints: Tender at  right L5-S1   SIJ: Tender at bilateral SIJ   Piriformis Muscle: Tender at left Piriformis muscle(s)   Greater Trochanteric Bursa: Tender at bilateral Greater Trochanteric Bursa(s)     Vascular lower extremity:   Dorsalis pedis pulse-RIGHT 2+   Dorsalis pedis pulse-LEFT 2+   Tibialis posterior pulse-RIGHT 2+   Tibialis posterior pulse-LEFT 2+     Neurological Lower Extremity:    Light Touch Sensation: Intact in bilateral Lower Extremities   LE Muscle Strength: All LE strength measurements 5/5 except:  Hamstring RIGHT:   4/5  Hamstring LEFT:   4/5  Extensor Hallucis Longus LEFT:   4/5   RIGHT plantar reflexes: downward response    LEFT plantar reflexes: downward response   Reflexes: 2+ in bilateral lower extremities     Left hand:  3rd finger mild catching with finger extension    Assessment  1. Acquired trigger finger of left middle finger    2. L3-4 grade 1, L4-5 grade 1 spondylolisthesis    3. left L5-S1 radiculopathy    4. L4-5 right mild/left mild-moderate, L3-4 left mild foraminal stenosis    5. L1-2 right mild, L3-4 mild diffuse & left mild-mod far lateral, L4-5 mild-mod central & bilateral mod far lateral, L5-S1 left mild-mod foraminal & mild central bulging discs    6. Annular tear of lumbar disc: L4-5 left far lateral    7. Stage I breast cancer, right (HCC)    8. Primary cancer of right breast with stage 2 sheila metastasis per American Joint Committee on Cancer 7th edition (N2) ([facility])    9. Asthma with COPD (chronic obstructive pulmonary disease) ([facility])    10. Lymphedema of right arm         Plan  She will get into the PT for the lumbar spine.    If the PT is not helping , then she will need to have bilateral L5 TFESI's.    She will continue with the gabapentin with 300 mg in the morning and 600 mg at night.    She will let me know how she is doing after she has had one month of the PT.    She will follow up in 3-4 months or sooner if needed.    The patient understands and agrees with the stated plan.  Fransisco Ambrosio MD  10/15/2024

## 2024-10-15 NOTE — PATIENT INSTRUCTIONS
Plan  She will get into the PT for the lumbar spine.    If the PT is not helping , then she will need to have bilateral L5 TFESI's.    She will continue with the gabapentin with 300 mg in the morning and 600 mg at night.    She will let me know how she is doing after she has had one month of the PT.    She will follow up in 3-4 months or sooner if needed.

## 2024-10-23 ENCOUNTER — TELEPHONE (OUTPATIENT)
Dept: HEMATOLOGY/ONCOLOGY | Facility: HOSPITAL | Age: 69
End: 2024-10-23

## 2024-10-23 NOTE — TELEPHONE ENCOUNTER
Called patient to schedule an appointment for a follow up with Dr. Beckham, former patient of Dr. Lazo and she would like to wait until her Bone Density and her mammogram are completed per patient. Called 10/23/24.

## 2024-12-04 DIAGNOSIS — G62.9 PERIPHERAL POLYNEUROPATHY: Primary | ICD-10-CM

## 2024-12-04 RX ORDER — GABAPENTIN 300 MG/1
CAPSULE ORAL
Qty: 450 CAPSULE | Refills: 3 | Status: SHIPPED | OUTPATIENT
Start: 2024-12-04

## 2024-12-04 NOTE — TELEPHONE ENCOUNTER
Refill request is for a maintenance medication and has met the criteria specified in the Ambulatory Medication Refill Standing Order for eligibility, visits, laboratory, alerts and was sent to the requested pharmacy.    Requested Prescriptions     Signed Prescriptions Disp Refills    GABAPENTIN 300 MG Oral Cap 450 capsule 3     Sig: TAKE 2 CAPSULES EVERY MORNING AND TAKE 3 CAPSULES EVERY EVENING     Authorizing Provider: FANTASMA LUCERO     Ordering User: ADI NOLASCO

## 2024-12-05 ENCOUNTER — HOSPITAL ENCOUNTER (OUTPATIENT)
Dept: BONE DENSITY | Age: 69
Discharge: HOME | End: 2024-12-05
Attending: INTERNAL MEDICINE
Payer: MEDICARE

## 2024-12-05 ENCOUNTER — HOSPITAL ENCOUNTER (OUTPATIENT)
Dept: MAMMOGRAPHY | Age: 69
Discharge: HOME | End: 2024-12-05
Attending: INTERNAL MEDICINE
Payer: MEDICARE

## 2024-12-05 DIAGNOSIS — Z79.899 ENCOUNTER FOR MONITORING ADJUVANT HORMONAL THERAPY: ICD-10-CM

## 2024-12-05 DIAGNOSIS — Z78.0 MENOPAUSE: ICD-10-CM

## 2024-12-05 DIAGNOSIS — Z92.3 HISTORY OF RADIATION THERAPY: ICD-10-CM

## 2024-12-05 DIAGNOSIS — Z12.31 ENCOUNTER FOR SCREENING MAMMOGRAM FOR BREAST CANCER: ICD-10-CM

## 2024-12-05 DIAGNOSIS — Z51.81 ENCOUNTER FOR MONITORING ADJUVANT HORMONAL THERAPY: ICD-10-CM

## 2024-12-05 PROCEDURE — 77063 BREAST TOMOSYNTHESIS BI: CPT | Performed by: INTERNAL MEDICINE

## 2024-12-05 PROCEDURE — 77080 DXA BONE DENSITY AXIAL: CPT | Performed by: INTERNAL MEDICINE

## 2024-12-05 PROCEDURE — 77067 SCR MAMMO BI INCL CAD: CPT | Performed by: INTERNAL MEDICINE

## 2024-12-18 LAB
ABSOLUTE BASOPHILS: 49 CELLS/UL (ref 0–200)
ABSOLUTE EOSINOPHILS: 123 CELLS/UL (ref 15–500)
ABSOLUTE LYMPHOCYTES: 2321 CELLS/UL (ref 850–3900)
ABSOLUTE MONOCYTES: 426 CELLS/UL (ref 200–950)
ABSOLUTE NEUTROPHILS: 5281 CELLS/UL (ref 1500–7800)
ALBUMIN/GLOBULIN RATIO: 1.6 (CALC) (ref 1–2.5)
ALBUMIN: 4.4 G/DL (ref 3.6–5.1)
ALKALINE PHOSPHATASE: 76 U/L (ref 37–153)
ALT: 16 U/L (ref 6–29)
AST: 20 U/L (ref 10–35)
BASOPHILS: 0.6 %
BILIRUBIN, TOTAL: 0.2 MG/DL (ref 0.2–1.2)
BUN: 10 MG/DL (ref 7–25)
CALCIUM: 9.4 MG/DL (ref 8.6–10.4)
CARBON DIOXIDE: 32 MMOL/L (ref 20–32)
CHLORIDE: 100 MMOL/L (ref 98–110)
CHOL/HDLC RATIO: 3.5 (CALC)
CHOLESTEROL, TOTAL: 214 MG/DL
CREATININE: 0.86 MG/DL (ref 0.5–1.05)
EGFR: 74 ML/MIN/1.73M2
EOSINOPHILS: 1.5 %
GLOBULIN: 2.7 G/DL (CALC) (ref 1.9–3.7)
GLUCOSE: 180 MG/DL (ref 65–99)
HDL CHOLESTEROL: 62 MG/DL
HEMATOCRIT: 40.2 % (ref 35–45)
HEMOGLOBIN: 13.2 G/DL (ref 11.7–15.5)
LDL-CHOLESTEROL: 124 MG/DL (CALC)
LYMPHOCYTES: 28.3 %
MCH: 29.5 PG (ref 27–33)
MCHC: 32.8 G/DL (ref 32–36)
MCV: 89.7 FL (ref 80–100)
MONOCYTES: 5.2 %
MPV: 9.7 FL (ref 7.5–12.5)
NEUTROPHILS: 64.4 %
NON-HDL CHOLESTEROL: 152 MG/DL (CALC)
PLATELET COUNT: 343 THOUSAND/UL (ref 140–400)
POTASSIUM: 4.3 MMOL/L (ref 3.5–5.3)
PROTEIN, TOTAL: 7.1 G/DL (ref 6.1–8.1)
RDW: 11.8 % (ref 11–15)
RED BLOOD CELL COUNT: 4.48 MILLION/UL (ref 3.8–5.1)
SODIUM: 140 MMOL/L (ref 135–146)
TRIGLYCERIDES: 160 MG/DL
TSH W/REFLEX TO FT4: 1.42 MIU/L (ref 0.4–4.5)
VITAMIN D, 25-OH, TOTAL: 61 NG/ML (ref 30–100)
WHITE BLOOD CELL COUNT: 8.2 THOUSAND/UL (ref 3.8–10.8)

## 2024-12-19 ENCOUNTER — OFFICE VISIT (OUTPATIENT)
Age: 69
End: 2024-12-19
Attending: INTERNAL MEDICINE
Payer: MEDICARE

## 2024-12-19 VITALS
RESPIRATION RATE: 18 BRPM | WEIGHT: 194.13 LBS | HEIGHT: 61 IN | DIASTOLIC BLOOD PRESSURE: 68 MMHG | SYSTOLIC BLOOD PRESSURE: 144 MMHG | OXYGEN SATURATION: 98 % | BODY MASS INDEX: 36.65 KG/M2 | HEART RATE: 75 BPM | TEMPERATURE: 98 F

## 2024-12-19 DIAGNOSIS — C50.911 MALIGNANT NEOPLASM OF RIGHT BREAST IN FEMALE, ESTROGEN RECEPTOR POSITIVE, UNSPECIFIED SITE OF BREAST (HCC): Primary | ICD-10-CM

## 2024-12-19 DIAGNOSIS — Z90.11 H/O RIGHT MASTECTOMY: ICD-10-CM

## 2024-12-19 DIAGNOSIS — Z12.31 ENCOUNTER FOR SCREENING MAMMOGRAM FOR BREAST CANCER: ICD-10-CM

## 2024-12-19 DIAGNOSIS — Z08 ENCOUNTER FOR FOLLOW-UP SURVEILLANCE OF BREAST CANCER: ICD-10-CM

## 2024-12-19 DIAGNOSIS — Z51.81 ENCOUNTER FOR MONITORING AROMATASE INHIBITOR THERAPY: ICD-10-CM

## 2024-12-19 DIAGNOSIS — I89.0 LYMPHEDEMA OF RIGHT ARM: ICD-10-CM

## 2024-12-19 DIAGNOSIS — Z17.0 MALIGNANT NEOPLASM OF RIGHT BREAST IN FEMALE, ESTROGEN RECEPTOR POSITIVE, UNSPECIFIED SITE OF BREAST (HCC): Primary | ICD-10-CM

## 2024-12-19 DIAGNOSIS — Z78.0 OSTEOPENIA AFTER MENOPAUSE: ICD-10-CM

## 2024-12-19 DIAGNOSIS — Z85.3 ENCOUNTER FOR FOLLOW-UP SURVEILLANCE OF BREAST CANCER: ICD-10-CM

## 2024-12-19 DIAGNOSIS — M85.80 OSTEOPENIA AFTER MENOPAUSE: ICD-10-CM

## 2024-12-19 DIAGNOSIS — Z79.811 ENCOUNTER FOR MONITORING AROMATASE INHIBITOR THERAPY: ICD-10-CM

## 2024-12-19 NOTE — PROGRESS NOTES
HPI   Gemini Mendoza is a 69 year old female here for follow up of Malignant neoplasm of right breast in female, estrogen receptor positive, unspecified site of breast (HCC)    Encounter for monitoring aromatase inhibitor therapy    Encounter for follow-up surveillance of breast cancer    Osteopenia after menopause    H/O right mastectomy    BMI 36.0-36.9,adult    Lymphedema of right arm    Encounter for screening mammogram for breast cancer.    Compliance with SBE: Yes. Changes on SBE: No.     Compliance with letrozole:  compliance all of the time    Side effects from letrozole: No hot flashes states have resolved with magnesium, No arthralgias or myalgias.  Does have trigger fingers and is following with Dr. Ambrosio and resolves with injections.     Has been gaining weight.  Energy not as great due weight gain.    ECOG PS: 0      Review of Systems:     Review of Systems   Constitutional:  Negative for appetite change, fatigue and unexpected weight change.   Respiratory:  Negative for cough and shortness of breath.         If takes a very deep breath the ribs hurt R>L.   Cardiovascular:  Negative for chest pain.   Gastrointestinal:  Negative for abdominal pain.        Epigastric pain, has GERD and takes pantoprazole, still has symptoms   Endocrine: Negative for hot flashes.   Genitourinary:  Positive for frequency (when takes water pills). Negative for dysuria.    Musculoskeletal:  Positive for back pain (LBP, follows with Dr. Ambrosio). Negative for arthralgias and neck pain.   Neurological:  Negative for dizziness and headaches.   Hematological:  Negative for adenopathy.   Psychiatric/Behavioral:  Negative for sleep disturbance.          Current Outpatient Medications   Medication Sig Dispense Refill    gabapentin 300 MG Oral Cap TAKE 2 CAPSULES EVERY MORNING AND TAKE 3 CAPSULES EVERY EVENING 450 capsule 3    NORTRIPTYLINE 10 MG Oral Cap TAKE 2 CAPSULES EVERY NIGHT 180 capsule 3    FLUTICASONE PROPIONATE 50  MCG/ACT Nasal Suspension USE 2 SPRAYS NASALLY EVERY DAY 48 g 3    metoprolol succinate ER 50 MG Oral Tablet 24 Hr Take 1 tablet (50 mg total) by mouth daily. 90 tablet 3    furosemide 20 MG Oral Tab Take 1 tablet (20 mg total) by mouth daily as needed (leg edema). 90 tablet 1    Acetaminophen (TYLENOL EXTRA STRENGTH OR) Take by mouth.      pantoprazole 40 MG Oral Tab EC Take 1 tablet (40 mg total) by mouth before breakfast. 90 tablet 3    letrozole 2.5 MG Oral Tab Take 1 tablet (2.5 mg total) by mouth daily. 90 tablet 3    magnesium oxide 400 MG Oral Tab Take 1 tablet (400 mg total) by mouth daily. 90 tablet 3    Hydroxychloroquine Sulfate (PLAQUENIL) 200 MG Oral Tab Take 1 tablet (200 mg total) by mouth daily.      [employer] VIT D 5000 HIGH-POTENCY 5000 UNIT OR CAPS 1 CAPSULE DAILY       Allergies:   Allergies[1]    Past Medical History:    Asthma (HCC)    Breast cancer (HCC)    right breast    Esophageal reflux    Extrinsic asthma, unspecified    Fibromyalgia    Hiatal hernia with GERD    High blood pressure    Insomnia    Osteopenia    Peripheral neuropathy    Raynaud's phenomenon    SLE (systemic lupus erythematosus) ([facility])    Dr Valadez    Stress fracture of the metatarsals    4th MT     Past Surgical History:   Procedure Laterality Date    Ankle fracture surgery Left     Cholecystectomy  1998    laparoscopic     Colonoscopy  12/12/2014    Electrocardiogram, complete      Scanned to media tab - DOS 04-    Hemorrhoidectomy      Hysterectomy  1992    laparoscopic (endometriosis)    Kaveh biopsy stereo nodule 1 site right (cpt=19081)  09/26/2018    Kaveh localization wire 1 site right (cpt=19281)  1992    Mastectomy right  11/01/2018    Other surgical history  11/01/2018    right breast mastecetomy    Radiation right      12/31/18-2/2019     Social History     Socioeconomic History    Marital status:     Number of children: 2   Tobacco Use    Smoking status: Never    Smokeless tobacco: Never   Vaping Use     Vaping status: Never Used   Substance and Sexual Activity    Alcohol use: No    Drug use: No   Other Topics Concern    Caffeine Concern Yes     Comment: Coffee 2 cups daily; Soda    Exercise Yes     Comment: walking   Social History Narrative    The patient does not use an assistive device..      The patient does not live in a home with stairs.       Family History   Problem Relation Age of Onset    Prostate Cancer Father 57        d.62 metastatic    Heart Attack Father         MI    Cancer Mother 27        thyroid; lung @ 63 (smoker)    Heart Disease Mother     Cancer Maternal Grandmother 45        cervical/uterine    Cancer Maternal Aunt 75        pancreatic ca; fmr smoker; d.75    Pancreatic Cancer Maternal Aunt 75    Cancer Sister 61        thyroid ca; sx only    Prostate Cancer Brother 49        surgery    Cancer Maternal Uncle 40        bladder ca, non-smoker    Cancer Maternal Grandfather 60        lymphoma    Cancer Paternal Grandfather         brain ca; dx on autopsy; d.55    Other (drug addict) Son     Cancer Maternal Aunt 62        lung ca; smoker; d.62    Cancer Maternal Aunt 72        lung ca; smoker; d.72    Cancer Maternal Cousin Female 51        thyroid ca    Breast Cancer Maternal Cousin Female 70    Prostate Cancer Paternal Uncle 75        seeds    Breast Cancer Self 61         PHYSICAL EXAM:    /68 (BP Location: Left arm, Patient Position: Sitting, Cuff Size: large)   Pulse 75   Temp 97.7 °F (36.5 °C) (Oral)   Resp 18   Ht 1.549 m (5' 1\")   Wt 88 kg (194 lb 1.6 oz)   SpO2 98%   BMI 36.67 kg/m²   Wt Readings from Last 6 Encounters:   12/19/24 88 kg (194 lb 1.6 oz)   07/16/24 82.6 kg (182 lb)   07/10/24 82.8 kg (182 lb 9.6 oz)   07/08/24 81.6 kg (180 lb)   03/22/24 80.4 kg (177 lb 3.2 oz)   03/05/24 79.8 kg (176 lb)     Physical Exam  General: Patient is alert, not in acute distress.  HEENT: EOMs intact. PERRL.   Neck: No JVD. No palpable lymphadenopathy. Neck is supple.  Chest: Clear  to auscultation.  Breasts:  R chest with mastectomy and RT changes.  L breast no masses.    Heart: Regular rate and rhythm.   Abdomen: Soft, non tender with good bowel sounds.  Extremities: No edema. R arm with lymphedema.   Neurological: Grossly intact.   Lymphatics: There is no palpable lymphadenopathy throughout in the cervical, supraclavicular, axillary, or inguinal regions.  Psych/Depression: nl        ASSESSMENT/PLAN:     1. Malignant neoplasm of right breast in female, estrogen receptor positive, unspecified site of breast (HCC)    2. Encounter for monitoring aromatase inhibitor therapy    3. Encounter for follow-up surveillance of breast cancer    4. Osteopenia after menopause    5. H/O right mastectomy    6. BMI 36.0-36.9,adult    7. Lymphedema of right arm    8. Encounter for screening mammogram for breast cancer      Patient is a post menopausal female status post right mastectomy and axillary lymph node dissection 11/1/2018 for hormone receptor positive HER-2 negative stage IIB (PT2N1) grade 2 infiltrating ductal carcinoma.  Oncotype recurrence score was 11.  One sentinel lymph node was positive.  On axillary lymph node dissection 0 of 16 lymph nodes were involved. She has underlying history of SLE She has history of osteopenia/osteoporosis.  She is on methotrexate prednisone and Plaquenil chronically    -Based on her Oncotype score do not recommend adjuvant chemotherapy  -She completed adjuvant radiotherapy in February 2019  - Patient has been on adjuvant endocrine therapy with letrozole since February 2019.  Discussed with the patient she has completed 5 years of treatment in February 2024.  She is currently completing 6 years of therapy.  Discussed with the patient the current guidelines do recommend consideration for up to 8 to 10 years of therapy.  She did have a low risk of recurrence score for Oncotype.  I did discuss with the patient consideration for breast cancer index to determine if she has  additional benefit from continuation of adjuvant endocrine therapy.  If she does not have any additional benefit, recommend discontinuation of letrozole.  She has been tolerating letrozole well.    -Patient had left breast mammogram on 12/5/2024, which was BI-RADS 2, and due in 1 year which will be in December 2025.    -Osteopenia.  Patient had DEXA on 12/5/2024, with continued worsening osteopenia in the left femoral neck and left hip.  AP lumbar spine is now at the normal range.  Patient had vitamin D levels performed yesterday which were normal.  She is to continue to take vitamin D and calcium supplementation, as well as weightbearing exercise.  She will be due for repeat DEXA in December 2026.  Recommend evaluation by bone health clinic with endocrinology for further management.    --Lymphedema of the RUE:  exacerbated, recommend PT.  Use pump once manually reduced for maintenance and wear sleeve.    -BMI 36: Referral to weight loss clinic.    MDM moderate risk  Continuity of complex medical care.   No orders of the defined types were placed in this encounter.      Results From Past 48 Hours:  No results found for this or any previous visit (from the past 48 hours).      Imaging & Referrals:  BARIATRICS - INTERNAL  OP REFERRAL TO OT/PT LYMPHEDEMA CLINIC  ENDOCRINOLOGY - INTERNAL  [facility] JOANN 2D+3D SCREENING LEFT (CPT=[id number])   Orders Placed This Encounter   Procedures    [facility] - Dr. Eliceo Natarajan [facility] 9    Lymphedema Clinic Referral:  PT/OT  Evaluate & Treat Lymphedema    ENDOCRINOLOGY - INTERNAL           Component  Ref Range & Units 12/17/24 1338   VITAMIN D, 25-OH, TOTAL  30 - 100 ng/mL 61    Comment: Vitamin D Status         25-OH Vitamin D:       Narrative   PROCEDURE: XR DEXA BONE DENSITOMETRY (CPT=77080)      COMPARISON: [facility], XR DEXA BONE DENSITOMETRY (CPT=77080), 10/18/2022, 9:30 AM.      INDICATIONS: Z78.0  Menopause Z12.31 Encounter for screening mammogram for breast cancer Z79.899 Encounter for monitoring adjuvant hormonal therapy Z51.81 Encounter for bob*      TECHNIQUE: Measurement of bone mineral density of the lumbar spine and hip was performed on a [employer] dual energy x-ray absorptiometry scanner.      FINDINGS:      LEFT FEMORAL NECK   BMD: 0.585 gm/sq. cm. T SCORE: -2.4 Z SCORE: -0.7      LEFT TOTAL HIP   BMD: 0.815 gm/sq. cm. T SCORE: -1.0 Z SCORE: 0.3      PA LUMBAR SPINE (L1 - L4)   BMD: 0.985 gm/sq. cm. T SCORE: -0.6 Z SCORE: 1.4         T scores are a comparison to sex-matched patients with mean peak bone mass and are given in standard deviation (s.d.).  Each 1 s.d. corresponds to approximately 10% below peak normal bone density.       WORLD HEALTH ORGANIZATION CRITERIA   NORMAL T SCORE: Above -1 s.d.    OSTEOPENIA T SCORE: Between -1 and -2.5 s.d.    OSTEOPOROSIS T SCORE: -2.5 s.d.      National Osteoporosis Foundation Clinician's Guide to Prevention and Treatment of Osteoporosis recommendations for treatment:   Post menopausal women and men age 50 and older presenting with the following should be considered for treatment:   * A hip or vertebral (clinical or morphometric) fracture   * T score < -2.5 at the femoral neck or spine after appropriate evaluation to exclude secondary causes.   * Low bone mass (T score between -1.0 and -2.5 at the femoral neck or spine) and a 10-year probability of a hip fracture > 3% or a 10-year probability of a major osteoporosis-related fracture > 20% based on the [place]-adapted WHO algorithm                Impression   CONCLUSION:   1. Findings in the left femoral neck suggest osteopenia, there may be increased fracture risk.  There has been decrease in the BMD by 10.1% from previous study.  Findings in the total left hip suggest osteopenia, there may be increased fracture risk.    There has been decrease in the BMD by 3.1% from previous study.  The 10 year fracture risk for  major osteoporotic fracture is 12%, and for hip fracture is 2.3%.   2. Findings in the total AP lumbar spine are in the normal range, there is no increased fracture risk.  There has been decrease in the BMD by 4.8% from previous study.            Dictated by (CST): Trung Messer MD on 12/06/2024 at 9:39 AM       Finalized by (CST): Trung Messer MD on 12/06/2024 at 9:45 AM              PROCEDURE: [facility] JOANN 2D+3D SCREENING LEFT ([id number])      COMPARISON: [facility], [facility] JOANN 2D+3D DIAGNOSTIC ADDL VWS  LEFT (FMO=[id number]), 9/18/2020, 9:10 AM.  [facility], [facility] JOANN 2D+3D SCREENING LEFT ([id number]), 9/13/2021, 1:39 PM.  [facility], [facility] JOANN 2D+3D SCREENING LEFT ([id number]), 10/18/2022, 9:14 AM.  [facility], [facility] JOANN 2D+3D SCREENING LEFT ([id number]), 11/17/2023, 2:39 PM.      INDICATIONS: Z12.31 Encounter for screening mammogram for breast cancer      TECHNIQUE: Full field direct screening mammography was performed and images were reviewed with the [employer] YAMILETH 1.5.1.5 CAD device.  3D tomosynthesis was performed and reviewed         BREAST COMPOSITION:   Category c-Heterogeneously dense, which may obscure small masses.         FINDINGS:      There are benign-appearing masses and calcifications. There is no suspicious mass, architectural distortion, or microcalcifications identified in left breast.  History of right mastectomy.                  Impression  CONCLUSION:        No mammographic evidence of breast malignancy.  As long as clinical examination remains benign, annual screening mammogram is recommended in 1 year.            BI-RADS CATEGORY:     DIAGNOSTIC CATEGORY 2 - BENIGN FINDING:       RECOMMENDATIONS:   ROUTINE MAMMOGRAM AND CLINICAL EVALUATION IN 12 MONTHS.                   PLEASE NOTE: NORMAL MAMMOGRAM DOES NOT EXCLUDE THE POSSIBILITY OF BREAST CANCER.  A CLINICALLY SUSPICIOUS PALPABLE LUMP SHOULD  BE BIOPSIED.       For patients over the age of 40, the target due date for the patient's next mammogram has been entered into a reminder system.       Patient received a discharge summary from the technologist after completion of exam.      Breast marker legend used on images    Triangle = Palpable lump   Erie = Skin tag or mole   BB = Nipple   Linear aaron = Scar   Square = Pain            Dictated by (CST): Gisselle Martinez MD on 12/06/2024 at 12:01 PM       Finalized by (CST): Gisselle Martinez MD on 12/06/2024 at 12:04 PM           [1]   Allergies  Allergen Reactions    Lymphazurin [Isosulfan Blue] ANAPHYLAXIS    Elavil [Amitriptyline Hcl] OTHER (SEE COMMENTS)     Altered mental state    Flexeril [Cyclobenzaprine Hcl] OTHER (SEE COMMENTS)     Altered mental state    Nabumetone      Other reaction(s): Anaphylaxis    Relafen ANAPHYLAXIS    Cephalexin NAUSEA ONLY

## 2024-12-26 RX ORDER — PANTOPRAZOLE SODIUM 40 MG/1
40 TABLET, DELAYED RELEASE ORAL
Qty: 90 TABLET | Refills: 3 | OUTPATIENT
Start: 2024-12-26

## 2024-12-26 NOTE — TELEPHONE ENCOUNTER
Current refill request refused due to refill is either a duplicate request or has active refills at the pharmacy.  Check previous templates.    Requested Prescriptions     Refused Prescriptions Disp Refills    PANTOPRAZOLE 40 MG Oral Tab EC [Pharmacy Med Name: Pantoprazole Sodium Oral Tablet Delayed Release 40 MG] 90 tablet 3     Sig: TAKE 1 TABLET BEFORE BREAKFAST     Refused By: SCOTT REESE     Reason for Refusal: Patient has requested refill too soon      sent 3/8/24 with enough refills for one year, too early

## 2024-12-27 ENCOUNTER — LAB REQUISITION (OUTPATIENT)
Dept: LAB | Facility: HOSPITAL | Age: 69
End: 2024-12-27
Payer: MEDICARE

## 2024-12-27 DIAGNOSIS — C50.211 MALIGNANT NEOPLASM OF UPPER-INNER QUADRANT OF RIGHT FEMALE BREAST (HCC): ICD-10-CM

## 2024-12-27 PROCEDURE — 88363 XM ARCHIVE TISSUE MOLEC ANAL: CPT | Performed by: INTERNAL MEDICINE

## 2025-01-02 ENCOUNTER — TELEPHONE (OUTPATIENT)
Dept: INTERNAL MEDICINE CLINIC | Facility: CLINIC | Age: 70
End: 2025-01-02

## 2025-01-02 RX ORDER — FUROSEMIDE 20 MG/1
20 TABLET ORAL EVERY OTHER DAY
Qty: 45 TABLET | Refills: 3 | Status: SHIPPED | OUTPATIENT
Start: 2025-01-02

## 2025-01-13 ENCOUNTER — TELEPHONE (OUTPATIENT)
Dept: INTERNAL MEDICINE CLINIC | Facility: CLINIC | Age: 70
End: 2025-01-13

## 2025-01-13 ENCOUNTER — OFFICE VISIT (OUTPATIENT)
Dept: INTERNAL MEDICINE CLINIC | Facility: CLINIC | Age: 70
End: 2025-01-13
Payer: MEDICARE

## 2025-01-13 VITALS
TEMPERATURE: 98 F | SYSTOLIC BLOOD PRESSURE: 112 MMHG | HEIGHT: 61 IN | DIASTOLIC BLOOD PRESSURE: 86 MMHG | WEIGHT: 191 LBS | BODY MASS INDEX: 36.06 KG/M2 | OXYGEN SATURATION: 99 % | HEART RATE: 77 BPM

## 2025-01-13 DIAGNOSIS — M65.331 TRIGGER MIDDLE FINGER OF RIGHT HAND: ICD-10-CM

## 2025-01-13 DIAGNOSIS — E55.9 VITAMIN D DEFICIENCY: ICD-10-CM

## 2025-01-13 DIAGNOSIS — R51.9 CHRONIC NONINTRACTABLE HEADACHE, UNSPECIFIED HEADACHE TYPE: ICD-10-CM

## 2025-01-13 DIAGNOSIS — E78.00 HYPERCHOLESTEREMIA: ICD-10-CM

## 2025-01-13 DIAGNOSIS — I10 BENIGN HYPERTENSION: ICD-10-CM

## 2025-01-13 DIAGNOSIS — M32.9 SYSTEMIC LUPUS ERYTHEMATOSUS, UNSPECIFIED SLE TYPE, UNSPECIFIED ORGAN INVOLVEMENT STATUS (HCC): ICD-10-CM

## 2025-01-13 DIAGNOSIS — D22.9 ATYPICAL NEVUS: ICD-10-CM

## 2025-01-13 DIAGNOSIS — Z00.00 PHYSICAL EXAM, ANNUAL: Primary | ICD-10-CM

## 2025-01-13 DIAGNOSIS — K21.9 GASTROESOPHAGEAL REFLUX DISEASE, UNSPECIFIED WHETHER ESOPHAGITIS PRESENT: ICD-10-CM

## 2025-01-13 DIAGNOSIS — C50.911 MALIGNANT NEOPLASM OF RIGHT BREAST IN FEMALE, ESTROGEN RECEPTOR POSITIVE, UNSPECIFIED SITE OF BREAST (HCC): ICD-10-CM

## 2025-01-13 DIAGNOSIS — Z12.11 COLON CANCER SCREENING: ICD-10-CM

## 2025-01-13 DIAGNOSIS — K64.8 INTERNAL HEMORRHOID: ICD-10-CM

## 2025-01-13 DIAGNOSIS — E66.01 SEVERE OBESITY (BMI 35.0-39.9) WITH COMORBIDITY (HCC): ICD-10-CM

## 2025-01-13 DIAGNOSIS — R60.9 EDEMA, UNSPECIFIED TYPE: ICD-10-CM

## 2025-01-13 DIAGNOSIS — M81.8 OTHER OSTEOPOROSIS, UNSPECIFIED PATHOLOGICAL FRACTURE PRESENCE: ICD-10-CM

## 2025-01-13 DIAGNOSIS — G89.29 CHRONIC NONINTRACTABLE HEADACHE, UNSPECIFIED HEADACHE TYPE: ICD-10-CM

## 2025-01-13 DIAGNOSIS — N28.1 KIDNEY CYSTS: ICD-10-CM

## 2025-01-13 DIAGNOSIS — Z86.59 HISTORY OF DEPRESSION: ICD-10-CM

## 2025-01-13 DIAGNOSIS — Z87.440 HISTORY OF UTI: ICD-10-CM

## 2025-01-13 DIAGNOSIS — G62.9 PERIPHERAL POLYNEUROPATHY: ICD-10-CM

## 2025-01-13 DIAGNOSIS — Z17.0 MALIGNANT NEOPLASM OF RIGHT BREAST IN FEMALE, ESTROGEN RECEPTOR POSITIVE, UNSPECIFIED SITE OF BREAST (HCC): ICD-10-CM

## 2025-01-13 DIAGNOSIS — I89.0 LYMPHEDEMA OF RIGHT ARM: ICD-10-CM

## 2025-01-13 DIAGNOSIS — M47.816 LUMBAR SPONDYLOSIS: ICD-10-CM

## 2025-01-13 RX ORDER — TIRZEPATIDE 2.5 MG/.5ML
2.5 INJECTION, SOLUTION SUBCUTANEOUS WEEKLY
Qty: 2 ML | Refills: 0 | Status: SHIPPED | OUTPATIENT
Start: 2025-01-13 | End: 2025-02-04

## 2025-01-13 RX ORDER — TIRZEPATIDE 7.5 MG/.5ML
7.5 INJECTION, SOLUTION SUBCUTANEOUS WEEKLY
Qty: 2 ML | Refills: 5 | Status: SHIPPED | OUTPATIENT
Start: 2025-03-10 | End: 2025-04-01

## 2025-01-13 RX ORDER — GABAPENTIN 300 MG/1
CAPSULE ORAL
Qty: 450 CAPSULE | Refills: 3 | Status: SHIPPED | OUTPATIENT
Start: 2025-01-13

## 2025-01-13 RX ORDER — TIRZEPATIDE 5 MG/.5ML
5 INJECTION, SOLUTION SUBCUTANEOUS WEEKLY
Qty: 2 ML | Refills: 0 | Status: SHIPPED | OUTPATIENT
Start: 2025-02-10 | End: 2025-03-04

## 2025-01-13 NOTE — TELEPHONE ENCOUNTER
Pt. Called to let Dr. Croft know that her insurance will not cover the Zepbound.  Pt. Can not afford $1100.00 a month.  Looking for an alternative.

## 2025-01-13 NOTE — PROGRESS NOTES
Gemini Mendoza is a 69 year old female.    HPI:     Chief Complaint   Patient presents with    Well Adult     Medicare annual   Reviewed Preventative/Wellness form with patient.    chief complaint: presents for both AWV and follow up for chronic conditions and/or medication refills,      68 y/o F here for subsequent Medicare annual wellness exam; has osteoporosis with DEXA in Dec 2024 showed T-score -2.4 in left femoral neck; seeing endocrinologist Dr Cisneros in Feb 2025; Reclast has been on hold since 2020 (had doses Jan 2018, March 2019);  no CP; no SOB; no headaches; no palpitation; frustrated with inability to lose weight; has appt with Weight Loss clinic in April 2025;         HISTORY:  Past Medical History:    Asthma (HCC)    Breast cancer (HCC)    right breast    Esophageal reflux    Extrinsic asthma, unspecified    Fibromyalgia    Hiatal hernia with GERD    High blood pressure    Insomnia    Osteopenia    Peripheral neuropathy    Raynaud's phenomenon    SLE (systemic lupus erythematosus) (HCC)    Dr Valadez    Stress fracture of the metatarsals    4th MT      Past Surgical History:   Procedure Laterality Date    Ankle fracture surgery Left     Cholecystectomy  1998    laparoscopic     Colonoscopy  12/12/2014    Electrocardiogram, complete      Scanned to media tab - DOS 04-    Hemorrhoidectomy      Hysterectomy  1992    laparoscopic (endometriosis)    Kaveh biopsy stereo nodule 1 site right (cpt=19081)  09/26/2018    Kaveh localization wire 1 site right (cpt=19281)  1992    Mastectomy right  11/01/2018    Other surgical history  11/01/2018    right breast mastecetomy    Radiation right      12/31/18-2/2019      Family History   Problem Relation Age of Onset    Prostate Cancer Father 57        d.62 metastatic    Heart Attack Father         MI    Cancer Mother 27        thyroid; lung @ 63 (smoker)    Heart Disease Mother     Cancer Maternal Grandmother 45        cervical/uterine    Cancer Maternal Aunt 75         pancreatic ca; fmr smoker; d.75    Pancreatic Cancer Maternal Aunt 75    Cancer Sister 61        thyroid ca; sx only    Prostate Cancer Brother 49        surgery    Cancer Maternal Uncle 40        bladder ca, non-smoker    Cancer Maternal Grandfather 60        lymphoma    Cancer Paternal Grandfather         brain ca; dx on autopsy; d.55    Other (drug addict) Son     Cancer Maternal Aunt 62        lung ca; smoker; d.62    Cancer Maternal Aunt 72        lung ca; smoker; d.72    Cancer Maternal Cousin Female 51        thyroid ca    Breast Cancer Maternal Cousin Female 70    Prostate Cancer Paternal Uncle 75        seeds    Breast Cancer Self 61      Social History:   Social History     Socioeconomic History    Marital status:     Number of children: 2   Tobacco Use    Smoking status: Never    Smokeless tobacco: Never   Vaping Use    Vaping status: Never Used   Substance and Sexual Activity    Alcohol use: No    Drug use: No   Other Topics Concern    Caffeine Concern Yes     Comment: Coffee 2 cups daily; Soda    Exercise Yes     Comment: walking   Social History Narrative    The patient does not use an assistive device..      The patient does not live in a home with stairs.     Social Drivers of Health     Food Insecurity: No Food Insecurity (1/13/2025)    NCSS - Food Insecurity     Worried About Running Out of Food in the Last Year: No     Ran Out of Food in the Last Year: No   Transportation Needs: No Transportation Needs (1/13/2025)    NCSS - Transportation     Lack of Transportation: No   Housing Stability: Not At Risk (1/13/2025)    NCSS - Housing/Utilities     Has Housing: Yes     Worried About Losing Housing: No     Unable to Get Utilities: No        Medications (Active prior to today's visit):  Current Outpatient Medications   Medication Sig Dispense Refill    Tirzepatide-Weight Management (ZEPBOUND) 2.5 MG/0.5ML Subcutaneous Solution Auto-injector Inject 2.5 mg into the skin once a week for 4  doses. 2 mL 0    [START ON 2/10/2025] Tirzepatide-Weight Management (ZEPBOUND) 5 MG/0.5ML Subcutaneous Solution Auto-injector Inject 5 mg into the skin once a week for 4 doses. 2 mL 0    [START ON 3/10/2025] Tirzepatide-Weight Management (ZEPBOUND) 7.5 MG/0.5ML Subcutaneous Solution Auto-injector Inject 7.5 mg into the skin once a week for 4 doses. 2 mL 5    gabapentin 300 MG Oral Cap TAKE 2 CAPSULES EVERY MORNING AND TAKE 3 CAPSULES EVERY EVENING 450 capsule 3    furosemide 20 MG Oral Tab Take 1 tablet (20 mg total) by mouth every other day. 45 tablet 3    NORTRIPTYLINE 10 MG Oral Cap TAKE 2 CAPSULES EVERY NIGHT 180 capsule 3    FLUTICASONE PROPIONATE 50 MCG/ACT Nasal Suspension USE 2 SPRAYS NASALLY EVERY DAY 48 g 3    metoprolol succinate ER 50 MG Oral Tablet 24 Hr Take 1 tablet (50 mg total) by mouth daily. 90 tablet 3    Acetaminophen (TYLENOL EXTRA STRENGTH OR) Take by mouth. As needed      pantoprazole 40 MG Oral Tab EC Take 1 tablet (40 mg total) by mouth before breakfast. 90 tablet 3    letrozole 2.5 MG Oral Tab Take 1 tablet (2.5 mg total) by mouth daily. 90 tablet 3    magnesium oxide 400 MG Oral Tab Take 1 tablet (400 mg total) by mouth daily. 90 tablet 3    Hydroxychloroquine Sulfate (PLAQUENIL) 200 MG Oral Tab Take 1 tablet (200 mg total) by mouth daily.      [employer] VIT D 5000 HIGH-POTENCY 5000 UNIT OR CAPS 1 CAPSULE DAILY         Allergies:  Allergies[1]        General Health     In the past six months, have you lost more than 10 pounds without trying?: 2 - No    Has your appetite been poor?: No    Type of Diet: Other    How does the patient maintain a good energy level?: Daily Walks    How would you describe your daily physical activity?: Light    How would you describe your current health state?: Fair    How do you maintain positive mental well-being?: Visiting Family         Have you had any immunizations at another office such as Influenza, Hepatitis B, Tetanus, or Pneumococcal?: No     Functional  Ability     Bathing or Showering: Able without help    Toileting: Able without help    Dressing: Able without help    Eating: Able without help    Driving: Able without help    Preparing your meals: Able without help    Managing money/bills: Able without help    Taking medications as prescribed: Able without help    Are you able to afford your medications?: Yes    Hearing Problems?: No     Functional Status     Hearing Problems?: No    Vision Problems? : Yes    Difficulty walking?: No    Difficulty dressing or bathing?: No    Problems with daily activities? : No    Memory Problems?: No      Fall/Risk Assessment          Have you fallen in the last 12 months?: 0-No                              Fall/Risk Scorin         Depression Screening (PHQ-2/PHQ-9): Over the LAST 2 WEEKS   Little interest or pleasure in doing things (over the last two weeks)?: Not at all     Feeling down, depressed, or hopeless (over the last two weeks)?: Not at all     PHQ-2 SCORE: 0         Advance Directives     Do you have a healthcare power of ?: Yes    Do you have a living will?: No     Hearing Assessment (Required for AWV/SWV)    Whispered Voice    Visual Acuity     Right Eye Visual Acuity: Corrected Left Eye Visual Acuity: Corrected   Right Eye Chart Acuity: 20/25 Left Eye Chart Acuity: 20/30     Cognitive Assessment     What day of the week is this?: Correct    What month is it?: Correct    What year is it?: Correct    Recall \"Ball\": Correct    Recall \"Flag\": Correct    Recall \"Tree\": Correct        Gemini Mendoza's SCREENING SCHEDULE   Tests on this list are recommended by your physician but may not be covered, or covered at this frequency, by your insurer. Please check with your insurance carrier before scheduling to verify coverage.   PREVENTATIVE SERVICES  INDICATIONS AND SCHEDULE Internal Lab or Procedure External Lab or Procedure   Diabetes Screening      HbgA1C   Annually No results found for: \"A1C\"      No data to  display                Fasting Blood Sugar (FSB)Annually GLUCOSE (mg/dL)   Date Value   12/17/2024 180 (H)         No data to display               Cardiovascular Disease Screening     LDL Annually LDL-CHOLESTEROL (mg/dL (calc))   Date Value   12/17/2024 124 (H)        EKG One Time     Colorectal Cancer Screening      Colonoscopy Screen every 10 years Health Maintenance   Topic Date Due    Colorectal Cancer Screening  Never done    Update Health Maintenance if applicable    Flex Sigmoidoscopy Screen every 5 years No results found for this or any previous visit.      No data to display                 Fecal Occult Blood Annually No results found for: \"FOB\", \"OCCULTSTOOL\"      No data to display                Glaucoma Screening      Ophthalmology Visit Annually     Bone Density Screening      Dexascan Every two years Last Dexa Scan:    XR DEXA BONE DENSITOMETRY (CPT=77080) 12/06/2024        No data to display               Pap and Pelvic      Pap: Every 3 yrs age 21-65 or Pap+HPV every 5 yrs age 30-65, age 65 and older at high risk   No recommendations at this time Update Health Maintenance if applicable    Chlamydia  Annually if high risk No results found for: \"CHLAMYDIA\"      No data to display                Screening Mammogram      Mammogram  Annually Health Maintenance   Topic Date Due    Mammogram  Discontinued    Update Health Maintenance if applicable   Immunizations      Influenza No orders found for this or any previous visit. Update Immunization Activity if applicable    Pneumococcal No orders found for this or any previous visit. Update Immunization Activity if applicable    Hepatitis B No orders found for this or any previous visit. Update Immunization Activity if applicable    Tetanus No orders found for this or any previous visit. Update Immunization Activity if applicable    Zoster (Not covered by Medicare Part B) No orders found for this or any previous visit. Update Immunization Activity if  applicable     SPECIFIC DISEASE MONITORING Internal Lab or Procedure External Lab or Procedure   Annual Monitoring of Persistent     Medications (ACE/ARB, digoxin, diuretics)    Potassium  Annually POTASSIUM (mmol/L)   Date Value   12/17/2024 4.3         No data to display                Creatinine  Annually CREATININE (mg/dL)   Date Value   12/17/2024 0.86         No data to display                Digoxin Serum Conc  Annually No results found for: \"DIGOXIN\"      No data to display                Diabetes      HgbA1C  Annually No results found for: \"A1C\"      No data to display                Creat/alb ratio  Annually      LDL  Annually LDL-CHOLESTEROL (mg/dL (calc))   Date Value   12/17/2024 124 (H)         No data to display                 Dilated Eye exam  Annually      No data to display                   No data to display                COPD      Spirometry Testing Annually No results found for this or any previous visit.      No data to display                          ROS:   Constitutional: no weight loss; no fatigue  ENMT:  Negative for ear drainage, hearing loss and nasal drainage  Eyes:  Negative for eye discharge and vision loss  Cardiovascular:  Negative for chest pain; negative palpitations  Respiratory:  Negative for cough, dyspnea and wheezing  Endocrine:  Negative for abnormal sleep patterns, increased activity, polydipsia and polyphagia  Gastrointestinal:  Negative for abdominal pain, constipation, decreased appetite, diarrhea and vomiting; no melena or hematochezia  Musculoskeletal:  Negative for arthralgias or myalgias  Genitourinary:  Negative for dysuria or polyuria  Hema/Lymph:  Negative for easy bleeding and easy bruising  Integumentary:  Negative for pruritus and rash  Neurological:  Negative for gait disturbance; negative for paresthesias   All other review of systems are negative.        PHYSICAL EXAM:   Blood pressure 112/86, pulse 77, temperature 98 °F (36.7 °C), temperature source  Oral, height 5' 1\" (1.549 m), weight 191 lb (86.6 kg), SpO2 99%.  Constitutional: alert and oriented x3 in no acute distress  HEENT- EOMI, PERRL  Nose/Mouth/Throat: pharynx without erythema; no oral lesions  Neck/Thyroid: neck supple; no thyromegaly  Lymphatics: no lymphadenopathy of neck or groin  Cardiovascular: RRR, S1, S2, no S3 or murmur  Respiratory: lungs without crackles or wheezes  Abdomen: normoactive bowel sounds, soft, non-tender and non-distended  Extremities: no clubbing, cyanosis or edema  Vascular: no carotid bruits; DP/PT 2/2  Musculoskeletal: Motor 5/5 upper and lower extremities  Neurological: cranial nerves II-XII intact; light touch and proprioception intact  Skin: no rash or ulcerations         ASSESSMENT/PLAN:   Physical exam  S/p unilateral oophorectomy/ hysterectomy in 1992, so PAP not indicated;  Prevnar 20 given Dec 2022    HTN  -SBP 120s on metoprolol ER 50 mg po qD  -(had no response to losartan)  -due to leg edema, stopped amlodipine    Edema  To both legs and both arms; may be side effect of amlodipine, venous insufficiency (edema worsens as day progresses), or lymphedema;  -on Lasix 20 mg po every day prn; average use is every other day     Kidney cyst  Kidney U/S with indeterminate 1.4 cm nodule to inferior pole of left kidney; MRI abdomen in Feb 2020 showed a 1.1 cm left renal cortical lesion in the inferior pole demonstrates mild enhancement consistent with a small neoplasm; pt seen by urologist Dr Cartwright and advised for surveillance imaging; repeat CT abdomen in Sept 2020 showed stable solid 1.4 cm left lower pole renal mass with 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 This exhibits persistent mild intralesional enhancement, concerning for neoplasm, which could be  benign (series oncocytoma) or malignant. There has been no significant interval  change since 02/11/2020; MRI abdomen in Feb 2022 showed lesion along the inferior pole of the left kidney is decreased in size, measuring 0.7 x 0.7 cm; MRI abdomen in Feb 2023 and Feb 2024 showed stable findings; saw Dr Cartwright in March 2024; no add'l imaging is recommended; pt was discharged from urologic care     History of UTI  Had dysuria on 4/3/21; seen in urgent care; was initially given Macrobid; UCx grew >100K Proteus mirabilis; then was switched to Cipro 500 mg po BID #14; she finished Abx on 4/12; had noticed recurrent +dysuria, +bladder spasms, +hematuria  -s/p amoxicillin 500 mg po TID #30     Breast cancer, right  Stage IIA [id number] IDC RUIQ s/p biopsy, ER 95/FL 90 Her2 neg, low oncotype Favorable Ki-67 8%, s/p right mastectomy and ALND with 1/18 LN positive, s/p adjuvant XRT on 2/11/2019 with Dr Griffin; currently on adjuvant hormonal blockade with letrozole 2.5 mg po qD under care of Dr Beckham; she does not plan to pursue breast reconstruction; left breast mammo in Nov 2023 was BiRads 2; DEXA was ordered by DR Clifton CASTREJON lymphedema  Has been followed by physical therapist, Roxi Torres; uses compression sleeve  Uses Lymphapress every other day to decreased RUE swelling  -pt uses Lymphapress sleeves to treat RUE lymphedema     SLE (systemic lupus erythematosus) (HCC)  on Plaquenil 200 mg po qD as directed by Dr Valadez; Rx per Dr Valadez--> F/U q 6 months; sees chiropractor; also goes to Dr Arteaga Daily at [facility] q 6 mos for Plaquenil eye monintoring     Chronic headache  on nortriptyline 10 mg two tabs (=20mg) po qHS     Peripheral neuropathy  on gabapentin 600 mg po qAM and 900 mg po qHS; Rx per neurologist Dr Parra, though pt no longer sees  -consider addition of Cymbalta if sxs refractory     GERD  on pantoprazole 40 mg op qD as heartburn has recurred; had EGD in Dec 2014 by Dr Encinas; also seen to have  hiatal hernia     History of Depression  off fluoxetine 20 mg since Jan 2017; on no meds    Osteoporosis   DEXA in Sept 2018 shows T-score -1.5; Had DEXA scan in April 2017; was taking OTC calcium supplement; does not take bisphosphonates orally due to dyspepsia and hiatal hernia; undergoes Reclast IV annually in March at Dr Valadez's office; had one dose in Jan 2018, March 2019; did not go in March 2020; planned 5 years' duration; DEXA in Sept 2020 shows T-score -1.4 in left hip; DEXA in Oct 2022 shows T-score -1.8 in left femoral neck; CT abdomen in Sept 2020 shows mild chronic L3 superior endplate compression fracture.  -DEXA in Dec 2024 showed T-score -2.4 in left femoral neck; seeing endocrinologist Dr Cisneros in Feb 2025  -Reclast has been on hold since 2020 (had doses Jan 2018, March 2019)     Internal hemorrhoids  S/p banding by Dr Hatch in 2010-11; has occasional stool incontinence; s/p banding with Dr Casey in 2018    obesity  Body mass index is 36.09 kg/m².; weight 190 pounds; goal weight 145 pounds   advise exercise and weight loss  -start Zepbound 2.5 mg weekly x 4,  Then 5 mg weekly x4  Them 7.5 mg weekly thereafter  -RTC 3 mos     Lumbar spondylosis  MRI lumbar spine in Jan 2020 shows  L4-L5:  Mild left greater than lateral recess and mild left greater than right neural foraminal stenosis.  Mild progression since July, 2015.    L5-S1:  Moderate spinal canal stenosis, predominantly related to dorsal and ventral epidural lipomatosis.  No significant interval change since July, 2015.    L1-L2:  Degenerative disc disease, which is new or progressive since July, 2015.  No significant neural compromise.  -s/p LESI in Feb 2020; pt followed by Dr Ambrosio  -MRI L-spine n Nov 2023 showed Degenerative disc and facet disease throughout the lumbar spine, most prominent at L4-L5, where there is moderate narrowing of the canal and bilateral neural foramen.  This has minimally progressed since 1/11/2020.   -F/U   Daily     Trigger finger   Of right 3rd finger; s/p injection in Sept 2020 by Dr Ambrosio     hypercholesterolemia   in Dec 2024     Vitamin D deficiency  VItamin D level 61 in Dec 2024; on Vitamin D 5000 units daily     Atypical nevus  To left trunk; pt referred to dermatologist Dr Bowens    Colon cancer screening  Had colonoscopy in Dec 2014 with GI Dr Encinas (next colonoscopy due in 10 years, or Dec 2024);  -pt referred to GI Dr Cordoba for colonoscopy            MWE 1/13/25     Spent 30 minutes obtaining history, evaluating patient, discussing treatment options, diet, exercise, review of available labs and radiology reports, and completing documentation.                Orders This Visit:  No orders of the defined types were placed in this encounter.      Meds This Visit:  Requested Prescriptions     Signed Prescriptions Disp Refills    Tirzepatide-Weight Management (ZEPBOUND) 2.5 MG/0.5ML Subcutaneous Solution Auto-injector 2 mL 0     Sig: Inject 2.5 mg into the skin once a week for 4 doses.    Tirzepatide-Weight Management (ZEPBOUND) 5 MG/0.5ML Subcutaneous Solution Auto-injector 2 mL 0     Sig: Inject 5 mg into the skin once a week for 4 doses.    Tirzepatide-Weight Management (ZEPBOUND) 7.5 MG/0.5ML Subcutaneous Solution Auto-injector 2 mL 5     Sig: Inject 7.5 mg into the skin once a week for 4 doses.    gabapentin 300 MG Oral Cap 450 capsule 3     Sig: TAKE 2 CAPSULES EVERY MORNING AND TAKE 3 CAPSULES EVERY EVENING       Imaging & Referrals:  GASTRO - INTERNAL     1/13/2025  Garrick Croft MD               [1]   Allergies  Allergen Reactions    Lymphazurin [Isosulfan Blue] ANAPHYLAXIS    Elavil [Amitriptyline Hcl] OTHER (SEE COMMENTS)     Altered mental state    Flexeril [Cyclobenzaprine Hcl] OTHER (SEE COMMENTS)     Altered mental state    Nabumetone      Other reaction(s): Anaphylaxis    Relafen ANAPHYLAXIS    Cephalexin NAUSEA ONLY

## 2025-01-16 NOTE — TELEPHONE ENCOUNTER
Patient called checking on the status of the Zepbound alternative.  Patient is also checking with [facility] to see when they are sending over a Prior Authorization.    Please call patient and advise

## 2025-01-17 NOTE — TELEPHONE ENCOUNTER
Spoke to pt - we reviewed her insurance does not cover any wt loss meds;   alternatives are to look into wt watchers and GLPs;  increase exercise, currently pt does not exercise;  goal of 150 min/week;  food journal to identify calorie intake and make subtractions.

## 2025-02-05 ENCOUNTER — OFFICE VISIT (OUTPATIENT)
Dept: ENDOCRINOLOGY CLINIC | Facility: CLINIC | Age: 70
End: 2025-02-05
Payer: MEDICARE

## 2025-02-05 VITALS
HEART RATE: 75 BPM | DIASTOLIC BLOOD PRESSURE: 85 MMHG | SYSTOLIC BLOOD PRESSURE: 137 MMHG | WEIGHT: 190 LBS | BODY MASS INDEX: 36 KG/M2

## 2025-02-05 DIAGNOSIS — M81.8 OTHER OSTEOPOROSIS WITHOUT CURRENT PATHOLOGICAL FRACTURE: Primary | ICD-10-CM

## 2025-02-05 DIAGNOSIS — R73.01 IMPAIRED FASTING GLUCOSE: ICD-10-CM

## 2025-02-05 PROCEDURE — 3079F DIAST BP 80-89 MM HG: CPT | Performed by: INTERNAL MEDICINE

## 2025-02-05 PROCEDURE — 1159F MED LIST DOCD IN RCRD: CPT | Performed by: INTERNAL MEDICINE

## 2025-02-05 PROCEDURE — 99204 OFFICE O/P NEW MOD 45 MIN: CPT | Performed by: INTERNAL MEDICINE

## 2025-02-05 PROCEDURE — 3075F SYST BP GE 130 - 139MM HG: CPT | Performed by: INTERNAL MEDICINE

## 2025-02-05 PROCEDURE — 1160F RVW MEDS BY RX/DR IN RCRD: CPT | Performed by: INTERNAL MEDICINE

## 2025-02-05 RX ORDER — METFORMIN HYDROCHLORIDE 500 MG/1
500 TABLET, EXTENDED RELEASE ORAL 2 TIMES DAILY WITH MEALS
Qty: 180 TABLET | Refills: 1 | Status: SHIPPED | OUTPATIENT
Start: 2025-02-05

## 2025-02-05 RX ORDER — ZOLEDRONIC ACID 0.05 MG/ML
5 INJECTION, SOLUTION INTRAVENOUS ONCE
OUTPATIENT
Start: 2025-02-05

## 2025-02-05 NOTE — PROGRESS NOTES
Name: Gemini Mendoza  Date: 2/5/2025    Referring Physician: No ref. provider found    Chief Complaint   Patient presents with    Consult     Pt in to discuss osteo        HISTORY OF PRESENT ILLNESS   Gemini Mendoza is a 69 year old female who presents for   Chief Complaint   Patient presents with    Consult     Pt in to discuss osteo      70 y/o F presents for initial evaluation of osteopenia.  She does have history of breast cancer diagnosed in 2018 and now maintained on Letrozole therapy.  She has at least another 3 years of therapy based on recent evaluation. No h/o fractures.  She does have recent falls.     She received Reclast 2019 but then therapy put on hold given public health emergency.      She is maintained on Vitamin D 5000 units daily.  She does have occ yogurt or cheese.  No MVI or calcium supplement.      Of note she does see rheumatology for system lupus but no prednisone therapy in the past 20 years.       HgA1c 6.2%     REVIEW OF SYSTEMS  Eyes: no change in vision  Neurologic: no headache, generalized or focal weakness or numbness.  Head: normal  ENT: normal  Lungs: no shortness of breath, wheezing or LAWTON  Cardiovascular:  no chest pain or palpitations  Gastrointestinal:  no abdominal pain, bowel movement problems  Musculoskeletal: no muscle pain or arthralgia  /Gyne: no frequency or discomfort while urinating  Psychiatric:  no acute distress, anxiety  or depression  Skin: normal moisturized skin    Medications:     Current Outpatient Medications:     gabapentin 300 MG Oral Cap, TAKE 2 CAPSULES EVERY MORNING AND TAKE 3 CAPSULES EVERY EVENING, Disp: 450 capsule, Rfl: 3    furosemide 20 MG Oral Tab, Take 1 tablet (20 mg total) by mouth every other day., Disp: 45 tablet, Rfl: 3    NORTRIPTYLINE 10 MG Oral Cap, TAKE 2 CAPSULES EVERY NIGHT, Disp: 180 capsule, Rfl: 3    FLUTICASONE PROPIONATE 50 MCG/ACT Nasal Suspension, USE 2 SPRAYS NASALLY EVERY DAY, Disp: 48 g, Rfl: 3    metoprolol succinate  ER 50 MG Oral Tablet 24 Hr, Take 1 tablet (50 mg total) by mouth daily., Disp: 90 tablet, Rfl: 3    Acetaminophen (TYLENOL EXTRA STRENGTH OR), Take by mouth. As needed, Disp: , Rfl:     pantoprazole 40 MG Oral Tab EC, Take 1 tablet (40 mg total) by mouth before breakfast., Disp: 90 tablet, Rfl: 3    letrozole 2.5 MG Oral Tab, Take 1 tablet (2.5 mg total) by mouth daily., Disp: 90 tablet, Rfl: 3    magnesium oxide 400 MG Oral Tab, Take 1 tablet (400 mg total) by mouth daily., Disp: 90 tablet, Rfl: 3    Hydroxychloroquine Sulfate (PLAQUENIL) 200 MG Oral Tab, Take 1 tablet (200 mg total) by mouth daily., Disp: , Rfl:     [employer] VIT D 5000 HIGH-POTENCY 5000 UNIT OR CAPS, 1 CAPSULE DAILY, Disp: , Rfl:     [START ON 2/10/2025] Tirzepatide-Weight Management (ZEPBOUND) 5 MG/0.5ML Subcutaneous Solution Auto-injector, Inject 5 mg into the skin once a week for 4 doses., Disp: 2 mL, Rfl: 0    [START ON 3/10/2025] Tirzepatide-Weight Management (ZEPBOUND) 7.5 MG/0.5ML Subcutaneous Solution Auto-injector, Inject 7.5 mg into the skin once a week for 4 doses., Disp: 2 mL, Rfl: 5     Allergies:   Allergies[1]    Social History:   Social History     Socioeconomic History    Marital status:     Number of children: 2   Tobacco Use    Smoking status: Never    Smokeless tobacco: Never   Vaping Use    Vaping status: Never Used   Substance and Sexual Activity    Alcohol use: No    Drug use: No   Other Topics Concern    Caffeine Concern Yes     Comment: Coffee 2 cups daily; Soda    Exercise Yes     Comment: walking       Medical History:   Past Medical History:    Asthma (HCC)    Breast cancer (HCC)    right breast    Esophageal reflux    Extrinsic asthma, unspecified    Fibromyalgia    Hiatal hernia with GERD    High blood pressure    Insomnia    Osteopenia    Peripheral neuropathy    Raynaud's phenomenon    SLE (systemic lupus erythematosus) ([facility])    Dr Valadez    Stress fracture of the metatarsals    4th MT       Surgical history:    Past Surgical History:   Procedure Laterality Date    Ankle fracture surgery Left     Cholecystectomy  1998    laparoscopic     Colonoscopy  12/12/2014    Electrocardiogram, complete      Scanned to media tab - DOS 04-    Hemorrhoidectomy      Hysterectomy  1992    laparoscopic (endometriosis)    Kaveh biopsy stereo nodule 1 site right (cpt=19081)  09/26/2018    Kaveh localization wire 1 site right (cpt=19281)  1992    Mastectomy right  11/01/2018    Other surgical history  11/01/2018    right breast mastecetomy    Radiation right      12/31/18-2/2019       PHYSICAL EXAMINATION:  /83   Pulse 75   Wt 190 lb (86.2 kg)   BMI 35.90 kg/m²     General Appearance:  Alert, in no acute distress, well developed  Eyes: normal conjunctivae, sclera.  Ears/Nose/Mouth/Throat/Neck:  normal hearing, normal speech   Musculoskeletal:  normal muscle strength and tone  PV: normal pulses of carotids, pedals  Skin:  normal moisture and skin texture  Hair & Nails:  normal scalp hair     Neuro:  sensory grossly intact and motor grossly intact  Psychiatric:  oriented to time, self, and place  Nutritional:  no abnormal weight gain or loss    ASSESSMENT/PLAN:    Osteoporosis  - Discussed diagnosis with patient  - Discussed importance of treatment given significant bone loss  - Start Reclast therapy, verbalized understanding of risks and benefits  - Normal Vitamin D level  - Continue VItamin D supplement  - Discussed adding calcium    2. Impaired Fasting Glucose  - GLP-1 therapy is not covered  - Start Metformin ER 500mg PO BID, verbalized understanding of risks and benefits  - Recheck HgA1c in 3 months    RTC 1 year     2/5/2025  Kim Cisneros MD         [1]   Allergies  Allergen Reactions    Lymphazurin [Isosulfan Blue] ANAPHYLAXIS    Elavil [Amitriptyline Hcl] OTHER (SEE COMMENTS)     Altered mental state    Flexeril [Cyclobenzaprine Hcl] OTHER (SEE COMMENTS)     Altered mental state    Nabumetone      Other reaction(s):  Anaphylaxis    Relafen ANAPHYLAXIS    Cephalexin NAUSEA ONLY

## 2025-02-06 ENCOUNTER — TELEPHONE (OUTPATIENT)
Dept: ENDOCRINOLOGY CLINIC | Facility: CLINIC | Age: 70
End: 2025-02-06

## 2025-02-06 DIAGNOSIS — M81.8 OTHER OSTEOPOROSIS WITHOUT CURRENT PATHOLOGICAL FRACTURE: Primary | ICD-10-CM

## 2025-02-06 RX ORDER — ZOLEDRONIC ACID 0.05 MG/ML
5 INJECTION, SOLUTION INTRAVENOUS ONCE
OUTPATIENT
Start: 2025-02-06

## 2025-02-06 NOTE — TELEPHONE ENCOUNTER
Good morning,     Verified coverage, no authorization needed for Reclast infusion.     Okay to schedule patient.       Thank You,  Ngoc

## 2025-02-12 ENCOUNTER — OFFICE VISIT (OUTPATIENT)
Facility: CLINIC | Age: 70
End: 2025-02-12
Payer: MEDICARE

## 2025-02-12 ENCOUNTER — TELEPHONE (OUTPATIENT)
Facility: CLINIC | Age: 70
End: 2025-02-12

## 2025-02-12 VITALS
BODY MASS INDEX: 36.06 KG/M2 | HEIGHT: 61 IN | HEART RATE: 76 BPM | WEIGHT: 191 LBS | DIASTOLIC BLOOD PRESSURE: 81 MMHG | SYSTOLIC BLOOD PRESSURE: 126 MMHG

## 2025-02-12 DIAGNOSIS — Z12.11 SCREENING FOR COLON CANCER: Primary | ICD-10-CM

## 2025-02-12 DIAGNOSIS — K21.9 GASTROESOPHAGEAL REFLUX DISEASE, UNSPECIFIED WHETHER ESOPHAGITIS PRESENT: ICD-10-CM

## 2025-02-12 DIAGNOSIS — R10.13 EPIGASTRIC PAIN: ICD-10-CM

## 2025-02-12 PROCEDURE — 3079F DIAST BP 80-89 MM HG: CPT | Performed by: NURSE PRACTITIONER

## 2025-02-12 PROCEDURE — 1159F MED LIST DOCD IN RCRD: CPT | Performed by: NURSE PRACTITIONER

## 2025-02-12 PROCEDURE — 99204 OFFICE O/P NEW MOD 45 MIN: CPT | Performed by: NURSE PRACTITIONER

## 2025-02-12 PROCEDURE — 3008F BODY MASS INDEX DOCD: CPT | Performed by: NURSE PRACTITIONER

## 2025-02-12 PROCEDURE — 3074F SYST BP LT 130 MM HG: CPT | Performed by: NURSE PRACTITIONER

## 2025-02-12 PROCEDURE — 1160F RVW MEDS BY RX/DR IN RCRD: CPT | Performed by: NURSE PRACTITIONER

## 2025-02-12 RX ORDER — PANTOPRAZOLE SODIUM 40 MG/1
40 TABLET, DELAYED RELEASE ORAL
Qty: 180 TABLET | Refills: 0 | Status: SHIPPED | OUTPATIENT
Start: 2025-02-12 | End: 2025-05-13

## 2025-02-12 NOTE — H&P
[facility] - Gastroenterology                                                                                                  Clinic History and Physical     Chief Complaint   Patient presents with    Colonoscopy Screening       Requesting physician or provider: Garrick Croft MD    HPI:   Gemini Mendoza is a 69 year old year-old female with history of asthma/,copd, lupus, breast cancer, thoracic outlet syndrome, GERD, hemorrhoids, vertigo.The patient presents for colon cancer screening evaluation.    Has taken Pantoprazole daily for mean years. Has some epigastric pain at night. Needs to sleep sitting up at times. Eats at least 2 hours prior to bed. No known triggers. Known hiatal hernia.   Had daily bowel movement in the AM, no changes.     Patient is here today as a referral from her PCP for evaluation prior to undergoing colonoscopy for CRC screening. Patient denies any GI symptoms of nausea, vomiting, dysphagia, hematemesis, abdominal pain, change in bowel habits, constipation, diarrhea, hematochezia, or melena.  Additionally there is no unintentional weight loss.      Last colonoscopy/EGD:  2014 Dr. Encinas   Preop diagnosis: Heartburn, colon cancer screening  Postop diagnosis: Small hiatal hernia, diverticulosis, internal hemorrhoids  Procedures: EGD, colonoscopy  Sedation: versed 10mg IVP, Fentanyl 25 mcg IVP     Plan: Prilosec OTC qd          Colonoscopy in 10 years.    NSAIDS: none  Tobacco: none  Alcohol: none  Marijuana: none  Illicit drugs: none    FH GI malignancy:  none    No history of adverse reaction to sedation  No TIFFANY  No anticoagulants/antiplatelet  No pacemaker/defibrillator  No pain medications and/or sleep aides    Wt Readings from Last 6 Encounters:   02/12/25 191 lb (86.6 kg)   02/05/25 190 lb (86.2 kg)   01/13/25 191 lb (86.6 kg)   12/19/24 194 lb 1.6 oz (88 kg)   07/16/24 182 lb (82.6 kg)   07/10/24 182 lb 9.6 oz (82.8 kg)          History, Medications, Allergies, ROS:      Past  Medical History:    Asthma (HCC)    Breast cancer (HCC)    right breast    Esophageal reflux    Extrinsic asthma, unspecified    Fibromyalgia    Hiatal hernia with GERD    High blood pressure    Insomnia    Osteopenia    Peripheral neuropathy    Raynaud's phenomenon    SLE (systemic lupus erythematosus) (HCC)    Dr Valadez    Stress fracture of the metatarsals    4th MT      Past Surgical History:   Procedure Laterality Date    Ankle fracture surgery Left     Cholecystectomy  1998    laparoscopic     Colonoscopy  12/12/2014    Electrocardiogram, complete      Scanned to media tab - DOS 04-    Hemorrhoidectomy      Hysterectomy  1992    laparoscopic (endometriosis)    Kaveh biopsy stereo nodule 1 site right (cpt=19081)  09/26/2018    Kaveh localization wire 1 site right (cpt=19281)  1992    Mastectomy right  11/01/2018    Other surgical history  11/01/2018    right breast mastecetomy    Radiation right      12/31/18-2/2019      Family Hx:   Family History   Problem Relation Age of Onset    Prostate Cancer Father 57        d.62 metastatic    Heart Attack Father         MI    Cancer Mother 27        thyroid; lung @ 63 (smoker)    Heart Disease Mother     Cancer Maternal Grandmother 45        cervical/uterine    Cancer Maternal Aunt 75        pancreatic ca; fmr smoker; d.75    Pancreatic Cancer Maternal Aunt 75    Cancer Sister 61        thyroid ca; sx only    Prostate Cancer Brother 49        surgery    Cancer Maternal Uncle 40        bladder ca, non-smoker    Cancer Maternal Grandfather 60        lymphoma    Cancer Paternal Grandfather         brain ca; dx on autopsy; d.55    Other (drug addict) Son     Cancer Maternal Aunt 62        lung ca; smoker; d.62    Cancer Maternal Aunt 72        lung ca; smoker; d.72    Cancer Maternal Cousin Female 51        thyroid ca    Breast Cancer Maternal Cousin Female 70    Prostate Cancer Paternal Uncle 75        seeds    Breast Cancer Self 61      Social History:   Social  History     Socioeconomic History    Marital status:     Number of children: 2   Tobacco Use    Smoking status: Never    Smokeless tobacco: Never   Vaping Use    Vaping status: Never Used   Substance and Sexual Activity    Alcohol use: No    Drug use: No   Other Topics Concern    Caffeine Concern Yes     Comment: Coffee 2 cups daily; Soda    Exercise Yes     Comment: walking   Social History Narrative    The patient does not use an assistive device..      The patient does not live in a home with stairs.     Social Drivers of Health     Food Insecurity: No Food Insecurity (1/13/2025)    NCSS - Food Insecurity     Worried About Running Out of Food in the Last Year: No     Ran Out of Food in the Last Year: No   Transportation Needs: No Transportation Needs (1/13/2025)    NCSS - Transportation     Lack of Transportation: No   Housing Stability: Not At Risk (1/13/2025)    NCSS - Housing/Utilities     Has Housing: Yes     Worried About Losing Housing: No     Unable to Get Utilities: No        Medications (Active prior to today's visit):  Current Outpatient Medications   Medication Sig Dispense Refill    gabapentin 300 MG Oral Cap TAKE 2 CAPSULES EVERY MORNING AND TAKE 3 CAPSULES EVERY EVENING 450 capsule 3    furosemide 20 MG Oral Tab Take 1 tablet (20 mg total) by mouth every other day. 45 tablet 3    NORTRIPTYLINE 10 MG Oral Cap TAKE 2 CAPSULES EVERY NIGHT 180 capsule 3    FLUTICASONE PROPIONATE 50 MCG/ACT Nasal Suspension USE 2 SPRAYS NASALLY EVERY DAY 48 g 3    metoprolol succinate ER 50 MG Oral Tablet 24 Hr Take 1 tablet (50 mg total) by mouth daily. 90 tablet 3    Acetaminophen (TYLENOL EXTRA STRENGTH OR) Take by mouth. As needed      pantoprazole 40 MG Oral Tab EC Take 1 tablet (40 mg total) by mouth before breakfast. 90 tablet 3    letrozole 2.5 MG Oral Tab Take 1 tablet (2.5 mg total) by mouth daily. 90 tablet 3    magnesium oxide 400 MG Oral Tab Take 1 tablet (400 mg total) by mouth daily. 90 tablet 3     Hydroxychloroquine Sulfate (PLAQUENIL) 200 MG Oral Tab Take 1 tablet (200 mg total) by mouth daily.      [employer]S VIT D 5000 HIGH-POTENCY 5000 UNIT OR CAPS 1 CAPSULE DAILY      metFORMIN  MG Oral Tablet 24 Hr Take 1 tablet (500 mg total) by mouth 2 (two) times daily with meals. (Patient not taking: Reported on 2/12/2025) 180 tablet 1       Allergies:  Allergies[1]    ROS:   CONSTITUTIONAL: negative for fevers, chills, sweats  EYES Negative for scleral icterus or redness, and diplopia  HEENT: Negative for hoarseness  RESPIRATORY: Negative for cough and severe shortness of breath  CARDIOVASCULAR: Negative for crushing sub-sternal chest pain  GASTROINTESTINAL: See HPI  GENITOURINARY: Negative for dysuria  MUSCULOSKELETAL: Negative for arthralgias and myalgias  SKIN: Negative for jaundice, rash or pruritus  NEUROLOGICAL: Negative for dizziness and headaches  BEHAVIOR/PSYCH: Negative for psychotic behavior      PHYSICAL EXAM:   Blood pressure 157/89, pulse 76, height 5' 1\" (1.549 m), weight 191 lb (86.6 kg).    GEN: Alert, no acute distress, well-nourished   HEENT: anicteric sclera, neck supple, trachea midline, MMM, no palpable or tender neck or supraclavicular lymph nodes  CV: RRR, the extremities are warm and well perfused   LUNGS: No increased work of breathing, CTAB  ABDOMEN: Soft, symmetrical, non-tender without distention or guarding. No scars or lesions. Aorta is without bruit or visible pulsation. Umbilicus is midline without herniation. Normoactive bowel sounds are present, No masses, hepatomegaly or splenomegaly noted.  MSK: No erythema, no warmth, no swelling of joints  SKIN: No jaundice, no erythema, no rashes, no lesions  HEMATOLOGIC: No bleeding, no bruising  NEURO: Alert and interactive, LIM  PSYCH: appropriate mood & affect    Labs/Imaging:     Patient's labs and imaging were reviewed and discussed with patient today.    .  ASSESSMENT/PLAN:   Gemini Mendoza is a 69 year old year-old female with  history of asthma/,copd, lupus, breast cancer, thoracic outlet syndrome, GERD, hemorrhoids, vertigo.The patient presents for colon cancer screening evaluation.    #colorectal cancer screening: patient is considered average risk for colon cancer (No immediate family hx of colon cancer) and it is appropriate to proceed with screening colonoscopy. Patient is currently asymptomatic and denies diarrhea, hematochezia, thin-stools or weight loss. We discussed risks/benefits/alternatives to procedure, including stool testing, they want to proceed with colonoscopy.  No anemia noted on blood work.    #GERD  #epigastric pain  Recommended switching to twice daily Pantoprazole if symptoms persist. Can try Gaviscon when acutely feeling the symptoms. Continue to avoid NSAIDs and dietary triggers. Known history of hiatal hernia, discussed effects of weight gain. EGD ordered.       -Anti-platelets and anti-coagulants: none  -Diabetes meds: possibly starting Metformin     Patient Instructions   1. Schedule colonoscopy + EGD with [facility]  Diagnosis: colon cancer screening, GERD, epigastric pain  Sedation: MAC  Prep: split dose golytely    2. MEDICATION CHANGES PRIOR TO PROCEDURE    *HOLD metformin day before & day of procedure    3.  bowel prep from pharmacy   You can pick the bowel prep up now and store in a cool, dry place in your home until your scheduled bowel prep start date.    4. Read all bowel prep instructions carefully. Bowel prep instructions can also be found online at:  [email or web address]     5. AVOID seeds, nuts, popcorn, raw fruits and vegetables for 5 days before procedure    6. If you start any NEW medication after your visit today, please notify us. Certain medications (like iron or weight loss medications) will need to be held before the procedure, or the procedure cannot be performed safely.       Endoscopy risk/benefit discussion: I have thoroughly discussed the risks, benefits, and  alternatives of endoscopic evaluation with the patient, who demonstrated understanding. This includes the potential risks of bleeding, infection, pain, anesthesia complications, and perforation, which may result in prolonged hospitalization or surgical intervention. All of the patient’s questions were addressed to their satisfaction. The patient has chosen to proceed with the endoscopic procedure, including any necessary interventions such as polypectomy, biopsy, control of bleeding.      Orders This Visit:  No orders of the defined types were placed in this encounter.      Meds This Visit:  Requested Prescriptions      No prescriptions requested or ordered in this encounter       Imaging & Referrals:  None       SEKOU Stevens    [facility] Gastroenterology  2/12/2025               [1]   Allergies  Allergen Reactions    Lymphazurin [Isosulfan Blue] ANAPHYLAXIS    Elavil [Amitriptyline Hcl] OTHER (SEE COMMENTS)     Altered mental state    Flexeril [Cyclobenzaprine Hcl] OTHER (SEE COMMENTS)     Altered mental state    Nabumetone      Other reaction(s): Anaphylaxis    Relafen ANAPHYLAXIS    Cephalexin NAUSEA ONLY

## 2025-02-12 NOTE — TELEPHONE ENCOUNTER
Patient was seen in office today (2/12/2025) by SEKOU Rey. Provided patient with office number and prep instructions. Reviewed prep instructions with patient in office, verbalized understanding. Patient aware GI schedulers will call patient to schedule the procedure.      Procedure orders:    Schedule: Colonoscopy and Esophagogastroduodenoscopy (EGD) with [facility] [place] [facility]oscopist    Diagnosis:     ICD-10-CM   1. Screening for colon cancer  Z12.11   2. Gastroesophageal reflux disease, unspecified whether esophagitis present  K21.9   3. Epigastric pain  R10.13      Sedation: MAC   Prep: Split GoLytely    Medication changes prior to procedure:   If taking, Hold Metformin day before and day of procedure.

## 2025-02-12 NOTE — TELEPHONE ENCOUNTER
Scheduled for:  Colonoscopy 15393 EGD [id number]  Provider Name:  Dr. Navarro  Date:  5/16/2025  Location:  [place]  Sedation:  MAC  Time:  9AM, pt is awar [facility] will call with arrival time  Prep:    Meds/Allergies Reconciled?:      Diagnosis with codes:    1. Screening for colon cancer  Z12.11   2. Gastroesophageal reflux disease, unspecified whether esophagitis present  K21.9   3. Epigastric pain  R10.13     Was patient informed to call insurance with codes (Y/N):  Yes     Referral sent?:  Referral was sent at the time of electronic surgical scheduling.   EM or [facility] notified?:  I sent an electronic request to Endo Scheduling and received a confirmation today.      Medication Orders:  If taking, Hold Metformin day before and day of procedure.   Misc Orders:  n/a     Further instructions given by staff:  Patient states received instructions in office

## 2025-02-12 NOTE — PATIENT INSTRUCTIONS
1. Schedule colonoscopy + EGD with [facility] [place] Endoscopist  Diagnosis: colon cancer screening, GERD, epigastric pain  Sedation: MAC  Prep: split dose golytely    2. MEDICATION CHANGES PRIOR TO PROCEDURE    *HOLD metformin day before & day of procedure    3.  bowel prep from pharmacy   You can pick the bowel prep up now and store in a cool, dry place in your home until your scheduled bowel prep start date.    4. Read all bowel prep instructions carefully. Bowel prep instructions can also be found online at:  [email or web address]     5. AVOID seeds, nuts, popcorn, raw fruits and vegetables for 5 days before procedure    6. If you start any NEW medication after your visit today, please notify us. Certain medications (like iron or weight loss medications) will need to be held before the procedure, or the procedure cannot be performed safely.

## 2025-02-13 ENCOUNTER — PATIENT MESSAGE (OUTPATIENT)
Dept: ENDOCRINOLOGY CLINIC | Facility: CLINIC | Age: 70
End: 2025-02-13

## 2025-02-13 DIAGNOSIS — R73.01 IMPAIRED FASTING GLUCOSE: Primary | ICD-10-CM

## 2025-02-17 ENCOUNTER — OFFICE VISIT (OUTPATIENT)
Dept: PHYSICAL MEDICINE AND REHAB | Facility: CLINIC | Age: 70
End: 2025-02-17
Payer: MEDICARE

## 2025-02-17 VITALS — HEART RATE: 77 BPM | BODY MASS INDEX: 36 KG/M2 | WEIGHT: 190 LBS | OXYGEN SATURATION: 99 %

## 2025-02-17 DIAGNOSIS — I89.0 LYMPHEDEMA OF RIGHT ARM: ICD-10-CM

## 2025-02-17 DIAGNOSIS — M51.9 LUMBAR DISC DISEASE: ICD-10-CM

## 2025-02-17 DIAGNOSIS — J44.89 ASTHMA WITH COPD (CHRONIC OBSTRUCTIVE PULMONARY DISEASE) (HCC): Chronic | ICD-10-CM

## 2025-02-17 DIAGNOSIS — M51.369 ANNULAR TEAR OF LUMBAR DISC: ICD-10-CM

## 2025-02-17 DIAGNOSIS — E66.01 SEVERE OBESITY (BMI 35.0-39.9) WITH COMORBIDITY (HCC): Chronic | ICD-10-CM

## 2025-02-17 DIAGNOSIS — C50.911 PRIMARY CANCER OF RIGHT BREAST WITH STAGE 2 NODAL METASTASIS PER AMERICAN JOINT COMMITTEE ON CANCER 7TH EDITION (N2) (HCC): Primary | ICD-10-CM

## 2025-02-17 DIAGNOSIS — M65.332 ACQUIRED TRIGGER FINGER OF LEFT MIDDLE FINGER: ICD-10-CM

## 2025-02-17 DIAGNOSIS — C77.9 PRIMARY CANCER OF RIGHT BREAST WITH STAGE 2 NODAL METASTASIS PER AMERICAN JOINT COMMITTEE ON CANCER 7TH EDITION (N2) (HCC): Primary | ICD-10-CM

## 2025-02-17 DIAGNOSIS — M48.061 LUMBAR FORAMINAL STENOSIS: ICD-10-CM

## 2025-02-17 DIAGNOSIS — M54.16 LUMBAR RADICULOPATHY: ICD-10-CM

## 2025-02-17 DIAGNOSIS — M43.16 SPONDYLOLISTHESIS OF LUMBAR REGION: ICD-10-CM

## 2025-02-17 PROCEDURE — 1160F RVW MEDS BY RX/DR IN RCRD: CPT | Performed by: PHYSICAL MEDICINE & REHABILITATION

## 2025-02-17 PROCEDURE — 99214 OFFICE O/P EST MOD 30 MIN: CPT | Performed by: PHYSICAL MEDICINE & REHABILITATION

## 2025-02-17 PROCEDURE — 1125F AMNT PAIN NOTED PAIN PRSNT: CPT | Performed by: PHYSICAL MEDICINE & REHABILITATION

## 2025-02-17 PROCEDURE — 1159F MED LIST DOCD IN RCRD: CPT | Performed by: PHYSICAL MEDICINE & REHABILITATION

## 2025-02-17 NOTE — PATIENT INSTRUCTIONS
Plan  I will perform bilateral L5 TFESI(s).    She will look into surgery for the left 3rd finger trigger finger.    If she decides not to have the surgery for the trigger finger, she will let me know and I will do the injection for it.    She will get into the PT for the lumbar spine after she has had the injections.    The patient will follow up in 3 months, but the patient will call me 2 weeks after having the injection to let me know how the injection worked.

## 2025-02-17 NOTE — PROGRESS NOTES
Low Back Pain H & P    Chief Complaint:   Chief Complaint   Patient presents with    Follow - Up     10/15/24 LOV. She was unable to get into PT for L spine because she fell twice between her LOV and now. Gabapentin 600mg AM and 900mg PM. T/n in L leg. Pain 4/10. Fells like her L leg is weak.      Nursing note reviewed and verified.    Patient was last seen on 10/15/2024.  She has had 2 falls since she was last here.  One was in November and one was in December.  The first one, she landed on her left side and this caused significant rib pain which took a while to heal.  She fell due to missing a step when going down one stair.  The second fall, she tripped over a beam and landed on her left knee.  This caused a lot of knee pain.  Due to these issues, she was not able to do any of the PT.      Description of the Pain  The pain is located in the bilateral low back.    The pain radiates to the bilateral buttock, bilateral posterior thigh, and bilateral posterior lower leg..  The pain at its best is 3/10. The pain at its worst is 9/10. The pain is currently  3/10.  The pain is described as a(n) aching, electrical, and tightness sensation.    The pain is worse when extending and walking.    The pain is better sitting.  There is no numbness.  There is tingling in the left > right anterior thigh, left > right posterior thigh, left > right lateral thigh, left > right medial thigh, left > right anterior lower leg, left > right posterior lower leg, left > right medial lower leg, and left > right lateral lower leg.  This is improved with gabapentin.    There is weakness in the left leg.  She has difficulty raising the left leg up at the hip.    She has developed the triggering of the left 3rd finger again.    Past Medical History   Past Medical History:    Asthma (HCC)    Breast cancer (HCC)    right breast    Esophageal reflux    Extrinsic asthma, unspecified    Fibromyalgia    Hiatal hernia with GERD    High blood pressure     Insomnia    Osteopenia    Peripheral neuropathy    Raynaud's phenomenon    SLE (systemic lupus erythematosus) (HCC)    Dr Valadez    Stress fracture of the metatarsals    4th MT       Past Surgical History   Past Surgical History:   Procedure Laterality Date    Ankle fracture surgery Left     Cholecystectomy  1998    laparoscopic     Colonoscopy  12/12/2014    Electrocardiogram, complete      Scanned to media tab - DOS 04-    Hemorrhoidectomy      Hysterectomy  1992    laparoscopic (endometriosis)    Kaveh biopsy stereo nodule 1 site right (cpt=19081)  09/26/2018    Kaveh localization wire 1 site right (cpt=19281)  1992    Mastectomy right  11/01/2018    Other surgical history  11/01/2018    right breast mastecetomy    Radiation right      12/31/18-2/2019       Family History   Family History   Problem Relation Age of Onset    Prostate Cancer Father 57        d.62 metastatic    Heart Attack Father         MI    Cancer Mother 27        thyroid; lung @ 63 (smoker)    Heart Disease Mother     Cancer Maternal Grandmother 45        cervical/uterine    Cancer Maternal Aunt 75        pancreatic ca; fmr smoker; d.75    Pancreatic Cancer Maternal Aunt 75    Cancer Sister 61        thyroid ca; sx only    Prostate Cancer Brother 49        surgery    Cancer Maternal Uncle 40        bladder ca, non-smoker    Cancer Maternal Grandfather 60        lymphoma    Cancer Paternal Grandfather         brain ca; dx on autopsy; d.55    Other (drug addict) Son     Cancer Maternal Aunt 62        lung ca; smoker; d.62    Cancer Maternal Aunt 72        lung ca; smoker; d.72    Cancer Maternal Cousin Female 51        thyroid ca    Breast Cancer Maternal Cousin Female 70    Prostate Cancer Paternal Uncle 75        seeds    Breast Cancer Self 63       Social History   Social History     Socioeconomic History    Marital status:      Spouse name: Not on file    Number of children: 2    Years of education: Not on file    Highest  education level: Not on file   Occupational History    Not on file   Tobacco Use    Smoking status: Never    Smokeless tobacco: Never   Vaping Use    Vaping status: Never Used   Substance and Sexual Activity    Alcohol use: No    Drug use: No    Sexual activity: Not on file   Other Topics Concern     Service Not Asked    Blood Transfusions Not Asked    Caffeine Concern Yes     Comment: Coffee 2 cups daily; Soda    Occupational Exposure Not Asked    Hobby Hazards Not Asked    Sleep Concern Not Asked    Stress Concern Not Asked    Weight Concern Not Asked    Special Diet Not Asked    Back Care Not Asked    Exercise Yes     Comment: walking    Bike Helmet Not Asked    Seat Belt Not Asked    Self-Exams Not Asked   Social History Narrative    The patient does not use an assistive device..      The patient does not live in a home with stairs.     Social Drivers of Health     Food Insecurity: No Food Insecurity (1/13/2025)    NCSS - Food Insecurity     Worried About Running Out of Food in the Last Year: No     Ran Out of Food in the Last Year: No   Transportation Needs: No Transportation Needs (1/13/2025)    NCSS - Transportation     Lack of Transportation: No   Stress: Not on file   Housing Stability: Not At Risk (1/13/2025)    NCSS - Housing/Utilities     Has Housing: Yes     Worried About Losing Housing: No     Unable to Get Utilities: No       PE:  The patient does appear in her stated age in no distress.  The patient is well groomed.    Psychiatric:  The patient is alert and oriented x 3.  The patient has a normal affect and mood.      Respiratory:  No acute respiratory distress. Patient does not have a cough.    HEENT:  Extraocular muscles are intact. There is no kern icterus. Pupils are equal, round, and reactive to light. No redness or discharge bilaterally.    Skin:  There are no rashes or lesions.    Vitals:  Vitals:    02/17/25 1544   Pulse: 77       Gait:    Gait: Normal gait   Sit to Stand: no  difficulty      Lumbar Spine:    Scoliosis: No scoliosis present     Lumbar Spine Palpation:    Spinous Processes: Tender at L4, L5, and S1   Z-joints: Tender at  right L5-S1   SIJ: Tender at bilateral SIJ   Piriformis Muscle: Non-tender bilateral Piriformis muscles   Greater Trochanteric Bursa: Tender at bilateral Greater Trochanteric Bursa(s)     Vascular lower extremity:   Dorsalis pedis pulse-RIGHT 2+   Dorsalis pedis pulse-LEFT 2+   Tibialis posterior pulse-RIGHT 2+   Tibialis posterior pulse-LEFT 2+     Neurological Lower Extremity:    Light Touch Sensation: Intact in bilateral Lower Extremities   LE Muscle Strength: All LE strength measurements 5/5 except:  Hamstring RIGHT:   4+/5  Hamstring LEFT:   4+/5   RIGHT plantar reflexes: downward response   LEFT plantar reflexes: downward response   Reflexes: 2+ in bilateral lower extremities     Left Hand:  There is catching of the left finger with flexion.    Assessment  1. Primary cancer of right breast with stage 2 sheila metastasis per American Joint Committee on Cancer 7th edition (N2) (Formerly [facility])    2. Asthma with COPD (chronic obstructive pulmonary disease) (Formerly [facility])    3. Severe obesity (BMI 35.0-39.9) with comorbidity ([facility]ly [facility])    4. Annular tear of lumbar disc: L4-5 left far lateral    5. L1-2 right mild, L3-4 mild diffuse & left mild-mod far lateral, L4-5 mild-mod central & bilateral mod far lateral, L5-S1 left mild-mod foraminal & mild central bulging discs    6. L4-5 right mild/left mild-moderate, L3-4 left mild foraminal stenosis    7. left > right L5-S1 radiculopathy    8. L3-4 grade 1, L4-5 grade 1 spondylolisthesis    9. Lymphedema of right arm    10. Acquired trigger finger of left middle finger         Plan  I will perform bilateral L5 TFESI(s).    She will look into surgery for the left 3rd finger trigger finger.    If she decides not to have the surgery for the trigger finger, she will let me know and I will do the injection for it.    She will get into the PT  for the lumbar spine after she has had the injections.    The patient will follow up in 3 months, but the patient will call me 2 weeks after having the injection to let me know how the injection worked.    The patient understands and agrees with the stated plan.  Fransisco Ambrosio MD  2/17/2025

## 2025-02-18 ENCOUNTER — TELEPHONE (OUTPATIENT)
Dept: PHYSICAL MEDICINE AND REHAB | Facility: CLINIC | Age: 70
End: 2025-02-18

## 2025-02-18 DIAGNOSIS — M54.16 LUMBAR RADICULOPATHY: Primary | ICD-10-CM

## 2025-02-18 NOTE — TELEPHONE ENCOUNTER
Patient has been scheduled for Bilateral L5 transforaminal epidural steroid injection on 2/21/25 at the [facility] with Dr. Ambrosio.   Anesthesia type: Local  Please note: The [facility] will call the business day prior to discuss the exact time/arrival and additional instructions for your appointment.  Patient was advised that if he/she does receive the covid vaccine it needs to be at least 2 weeks before or after the injection.  Medications and allergies reviewed.  Educated to hold NSAIDS (Aleve, Ibuprofen, Motrin, Advil) and anti-inflammatories (Meloxicam, Naproxen, Diclofenac, Celebrex) and for cervical injections must hold Multi-Vitamins, Vitamin E, Fish Oil/Omega-3.  Patient informed of [facility]'s  policy:  The patient will require transportation arrangements to and from the procedure, with the  present on site for the entire visit.  Without a , the appointment is subject to cancellation.    [facility] is located in the [facility] [address] [address].   may park in the yellow/purple parking lot.  Patient verbalized understanding and agrees with plan.  Scheduled in Epic: Yes  Scheduled in Surgical Case: Yes  Follow up appointment made: NOV: 6/3/2025 Fransisco Ambrosio MD

## 2025-02-18 NOTE — TELEPHONE ENCOUNTER
Initiated authorization for Bilateral L5 TFESI under fluoroscopic guidance. CPT/HCPCS 50640-01 dx:M54.16 to be done at [facility] with [employer] portal.    Status: Approved  Reference/Authorization # [id number]  Tracking #[id number]  Valid: 2/18/25-5/16/25  Authorization is not a guarantee of payment and may be subject to review once claim is submitted.

## 2025-02-19 LAB — HEMOGLOBIN A1C: 6.1 % OF TOTAL HGB

## 2025-02-20 ENCOUNTER — LAB ENCOUNTER (OUTPATIENT)
Dept: LAB | Age: 70
End: 2025-02-20
Attending: INTERNAL MEDICINE
Payer: MEDICARE

## 2025-02-20 DIAGNOSIS — R73.01 IMPAIRED FASTING GLUCOSE: ICD-10-CM

## 2025-02-20 DIAGNOSIS — M81.8 OTHER OSTEOPOROSIS WITHOUT CURRENT PATHOLOGICAL FRACTURE: ICD-10-CM

## 2025-02-20 LAB
ALBUMIN SERPL-MCNC: 4.8 G/DL (ref 3.2–4.8)
CALCIUM BLD-MCNC: 9.1 MG/DL (ref 8.7–10.4)
CREAT BLD-MCNC: 0.78 MG/DL
EGFRCR SERPLBLD CKD-EPI 2021: 83 ML/MIN/1.73M2 (ref 60–?)
EST. AVERAGE GLUCOSE BLD GHB EST-MCNC: 126 MG/DL (ref 68–126)
HBA1C MFR BLD: 6 % (ref ?–5.7)

## 2025-02-20 PROCEDURE — 36415 COLL VENOUS BLD VENIPUNCTURE: CPT

## 2025-02-20 PROCEDURE — 82565 ASSAY OF CREATININE: CPT

## 2025-02-20 PROCEDURE — 83036 HEMOGLOBIN GLYCOSYLATED A1C: CPT

## 2025-02-20 PROCEDURE — 82310 ASSAY OF CALCIUM: CPT

## 2025-02-20 PROCEDURE — 82040 ASSAY OF SERUM ALBUMIN: CPT

## 2025-02-24 ENCOUNTER — OFFICE VISIT (OUTPATIENT)
Age: 70
End: 2025-02-24
Attending: INTERNAL MEDICINE
Payer: MEDICARE

## 2025-02-24 VITALS
WEIGHT: 187.63 LBS | HEART RATE: 67 BPM | SYSTOLIC BLOOD PRESSURE: 153 MMHG | RESPIRATION RATE: 16 BRPM | TEMPERATURE: 98 F | DIASTOLIC BLOOD PRESSURE: 68 MMHG | OXYGEN SATURATION: 97 % | HEIGHT: 61 IN | BODY MASS INDEX: 35.43 KG/M2

## 2025-02-24 DIAGNOSIS — M81.8 OTHER OSTEOPOROSIS WITHOUT CURRENT PATHOLOGICAL FRACTURE: Primary | ICD-10-CM

## 2025-02-24 RX ORDER — ZOLEDRONIC ACID 0.05 MG/ML
5 INJECTION, SOLUTION INTRAVENOUS ONCE
Status: COMPLETED | OUTPATIENT
Start: 2025-02-24 | End: 2025-02-24

## 2025-02-24 RX ORDER — ZOLEDRONIC ACID 0.05 MG/ML
5 INJECTION, SOLUTION INTRAVENOUS ONCE
Status: CANCELLED | OUTPATIENT
Start: 2025-02-24

## 2025-02-24 RX ORDER — ZOLEDRONIC ACID 0.05 MG/ML
INJECTION, SOLUTION INTRAVENOUS
Status: COMPLETED
Start: 2025-02-24 | End: 2025-02-24

## 2025-02-24 RX ADMIN — ZOLEDRONIC ACID 5 MG: 0.05 INJECTION, SOLUTION INTRAVENOUS at 14:58:00

## 2025-02-24 NOTE — PROGRESS NOTES
Patient arrives to infusion for Reclast. Patient denies any issues or concerns. Pt denies any recent or planned invasive dental work.     Ordering Provider: Kim Cisneros MD  Order Exp: after this dose     Patient appeared to tolerate infusion without difficulty or complaint. No s/s of adverse reaction noted.     Reviewed next apt date/time: pt to follow up with provider, pt verbalized understanding.     Education Record  Learner:  Patient  Disease / Diagnosis: Osteoporosis  Barriers / Limitations:  None  Method:  Discussion  General Topics:  Medication, Precautions, Side effects and symptom management, and Plan of care reviewed  Outcome:  Shows understanding

## 2025-03-04 ENCOUNTER — TELEPHONE (OUTPATIENT)
Dept: PHYSICAL THERAPY | Facility: HOSPITAL | Age: 70
End: 2025-03-04

## 2025-03-05 ENCOUNTER — APPOINTMENT (OUTPATIENT)
Dept: PHYSICAL THERAPY | Age: 70
End: 2025-03-05
Attending: PHYSICAL MEDICINE & REHABILITATION
Payer: MEDICARE

## 2025-03-10 ENCOUNTER — OFFICE VISIT (OUTPATIENT)
Dept: PHYSICAL THERAPY | Age: 70
End: 2025-03-10
Attending: PHYSICAL MEDICINE & REHABILITATION
Payer: MEDICARE

## 2025-03-10 PROCEDURE — 97163 PT EVAL HIGH COMPLEX 45 MIN: CPT

## 2025-03-10 PROCEDURE — 97530 THERAPEUTIC ACTIVITIES: CPT

## 2025-03-10 PROCEDURE — 97110 THERAPEUTIC EXERCISES: CPT

## 2025-03-10 NOTE — PROGRESS NOTES
SPINE EVALUATION:     Diagnosis:   Annular tear of lumbar disc (M51.369)  Lumbar disc disease (M51.9)  Lumbar foraminal stenosis (M48.061)  Lumbar radiculopathy (M54.16)  Spondylolisthesis of lumbar region (M43.16) Patient:  Gemini Mendoza (69 year old, female)        Referring Provider: Fransisco Ambrosio  Today's Date   3/10/2025    Precautions:  Cancer; Other (use comment); Drug Allergy; Fall Risk (Primary cancer of right breast with stage 2 sheila metastasis)   Date of Evaluation: 03/10/25  Next MD visit: No data recorded  Date of Surgery: 2018 (partial mastectomy R)     PATIENT SUMMARY   Summary of chief complaints: pain in lowerback  going  to (L) foot; c/o numness and tingling and weakness. Recent h/o falls d/t LE weakness and loss of balance prior treatmet:  2 cortisone shots 2 weeks ago , PT with good prognosis  History of current condition: h/o falls: 2 falls in  the past 3 months   Pain level: current 0 /10, at best 0 /10, at worst 6 /10  Description of symptoms: pain in (L) hip radiating to foot.   Occupation: retired   Leisure activities/Hobbies:     Prior level of function: mod -severe discomfort with aal functional ADLs prior to shots  Current limitations: mod discomfort sleeping ,sitting , sit to stand, walking/ standing <15 minutes, getting in/out of car, stairs  Pt goals: to be able to walk further and balance to be better  Red flag signs/symptoms: Pt denies dizziness, drop attacks, dysphagia, dysarthria, diplopia; Pt denies pain that wakes in sleep, fever, recent trauma, history of CA, pain unchanged with movement/activity    Past medical history was reviewed with Gemini.  Significant findings include: Primary cancer of right breast with stage 2 sheila metastasis,asthma with COPD, Severe obesity with comorbidity,Lymphedema of right arm , Acquired trigger finger of left middle finger, systemic lupes, fibromyalgia  Imaging/Tests:     Gemini  has a past medical history of Asthma (HCC), Breast  cancer ([facility]) (11/2018), Esophageal reflux, Extrinsic asthma, unspecified, Fibromyalgia, Hiatal hernia with GERD, High blood pressure, Insomnia, Osteopenia, Peripheral neuropathy, Raynaud's phenomenon, SLE (systemic lupus erythematosus) ([facility]), and Stress fracture of the metatarsals.  She  has a past surgical history that includes electrocardiogram, complete; colonoscopy (12/12/2014); cholecystectomy (1998); hemorrhoidectomy; ankle fracture surgery (Left); other surgical history (11/01/2018); riya localization wire 1 site right (cpt=19281) (1992); hysterectomy (1992); mastectomy right (11/01/2018); radiation right; and riya biopsy stereo nodule 1 site right (cpt=19081) (09/26/2018).    ASSESSMENT  Gemini presents to physical therapy evaluation with primary c/o pain in lowerback  going  to (L) foot; c/o numness and tingling and weakness. Recent h/o falls d/t LE weakness and loss of balance prior treatmet:  2 cortisone shots 2 weeks ago , PT with good prognosis. The results of the objective tests and measures show  . Functional deficits include but are not limited to mod discomfort sleeping ,sitting , sit to stand, walking/ standing <15 minutes, getting in/out of car, stairs. Signs and symptoms are consistent with diagnosis of Annular tear of lumbar disc (M51.369)  Lumbar disc disease (M51.9)  Lumbar foraminal stenosis (M48.061)  Lumbar radiculopathy (M54.16)  Spondylolisthesis of lumbar region (M43.16). Pt and PT discussed evaluation findings, pathology, POC and HEP.  Pt voiced understanding and performs HEP correctly without reported pain. Skilled Physical Therapy is medically necessary to address the above impairments and reach functional goals.    OBJECTIVE:    Musculoskeletal:  Observation/Posture: rounded shoulders; scapular abduction   Accessory Motion: Lymphedema in (B) LE   Palpation: tenderness on L4-L5, (L) SI joint , PSIS , groin , (L) IT Band     Special tests:   Seated slump test: Negative; Supine SLR test  : Positive (L)      ROM and Strength:  (* denotes performed with pain)  Hip   ROM MMT (-/5)    R L R L     Flex (L2) 125 110 4 3+     Ext  15 14 4 3+     Abd 45 35 4 4     ER WFL WFL 4 4     IR WFL restricted 4 4        Flexibility:  LE Flexibility R L     Hip Flexor min restricted mod restricted     Hamstrings min restricted mod restricted     ITB min restricted mod restricted     Piriformis min restricted mod restricted     Quads min restricted mod restricted     Gastroc-soleus WNL min restricted       Neurological:  Sensation: WFL except  hypersensitivity to light pressure on (B) LE (L)>(R)  Deep Tendon Reflexes: WFL except  (B) Knee jerk : diminished  UMN:      Dia's:       Clonus:       Babinski:       Peripheral Neurodynamic: WNL except     Balance and Functional Mobility:  Gait: pt ambulates on level ground with decreased step length; decreased stance phase; decreased foot clearance; difficulty with initiation.  Balance: SLS: EO R 2 sec, EO L 5 sec          Today's Treatment and Response:   Pt education was provided on exam findings, treatment diagnosis, treatment plan, expectations, and prognosis.  Today's Treatment       3/10/2025   Spine Treatment   Therapeutic Exercise - 3 way hip stretch with strap:   - supine LTR  - supine SKTC   Therapeutic Activity Posture awareness, instructed on wearing a compression sleeve or ace wrap in LE to control lymphedema. Pt educated on core activation and HEP.   Therapeutic Exercise Minutes 14   Therapeutic Activity Minutes 17   Total Time Of Timed Procedures 31   Total Time Of Service-Based Procedures 0   Total Treatment Time 31        Patient was instructed in and issued a HEP for:  core activation , Hip and Lumbar AROM , LE stretches, ankle pumps    Charges:  PT EVAL: High Complexity, 1; TE: 1, TA : 1  In agreement with evaluation findings and clinical rationale, this evaluation involved HIGH COMPLEXITY decision making due to 3 or more personal  factors/comorbidities, 4 or more body structures involved/activity limitations, and unstable symptoms as documented in the evaluation.                                                                         PLAN OF CARE:    Goals: (to be met in 8 visits)    Not Met Progress Toward Partially Met Met   Pt will improve transversus abdominis recruitment to perform proper isometric contraction without requiring verbal or tactile cuing to promote advancement of therex. [] [] [] []   Pt will demonstrate good understanding of proper posture and body mechanics to decrease pain and improve spinal safety. [] [] [] []   Pt will achieve >13 sec with (B) single leg balance to assist with dressing [] [] [] []   Pt will report improved symptom centralization and absence of radicular symptoms for 3 consecutive days to improve function with ADLs. [] [] [] []   Pt will have decreased paraspinal mm tension to tolerate standing >40 minutes for work and home activities. [] [] [] []   Pt will demonstrate improved LE strength  to 5/5 to be able to squat to lift  >8 lbs floor with good body mechanics  with <2/10 pain. [] [] [] []   Pt will be independent and compliant with comprehensive HEP to maintain progress achieved in PT  Pt to report 75% decrease in radicular symptoms in (L) LE [] [] [] []        Frequency / Duration: Patient will be seen 2/weekx/week or a total of 8  visits over a 90 day period. Treatment will include: Manual Therapy; Neuromuscular Re-education; Self-Care Home Management; Therapeutic Activities; Therapeutic Exercise; Home Exercise Program instruction; Electrical stimulation (unattended)    Education or treatment limitation: None   Rehab Potential: good   Patient/Family/Caregiver was advised of these findings, precautions, and treatment options and has agreed to actively participate in planning and for this course of care.    Thank you for your referral. Please co-sign or sign and return this letter via fax as soon as  possible to [phone]. If you have any questions, please contact me at Dept: [phone]    Sincerely,  Electronically signed by therapist: Yahaira Guthrie PT  Physician's certification required: Yes  I certify the need for these services furnished under this plan of treatment and while under my care.    X___________________________________________________ Date____________________    Certification From: 3/10/2025  To:6/8/2025

## 2025-03-25 ENCOUNTER — HOSPITAL ENCOUNTER (OUTPATIENT)
Age: 70
Discharge: HOME | End: 2025-03-25
Payer: MEDICARE

## 2025-03-25 VITALS
RESPIRATION RATE: 18 BRPM | HEART RATE: 77 BPM | SYSTOLIC BLOOD PRESSURE: 131 MMHG | DIASTOLIC BLOOD PRESSURE: 74 MMHG | TEMPERATURE: 98 F | OXYGEN SATURATION: 98 %

## 2025-03-25 DIAGNOSIS — N30.01 ACUTE CYSTITIS WITH HEMATURIA: Primary | ICD-10-CM

## 2025-03-25 LAB
BILIRUB UR QL STRIP: NEGATIVE
COLOR UR: YELLOW
GLUCOSE UR STRIP-MCNC: NEGATIVE MG/DL
KETONES UR STRIP-MCNC: NEGATIVE MG/DL
NITRITE UR QL STRIP: NEGATIVE
PH UR STRIP: 5.5 [PH]
PROT UR STRIP-MCNC: 30 MG/DL
SP GR UR STRIP: 1.01
UROBILINOGEN UR STRIP-ACNC: <2 MG/DL

## 2025-03-25 PROCEDURE — 99213 OFFICE O/P EST LOW 20 MIN: CPT | Performed by: NURSE PRACTITIONER

## 2025-03-25 PROCEDURE — 81002 URINALYSIS NONAUTO W/O SCOPE: CPT | Performed by: NURSE PRACTITIONER

## 2025-03-25 RX ORDER — NITROFURANTOIN 25; 75 MG/1; MG/1
100 CAPSULE ORAL 2 TIMES DAILY
Qty: 14 CAPSULE | Refills: 0 | Status: SHIPPED | OUTPATIENT
Start: 2025-03-25 | End: 2025-04-01

## 2025-03-25 NOTE — DISCHARGE INSTRUCTIONS
As discussed, positive bladder infection on urine sample provided.  We have sent urine for culture.  Will have results in 48 hours.  Some will contact you if change in your antibiotics are warranted.  Drink plenty of water within your fluid limitation/restriction.    If you have any worsening urinary symptoms, inability urinate, large amount of blood in your urine, abdominal/pelvic pain, flank pain, back pain, fever, chills, nausea, vomiting, please go to ER.

## 2025-03-25 NOTE — ED PROVIDER NOTES
Patient Seen in: [facility]      History     Chief Complaint   Patient presents with    Urinary Symptoms     Stated Complaint: UTI    Subjective: This is a 69-year-old female, past medical history of asthma, systemic lupus, fibromyalgia, GERD, hypertension, Raynaud's, presents to immediate care clinic for evaluation of urinary symptoms since yesterday.  Positive urinary frequency.  Warm sensation from chest down with urination.  Urinary frequency and urgency.  No dysuria.  No hematuria.  No retention.  No abdominal pain, flank pain, back pain.  No nausea or vomiting.  No fever or chills.  Well-appearing.  GCS 15  The history is provided by the patient.             Objective:     No pertinent past medical history.            No pertinent past surgical history.              No pertinent social history.            Review of Systems   Constitutional: Negative.    Gastrointestinal: Negative.    Genitourinary:  Positive for frequency and urgency. Negative for decreased urine volume, difficulty urinating, dysuria, pelvic pain, vaginal bleeding, vaginal discharge and vaginal pain.   Musculoskeletal: Negative.    Skin: Negative.        Positive for stated complaint: UTI  Other systems are as noted in HPI.  Constitutional and vital signs reviewed.      All other systems reviewed and negative except as noted above.    Physical Exam     ED Triage Vitals [03/25/25 1811]   /74   Pulse 77   Resp 18   Temp 98.2 °F (36.8 °C)   Temp src Oral   SpO2 98 %   O2 Device None (Room air)       Current Vitals:   Vital Signs  BP: 131/74  Pulse: 77  Resp: 18  Temp: 98.2 °F (36.8 °C)  Temp src: Oral    Oxygen Therapy  SpO2: 98 %  O2 Device: None (Room air)        Physical Exam  Constitutional:       General: She is not in acute distress.     Appearance: Normal appearance. She is not ill-appearing or toxic-appearing.   Cardiovascular:      Rate and Rhythm: Normal rate and regular rhythm.      Pulses: Normal pulses.      Heart  sounds: Normal heart sounds.   Pulmonary:      Effort: Pulmonary effort is normal.      Breath sounds: Normal breath sounds.   Abdominal:      General: Abdomen is flat.      Tenderness: There is no abdominal tenderness. There is no right CVA tenderness, left CVA tenderness, guarding or rebound.   Skin:     General: Skin is warm.      Capillary Refill: Capillary refill takes less than 2 seconds.   Neurological:      General: No focal deficit present.      Mental Status: She is alert and oriented to person, place, and time.             ED Course     Labs Reviewed   [facility] POCT URINALYSIS DIPSTICK - Abnormal; Notable for the following components:       Result Value    Urine Clarity Slightly cloudy (*)     Protein urine 30 (*)     Blood, Urine Large (*)     Leukocyte esterase urine Moderate (*)     All other components within normal limits   URINE CULTURE, ROUTINE                   MDM        Differentials  considered include: Acute cystitis with hematuria, acute cystitis without hematuria, overactive bladder, other    Patient urine sample obtained, moderate amount leukocytes, large amount of blood.  Positive protein.    There is no suprapubic tenderness or distention on examination.  No CVA tenderness to percussion.  Denies retention or hematuria.    NO Suspicion for pyelonephritis and nephrolithiasis.  Afebrile.  No systemic symptoms.  No nausea or vomiting.  Appearing on exam.    Will send urine for culture.  Will start patient on Macrobid which she has tolerated in the past.    Patient is aware to stay well-hydrated within her fluid limitation.    Educated patient on red flag signs symptoms that warrant ER evaluation.  She verbalized understanding agrees with plan of care.        Medical Decision Making      Disposition and Plan     Clinical Impression:  1. Acute cystitis with hematuria         Disposition:  Discharge  3/25/2025  6:36 pm    Follow-up:  Garrick Croft MD  [address] [place] [address]  [id number]  [phone]      As needed          Medications Prescribed:  Discharge Medication List as of 3/25/2025  6:38 PM        START taking these medications    Details   nitrofurantoin monohydrate macro 100 MG Oral Cap Take 1 capsule (100 mg total) by mouth 2 (two) times daily for 7 days., Normal, Disp-14 capsule, R-0                 Supplementary Documentation:

## 2025-03-26 ENCOUNTER — TELEPHONE (OUTPATIENT)
Dept: PHYSICAL THERAPY | Facility: HOSPITAL | Age: 70
End: 2025-03-26

## 2025-03-26 ENCOUNTER — APPOINTMENT (OUTPATIENT)
Dept: PHYSICAL THERAPY | Age: 70
End: 2025-03-26
Attending: PHYSICAL MEDICINE & REHABILITATION
Payer: MEDICARE

## 2025-03-31 ENCOUNTER — OFFICE VISIT (OUTPATIENT)
Dept: PHYSICAL THERAPY | Age: 70
End: 2025-03-31
Attending: PHYSICAL MEDICINE & REHABILITATION
Payer: MEDICARE

## 2025-03-31 PROCEDURE — 97110 THERAPEUTIC EXERCISES: CPT

## 2025-03-31 NOTE — PROGRESS NOTES
Patient: Gemini Mendoza (69 year old, female) Referring Provider:  Insurance:   Diagnosis: Annular tear of lumbar disc (M51.369)  Lumbar disc disease (M51.9)  Lumbar foraminal stenosis (M48.061)  Lumbar radiculopathy (M54.16)  Spondylolisthesis of lumbar region (M43.16) Fransisco KEITH [facility]   Date of Surgery: 2018 (partial mastectomy R) Next MD visit:  N/A   Precautions:  Cancer; Other (use comment); Drug Allergy; Fall Risk (Primary cancer of right breast with stage 2 sheila metastasis) No data recorded Referral Information:    Date of Evaluation: Req: 8, Auth: 8, Exp: 6/10/2025    03/10/25 POC Auth Visits:  8       Today's Date   3/31/2025    Subjective  Patient states less pain than before. Decreased intensity and frequency. Sharp ain felt in (L) hip with twisting and turning movements.       Pain: 8/10     Objective: Min - mod tenderness on (L) hip and groin.     Assessment  Patient displays weakness in core and hip muscles, audible clicking in (L) hip. Focused on core activation and hip muscle strengtheing to improve lumbo pelvic mobility and rhythm. Manual stabilisation and assit required with core actvation and pelvic tilts.All exercises performed to fatigue without exacerbation of symtoms. Planning to progress with core strengtheing and spinal stabilisation exercises.    Goals (to be met in 8 visits)    Not Met Progress Toward Partially Met Met   Pt will improve transversus abdominis recruitment to perform proper isometric contraction without requiring verbal or tactile cuing to promote advancement of therex. [] [] [] []   Pt will demonstrate good understanding of proper posture and body mechanics to decrease pain and improve spinal safety. [] [] [] []   Pt will achieve >13 sec with (B) single leg balance to assist with dressing [] [] [] []   Pt will report improved symptom centralization and absence of radicular symptoms for 3 consecutive days to improve function with ADLs. [] [] [] []   Pt will have  decreased paraspinal mm tension to tolerate standing >40 minutes for work and home activities. [] [] [] []   Pt will demonstrate improved LE strength  to 5/5 to be able to squat to lift  >8 lbs floor with good body mechanics  with <2/10 pain. [] [] [] []   Pt will be independent and compliant with comprehensive HEP to maintain progress achieved in PT  Pt to report 75% decrease in radicular symptoms in (L) LE [] [] [] []            Plan  therapeutic exercises and activities to imporve core and hip  strength , lumbo pelvic rhythm,balance and coordination    Treatment Last 4 Visits  Treatment Day: 2       3/10/2025 3/31/2025   Spine Treatment   Therapeutic Exercise - 3 way hip stretch with strap:   - supine LTR  - supine SKTC   - seated on SB pelvic tilts/circles: 20 x 1 each  -seated on SB marches; 20 x 1   - supine LTR: 10 x 1   - supine SKTC -  - seated hip add hold: 20 x 1   Sit to stand with add squeeze: 10 x 2  Standing HR\" 20 x 1  Supine clams: 25 x 1  S/L clams : 20 x 1 each   -hooklying bridges: 10 x 2  - 3 way hip stretch with strap: 3 x 1 ; 10 sec   - 3 way stepping : 3 laps each           Manual Therapy  PROM (B) hips,passive  LE stretches   Therapeutic Activity Posture awareness, instructed on wearing a compression sleeve or ace wrap in LE to control lymphedema. Pt educated on core activation and HEP.    Therapeutic Exercise Minutes 14 46   Therapeutic Activity Minutes 17    Total Time Of Timed Procedures 31 46   Total Time Of Service-Based Procedures 0 0   Total Treatment Time 31 46   HEP  core activation , Hip and Lumbar AROM , LE stretches, ankle pumps        HEP  core activation , Hip and Lumbar AROM , LE stretches, ankle pumps    Charges: TE: 3

## 2025-04-03 RX ORDER — PANTOPRAZOLE SODIUM 40 MG/1
40 TABLET, DELAYED RELEASE ORAL
Qty: 90 TABLET | Refills: 3 | OUTPATIENT
Start: 2025-04-03

## 2025-04-03 NOTE — TELEPHONE ENCOUNTER
Pantoprazole changed to BID by GI at 2/12/2025 office visit and new Rx was sent at that time to the [facility] pharmacy in [place].   Refill request denied.    Requested Prescriptions     Refused Prescriptions Disp Refills    PANTOPRAZOLE 40 MG Oral Tab EC [Pharmacy Med Name: Pantoprazole Sodium Oral Tablet Delayed Release 40 MG] 90 tablet 3     Sig: TAKE 1 TABLET BEFORE BREAKFAST     Refused By: SCOTT REESE     Reason for Refusal: Refill not appropriate

## 2025-04-07 ENCOUNTER — OFFICE VISIT (OUTPATIENT)
Dept: PHYSICAL THERAPY | Age: 70
End: 2025-04-07
Attending: PHYSICAL MEDICINE & REHABILITATION
Payer: MEDICARE

## 2025-04-07 PROCEDURE — 97110 THERAPEUTIC EXERCISES: CPT

## 2025-04-07 NOTE — PROGRESS NOTES
Patient: Gemini Mendoza (69 year old, female) Referring Provider:  Insurance:   Diagnosis: Annular tear of lumbar disc (M51.369)  Lumbar disc disease (M51.9)  Lumbar foraminal stenosis (M48.061)  Lumbar radiculopathy (M54.16)  Spondylolisthesis of lumbar region (M43.16) [facility]ansisco KEITH [facility]   Date of Surgery: 2018 (partial mastectomy R) Next MD visit:  N/A   Precautions:  Cancer; Other (use comment); Drug Allergy; Fall Risk (Primary cancer of right breast with stage 2 sheila metastasis) No data recorded Referral Information:    Date of Evaluation: Req: 8, Auth: 8, Exp: 6/10/2025    03/10/25 POC Auth Visits:  8       Today's Date   4/7/2025    Subjective  Patient states feeling more stronger in her legs today, denies any pain. HEP compliance reported       Pain: 0/10     Objective  LE MMT: 4+/5, core MMT: 3+/5; glutes/hip extensors: 3+/5            Assessment  Patient displays improving LE strength and balance with functional ADLs. Improving weight bearing tolerance and gait mechanics.  Progressed with core activation and hip muscle strengtheing to improve lumbo pelvic mobility and rhythm. All exercises performed to fatigue without exacerbation of symtoms. Planning to progress with core strengtheing and spinal stabilisation exercises.       Plan  therapeutic exercises and activities to imporve core and hip  strength , lumbo pelvic rhythm,balance and coordination    Treatment Last 4 Visits  Treatment Day: 3       3/10/2025 3/31/2025 4/7/2025   Spine Treatment   Therapeutic Exercise - 3 way hip stretch with strap:   - supine LTR  - supine SKTC   - seated on SB pelvic tilts/circles: 20 x 1 each  -seated on SB marches; 20 x 1   - supine LTR: 10 x 1   - supine SKTC -  - seated hip add hold: 20 x 1   Sit to stand with add squeeze: 10 x 2  Standing HR\" 20 x 1  Supine clams: 25 x 1  S/L clams : 20 x 1 each   -hooklying bridges: 10 x 2  - 3 way hip stretch with strap: 3 x 1 ; 10 sec   - 3 way stepping : 3 laps each          -seated SAQs: 25 x 1 each RTB   - supine LTR: 10 x 1  - supine SKTC -5 x 1 ; 10 sec each  - seated marching : 25 x 1 RTB  -hooklying bridges with hip abd: 10 x 2  Side lying bridges; 5 x 4 sets each  - 3 way hip stretch with strap: 3 x 1 ; 10 sec  - 3 way stepping : RTB 3 laps each  Mini squats: 10 x 2   Supine clams: 25 x 1 RTB  S/L clams : 20 x 1 each RTB   - seated Elliptical : 8 minutes #5       Manual Therapy  PROM (B) hips,passive  LE stretches    Therapeutic Activity Posture awareness, instructed on wearing a compression sleeve or ace wrap in LE to control lymphedema. Pt educated on core activation and HEP.     Therapeutic Exercise Minutes 14 46 46   Therapeutic Activity Minutes 17     Total Time Of Timed Procedures 31 46 46   Total Time Of Service-Based Procedures 0 0 0   Total Treatment Time 31 46 46   HEP  core activation , Hip and Lumbar AROM , LE stretches, ankle pumps         HEP  core activation , Hip and Lumbar AROM , LE stretches, ankle pumps    Charges: TE: 3

## 2025-04-10 ENCOUNTER — OFFICE VISIT (OUTPATIENT)
Dept: SURGERY | Facility: CLINIC | Age: 70
End: 2025-04-10
Payer: MEDICARE

## 2025-04-10 VITALS
HEART RATE: 86 BPM | HEIGHT: 60.4 IN | DIASTOLIC BLOOD PRESSURE: 80 MMHG | BODY MASS INDEX: 35.42 KG/M2 | OXYGEN SATURATION: 96 % | SYSTOLIC BLOOD PRESSURE: 114 MMHG | WEIGHT: 182.81 LBS

## 2025-04-10 DIAGNOSIS — E78.5 DYSLIPIDEMIA: Primary | ICD-10-CM

## 2025-04-10 DIAGNOSIS — E66.9 OBESITY (BMI 30-39.9): ICD-10-CM

## 2025-04-10 DIAGNOSIS — R73.03 PREDIABETES: ICD-10-CM

## 2025-04-10 NOTE — PATIENT INSTRUCTIONS
[place] Diet    Hungry Root     Factor      Daily Calories:  120-174 lbs: 1200 calories/day.  175-219 lbs: 1500 calories/day.  220-249 lbs: 1800 calories/day.   250 lbs or more: 2,000 calories/day.    Add 2 tbsp flaxseed ground per day.     Aim for:   5 fruits and vegetable servings each day off of the American [employer] list.     30 minute walk, 5-6 days/week as tolerated. Exercise in ways you love.   2-3 days of strength training per week.   The New American or Healthy Plate Methods.   Keep meals between 7 am and 7 pm.   Track food and beverage intake using My Fitness Pal or Lose It.     Protein: 65  grams/day.    Carbs:  grams/day.    Aim for 2 healthy fats/day.     Take 250-500 mg magnesium glycinate each evening to improve sleep as needed. Now, Doctor's Best, Pure Encapsulations, Life Extension are good brands.   Magnesium salt baths weekly. 1 cup epsom salt; 1/2 cup baking soda.  Write 3 things great things that happened and that you're grateful for each night before bed.    A diet filled with a variety of vegetables, fruits, whole grains, beans, and other plant foods helps lower risk for many cancers. These include: apples, asparagus, blueberries, broccoli/crucifers, brussels sprouts, carrots, cauliflower, cherries, coffee, cranberries, flaxseed, garlic, grapefruit, grapes, kale, pulses (beans, peas, lentils), raspberries, soy, spinach, squash, strawberries, tea tomatoes, walnuts, whole grains (AICR, 2020).   Other recommendations:   I recommend a whole food, plant powered diet with low glycemic index:     Aim for 3 meals a day and 1-2 snacks as needed.    Aim for a protein + produce at each meal time.      Breakfast ideas:  1. Fruit and nuts/seeds.  2. Eggs scrambled with vegetables.  3. Oatmeal (stovetop), cinnamon, 2 tbsp flaxseed, berries, nuts.  4. Protein shake + fruit. (1 scoop with water/ice). Garden of Life Fit, Nutiva, Calhoun, and Orgain are good brands.      Snacks  ideas:  Raw vegetables and hummus, apples and peanut butter, nuts, seeds, fruit, pecans drizzled with organic honey.      Use the Healthy Plate method for lunch and dinner:  1/2 right side of plate non-starchy vegetables.  Bottom left 1/4 plate protein.  Top left 1/4 starch as desired.     Or Use the new American Plate Method for lunch and dinner:  1/3 animal protein/product  2/3 plants- fruits, vegetables, whole grains, legumes, nuts, seeds      Aim for a total of:  2 fruits a day (avocado, tomato, citrus/oranges, apples, berries)  1/2 cup or medium size    4 non-starchy vegetables (handful) (greens, peppers, onions, garlic, broccoli, cauliflower, etc.)    0-2 starches (oatmeal, sweet potato, carrots, brown rice, etc).    3 protein per day (fish, seafood, meat, or plants: salmon, nuts, seeds, shrimp, chicken, turkey, beans, lentils, chickpeas, etc).    2 healthy fats (avocado, avocado oil, olives, olive oil, salmon, nuts, seeds)    References     [employer] (AICR).  (2020). AICR's foods that fight cancer. Retrieved from https://www.aicr.org/cancer-prevention/food-facts/

## 2025-04-10 NOTE — PROGRESS NOTES
The [facility]ness [facility] Weight Loss Consultation Note       Date of Consult:  4/10/2025    Patient:  Gemini Mendoza  :      1955  MRN:      [id number]    Referring Provider: Dr. Beckham    Chief Complaint:    Chief Complaint   Patient presents with    Consult    Weight Management    Obesity       SUBJECTIVE     History of Present Illness:  Gemini Mendoza has been referred to me for evaluation and treatment.       68 yo female.  Presents to clinic for assistance with weight loss/maintenance.     Reports that she recently started metformin for prediabetes ordered by endocrinologist.   Has lost 10 lbs.     Patient is retired-  late 's business.  Patient lives with dog.  2 adult children     Patient's goal weight: 150 lbs  Biggest weight loss in the past: 50 lbs  How weight loss was achieved: Adela Izaguirre   Heaviest weight ever:  Previous use of medical weight loss medications:    Physical activity: Walks dog twice daily 15 minutes   In PT hip pain     Sleep: adequate hours/night    Sleep screening:       Past Medical History: Past Medical History[1]    OBJECTIVE     Vitals: /80 (BP Location: Left arm, Patient Position: Sitting, Cuff Size: large)   Pulse 86   Ht 5' 0.4\" (1.534 m)   Wt 182 lb 12.8 oz (82.9 kg)   SpO2 96%   BMI 35.23 kg/m²        Wt Readings from Last 6 Encounters:   04/10/25 182 lb 12.8 oz (82.9 kg)   25 187 lb 9.6 oz (85.1 kg)   25 191 lb (86.6 kg)   25 190 lb (86.2 kg)   25 191 lb (86.6 kg)   25 190 lb (86.2 kg)        Patient Medications:  Current Medications[2]    Allergies:  Lymphazurin [isosulfan blue], Elavil [amitriptyline hcl], Flexeril [cyclobenzaprine hcl], Nabumetone, Relafen, and Cephalexin     Comorbidities:  Dyslipidemia     Social History:  Reviewed     Surgical History:  Past Surgical History[3]    Family History:  Family History[4]      Typical Dietary Intake:  Breakfast AM Snack Lunch PM Snack Dinner   Banana   Coffee      Greek vanilla yogurt    Muffin today     Salad    James's tonight          After dinner behavior: -  Night eating: -  Portion sizes: -  Binge: -  Emotional:   Depression: Score: 3  Grazing: -  Sweet tooth:  Crunchy/salty:  Etoh: Rare  Drinks mostly water  Soda Drinker: No    Sports Drinks:  No    Juice: No    Number of restaurant or fast food meals/week:  rare occasions- meals/week    Nutritional Goals Reviewed and Discussed:     Eat 3-4 cups of fresh fruit or vegetables daily    Behavior Modifications Reviewed and Discussed:    Eat breakfast, Eat 3 meals per day, Plan meals in advance, Read nutrition labels, Drink 64oz of water per day, Maintain a daily food journal, Utilize portion control strategies to reduce calorie intake, Identify triggers for eating and manage cues, and Eat slowly and take 20 to 30 minutes to complete each meal      ROS:  Constitutional: negative  Respiratory: negative  Cardiovascular: negative  Gastrointestinal: positive for reflux symptoms  Integument/breast: negative  Hematologic/lymphatic: negative  Musculoskeletal:positive for left hip bursitis   Neurological: negative  Behavioral/Psych: negative  Endocrine:  prediabetes     Physical Exam:  General appearance: alert, appears stated age, cooperative and obese  Head: Normocephalic, without obvious abnormality, atraumatic  Neck: no adenopathy, no carotid bruit, no JVD, supple, symmetrical, trachea midline and thyroid not enlarged, symmetric, no tenderness/mass/nodules  Lungs: clear to auscultation bilaterally  Heart: S1, S2 normal, no murmur, click, rub or gallop, regular rate and rhythm  Abdomen: soft, non-tender; bowel sounds normal; no masses,  no organomegaly and abdomen obese   Extremities: intact, no edema   Pulses: 2+ and symmetric  Skin: intact   Neurologic: Grossly normal      ASSESSMENT         Encounter Diagnosis(ses):   1. Dyslipidemia    2. Obesity (BMI 30-39.9)    3. Prediabetes        PLAN         Diagnoses and all  orders for this visit:    Dyslipidemia  -     Jump Start Your Health; Future    Obesity (BMI 30-39.9)  -     Jump Start Your Health; Future    Prediabetes  -     Jump Start Your Health; Future      DYSLIPIDEMIA: Recommend dietary changes and lifestyle modifications as discussed below. Monitor.     Lab Results   Component Value Date/Time    CHOLEST 214 (H) 12/17/2024 01:38 PM     (H) 12/17/2024 01:38 PM    HDL 62 12/17/2024 01:38 PM    TRIG 160 (H) 12/17/2024 01:38 PM    VLDL 23 12/01/2021 10:14 AM    TCHDLRATIO 3.5 12/17/2024 01:38 PM       OBESITY/WEIGHT GAIN:    Recommended intensive lifestyle and behavioral modifications at this time for weight loss.    Educated patient on lifestyle modifications: Whole Food/Plant Strong/Low Glycemic Index diet, moderate alcohol consumption, reduced sodium intake to no more than 2,400 mg/day, and at least 150 minutes of moderate physical activity per week.   Avoid processed, poor quality carbohydrates, refined grains, flour, sugar.    Goals for next month:  1. Keep a food log.  2. Drink 64 ounces of non-caloric beverages per day. No fruit juices or regular soda.  3. Aim for 150 minutes moderate exercise per week.    4. Increase fruit and vegetable servings to 5-6 per day.    5. Improve sleep and stress.     Reviewed labs: Medicare.     Discussed patient can consider medication options for weight loss in the future as needed.   Specifically discussed OOP Zepbound option.      Plan to hold off on medications for weight loss at this time and implement lifestyle and behavioral modifications at this time.      Continue metformin (ordered by endocrinologist).    AICR/lifestyle recommendations per patient instructions.      Start Jump Start.     I spent 45 minutes preparing chart, obtaining/reviewing pertinent history, performing medically appropriate examination/evaluations, counseling/educating on assessment and plan, ordering and reviewing tests/medications as needed,  referring/communicating with other health care professionals as needed, documenting clinical information, interpreting results, communicating results, and/or coordinating patient care.     RTC 4 months or prn.    Olga Souza, SEKOU            [1]   Past Medical History:   Asthma (HCC)    Breast cancer (HCC)    right breast    Dyslipidemia    Esophageal reflux    Extrinsic asthma, unspecified    Fibromyalgia    Hiatal hernia with GERD    High blood pressure    Insomnia    Osteopenia    Peripheral neuropathy    Raynaud's phenomenon    SLE (systemic lupus erythematosus) ([facility])    Dr Valadez    Stress fracture of the metatarsals    4th MT   [2]   Current Outpatient Medications   Medication Sig Dispense Refill    pantoprazole 40 MG Oral Tab EC Take 1 tablet (40 mg total) by mouth 2 (two) times daily before meals. 180 tablet 0    metFORMIN  MG Oral Tablet 24 Hr Take 1 tablet (500 mg total) by mouth 2 (two) times daily with meals. (Patient not taking: Reported on 2/17/2025) 180 tablet 1    gabapentin 300 MG Oral Cap TAKE 2 CAPSULES EVERY MORNING AND TAKE 3 CAPSULES EVERY EVENING 450 capsule 3    furosemide 20 MG Oral Tab Take 1 tablet (20 mg total) by mouth every other day. 45 tablet 3    NORTRIPTYLINE 10 MG Oral Cap TAKE 2 CAPSULES EVERY NIGHT 180 capsule 3    FLUTICASONE PROPIONATE 50 MCG/ACT Nasal Suspension USE 2 SPRAYS NASALLY EVERY DAY 48 g 3    metoprolol succinate ER 50 MG Oral Tablet 24 Hr Take 1 tablet (50 mg total) by mouth daily. 90 tablet 3    Acetaminophen (TYLENOL EXTRA STRENGTH OR) Take by mouth. As needed      letrozole 2.5 MG Oral Tab Take 1 tablet (2.5 mg total) by mouth daily. 90 tablet 3    magnesium oxide 400 MG Oral Tab Take 1 tablet (400 mg total) by mouth daily. 90 tablet 3    Hydroxychloroquine Sulfate (PLAQUENIL) 200 MG Oral Tab Take 1 tablet (200 mg total) by mouth daily.      [facility] VIT D 5000 HIGH-POTENCY 5000 UNIT OR CAPS 1 CAPSULE DAILY     [3]   Past Surgical History:  Procedure  Laterality Date    Ankle fracture surgery Left     Cholecystectomy  1998    laparoscopic     Colonoscopy  12/12/2014    Electrocardiogram, complete      Scanned to media tab - DOS 04-    Hemorrhoidectomy      Hysterectomy  1992    laparoscopic (endometriosis)    Kaveh biopsy stereo nodule 1 site right (cpt=19081)  09/26/2018    Kvaeh localization wire 1 site right (cpt=19281)  1992    Mastectomy right  11/01/2018    Other surgical history  11/01/2018    right breast mastecetomy    Radiation right      12/31/18-2/2019   [4]   Family History  Problem Relation Age of Onset    Prostate Cancer Father 57        d.62 metastatic    Heart Attack Father         MI    Cancer Mother 27        thyroid; lung @ 63 (smoker)    Heart Disease Mother     Cancer Maternal Grandmother 45        cervical/uterine    Cancer Maternal Aunt 75        pancreatic ca; fmr smoker; d.75    Pancreatic Cancer Maternal Aunt 75    Cancer Sister 61        thyroid ca; sx only    Prostate Cancer Brother 49        surgery    Cancer Maternal Uncle 40        bladder ca, non-smoker    Cancer Maternal Grandfather 60        lymphoma    Cancer Paternal Grandfather         brain ca; dx on autopsy; d.55    Other (drug addict) Son     Cancer Maternal Aunt 62        lung ca; smoker; d.62    Cancer Maternal Aunt 72        lung ca; smoker; d.72    Cancer Maternal Cousin Female 51        thyroid ca    Breast Cancer Maternal Cousin Female 70    Prostate Cancer Paternal Uncle 75        seeds    Breast Cancer Self 63

## 2025-04-14 ENCOUNTER — OFFICE VISIT (OUTPATIENT)
Dept: INTERNAL MEDICINE CLINIC | Facility: CLINIC | Age: 70
End: 2025-04-14

## 2025-04-14 ENCOUNTER — TELEPHONE (OUTPATIENT)
Dept: INTERNAL MEDICINE CLINIC | Facility: CLINIC | Age: 70
End: 2025-04-14

## 2025-04-14 ENCOUNTER — OFFICE VISIT (OUTPATIENT)
Dept: PHYSICAL THERAPY | Age: 70
End: 2025-04-14
Attending: PHYSICAL MEDICINE & REHABILITATION
Payer: MEDICARE

## 2025-04-14 VITALS
BODY MASS INDEX: 35.07 KG/M2 | RESPIRATION RATE: 16 BRPM | HEIGHT: 60.05 IN | TEMPERATURE: 98 F | OXYGEN SATURATION: 98 % | HEART RATE: 68 BPM | SYSTOLIC BLOOD PRESSURE: 132 MMHG | DIASTOLIC BLOOD PRESSURE: 74 MMHG | WEIGHT: 181 LBS

## 2025-04-14 DIAGNOSIS — R60.9 EDEMA, UNSPECIFIED TYPE: ICD-10-CM

## 2025-04-14 DIAGNOSIS — G89.29 CHRONIC NONINTRACTABLE HEADACHE, UNSPECIFIED HEADACHE TYPE: ICD-10-CM

## 2025-04-14 DIAGNOSIS — I89.0 LYMPHEDEMA OF RIGHT ARM: ICD-10-CM

## 2025-04-14 DIAGNOSIS — R73.03 PREDIABETES: ICD-10-CM

## 2025-04-14 DIAGNOSIS — Z17.0 MALIGNANT NEOPLASM OF RIGHT BREAST IN FEMALE, ESTROGEN RECEPTOR POSITIVE, UNSPECIFIED SITE OF BREAST (HCC): ICD-10-CM

## 2025-04-14 DIAGNOSIS — E66.01 SEVERE OBESITY (BMI 35.0-39.9) WITH COMORBIDITY (HCC): ICD-10-CM

## 2025-04-14 DIAGNOSIS — I10 BENIGN HYPERTENSION: Primary | ICD-10-CM

## 2025-04-14 DIAGNOSIS — C50.911 MALIGNANT NEOPLASM OF RIGHT BREAST IN FEMALE, ESTROGEN RECEPTOR POSITIVE, UNSPECIFIED SITE OF BREAST (HCC): ICD-10-CM

## 2025-04-14 DIAGNOSIS — R51.9 CHRONIC NONINTRACTABLE HEADACHE, UNSPECIFIED HEADACHE TYPE: ICD-10-CM

## 2025-04-14 DIAGNOSIS — K21.9 GASTROESOPHAGEAL REFLUX DISEASE, UNSPECIFIED WHETHER ESOPHAGITIS PRESENT: ICD-10-CM

## 2025-04-14 DIAGNOSIS — M47.816 LUMBAR SPONDYLOSIS: ICD-10-CM

## 2025-04-14 DIAGNOSIS — G62.9 PERIPHERAL POLYNEUROPATHY: ICD-10-CM

## 2025-04-14 DIAGNOSIS — M32.9 SYSTEMIC LUPUS ERYTHEMATOSUS, UNSPECIFIED SLE TYPE, UNSPECIFIED ORGAN INVOLVEMENT STATUS (HCC): ICD-10-CM

## 2025-04-14 DIAGNOSIS — N28.1 KIDNEY CYSTS: ICD-10-CM

## 2025-04-14 DIAGNOSIS — M81.8 OTHER OSTEOPOROSIS, UNSPECIFIED PATHOLOGICAL FRACTURE PRESENCE: ICD-10-CM

## 2025-04-14 PROCEDURE — 3075F SYST BP GE 130 - 139MM HG: CPT | Performed by: INTERNAL MEDICINE

## 2025-04-14 PROCEDURE — 97110 THERAPEUTIC EXERCISES: CPT

## 2025-04-14 PROCEDURE — 1126F AMNT PAIN NOTED NONE PRSNT: CPT | Performed by: INTERNAL MEDICINE

## 2025-04-14 PROCEDURE — 99214 OFFICE O/P EST MOD 30 MIN: CPT | Performed by: INTERNAL MEDICINE

## 2025-04-14 PROCEDURE — 1159F MED LIST DOCD IN RCRD: CPT | Performed by: INTERNAL MEDICINE

## 2025-04-14 PROCEDURE — 3008F BODY MASS INDEX DOCD: CPT | Performed by: INTERNAL MEDICINE

## 2025-04-14 PROCEDURE — 3078F DIAST BP <80 MM HG: CPT | Performed by: INTERNAL MEDICINE

## 2025-04-14 PROCEDURE — 99499 UNLISTED E&M SERVICE: CPT | Performed by: INTERNAL MEDICINE

## 2025-04-14 PROCEDURE — 1160F RVW MEDS BY RX/DR IN RCRD: CPT | Performed by: INTERNAL MEDICINE

## 2025-04-14 PROCEDURE — G2211 COMPLEX E/M VISIT ADD ON: HCPCS | Performed by: INTERNAL MEDICINE

## 2025-04-14 RX ORDER — PANTOPRAZOLE SODIUM 40 MG/1
40 TABLET, DELAYED RELEASE ORAL
Qty: 90 TABLET | Refills: 3 | Status: SHIPPED | OUTPATIENT
Start: 2025-04-14

## 2025-04-14 RX ORDER — PANTOPRAZOLE SODIUM 40 MG/1
40 TABLET, DELAYED RELEASE ORAL
Qty: 90 TABLET | Refills: 3 | Status: CANCELLED | OUTPATIENT
Start: 2025-04-14 | End: 2026-04-09

## 2025-04-14 NOTE — PROGRESS NOTES
Gemini Mendoza is a 70 year old female.    HPI:     Chief Complaint   Patient presents with    Follow - Up     3 mos       71 y/o F with HTN, SLE here for F/U; SBP 120s on metoprolol ER 50 mg po every day;  no CP; no SOB; no headaches; no palpitation; leg edema controlled on Lasix 20 mg po every day prn; average use is every other day; left breast mammo in Dec 2024 was BiRads 2; SLE (systemic lupus erythematosus) controlled on Plaquenil 200 mg po qD as directed by Dr Valadez; on pantoprazole 40 mg po qD as heartburn has recurred; DEXA in Dec 2024 showed T-score -2.4 in left femoral neck; sees endocrinologist Dr Cisneros in Feb 2025; had Reclast 2/19./25; weight down from 190 to 181 pounds; goal weight 145 pounds; discussed Zepbound, though Rx not covered by insurance; seen at weight loss clinic; A1C 6.1% in Feb 2025; on metformin  mg po at bedtime; Rx per Dr Cisneros; s/p bilateral L5 ABRIL on 2/21/25 by Dr Ambrosio; has physical therapy weekly         HISTORY:  Past Medical History[1]   Past Surgical History[2]   Family History[3]   Social History: Short Social Hx on File[4]     Medications (Active prior to today's visit):  Current Medications[5]    Allergies:  Allergies[6]              ROS:   Constitutional: no weight loss; no fatigue  Cardiovascular:  Negative for chest pain; negative palpitations  Respiratory:  Negative for cough, dyspnea and wheezing  Gastrointestinal:  Negative for abdominal pain, constipation, decreased appetite, diarrhea and vomiting; no melena or hematochezia  All other review of systems are negative.        PHYSICAL EXAM:   Blood pressure 132/74, pulse 68, temperature 97.6 °F (36.4 °C), temperature source Oral, resp. rate 16, height 5' 0.05\" (1.525 m), weight 181 lb (82.1 kg), SpO2 98%.  Constitutional: alert and oriented x3 in no acute distress  HEENT- EOMI, PERRL  Nose/Mouth/Throat: pharynx without erythema; no oral lesions  Neck/Thyroid: neck supple; no thyromegaly  Cardiovascular: RRR,  S1, S2, no S3 or murmur  Respiratory: lungs without crackles or wheezes  Abdomen: normoactive bowel sounds, soft, non-tender and non-distended  Extremities: no clubbing, cyanosis or edema           ASSESSMENT/PLAN:   HTN  -SBP 120s on metoprolol ER 50 mg po qD  -(had no response to losartan)  -due to leg edema, stopped amlodipine     Edema  To both legs and both arms; may be side effect of amlodipine, venous insufficiency (edema worsens as day progresses), or lymphedema;  -on Lasix 20 mg po every day prn; average use is every other day     Kidney cyst  Kidney U/S with indeterminate 1.4 cm nodule to inferior pole of left kidney; MRI abdomen in Feb 2020 showed a 1.1 cm left renal cortical lesion in the inferior pole demonstrates mild enhancement consistent with a small neoplasm; pt seen by urologist Dr Cartwright and advised for surveillance imaging; repeat CT abdomen in Sept 2020 showed stable solid 1.4 cm left lower pole renal mass with 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 This exhibits persistent mild intralesional enhancement, concerning for neoplasm, which could be benign (series oncocytoma) or malignant. There has been no significant interval  change since 02/11/2020; MRI abdomen in Feb 2022 showed lesion along the inferior pole of the left kidney is decreased in size, measuring 0.7 x 0.7 cm; MRI abdomen in Feb 2023 and Feb 2024 showed stable findings; saw Dr Cartwright in March 2024; no add'l imaging is recommended; pt was discharged from urologic care     History of UTI  Had dysuria on 4/3/21; seen in urgent care; was initially given Macrobid; UCx grew >100K Proteus mirabilis; then was switched to Cipro 500 mg po BID #14; she finished Abx on 4/12; had noticed recurrent +dysuria, +bladder spasms,  +hematuria  -s/p amoxicillin 500 mg po TID #30     Breast cancer, right  Stage IIA [id number] IDC RUIQ s/p biopsy, ER 95/FL 90 Her2 neg, low oncotype Favorable Ki-67 8%, s/p right mastectomy and ALND with 1/18 LN positive, s/p adjuvant XRT on 2/11/2019 with Dr Griffin; currently on adjuvant hormonal blockade with letrozole 2.5 mg po qD under care of Dr Beckham; she does not plan to pursue breast reconstruction; left breast mammo in Dec 2024 was BiRads 2; DEXA was ordered by DR Clifton CASTREJON lymphedema  Has been followed by physical therapist, Roxi Torres; uses compression sleeve  Uses Lymphapress every other day to decreased RUE swelling  -pt uses Lymphapress sleeves to treat RUE lymphedema     SLE (systemic lupus erythematosus) (HCC)  on Plaquenil 200 mg po qD as directed by Dr Valadez; Rx per Dr Valadez--> F/U q 6 months; sees chiropractor; also goes to Dr Arteaga Daily at [facility] q 6 mos for Plaquenil eye monintoring     Chronic headache  on nortriptyline 10 mg two tabs (=20mg) po qHS     Peripheral neuropathy  on gabapentin 600 mg po qAM and 900 mg po qHS; Rx per neurologist Dr Parra, though pt no longer sees  -consider addition of Cymbalta if sxs refractory     GERD  on pantoprazole 40 mg po qD as heartburn has recurred; had EGD in Dec 2014 by Dr Encinas; also seen to have hiatal hernia     History of Depression  off fluoxetine 20 mg since Jan 2017; on no meds     Osteoporosis   DEXA in Sept 2018 shows T-score -1.5; Had DEXA scan in April 2017; was taking OTC calcium supplement; does not take bisphosphonates orally due to dyspepsia and hiatal hernia; undergoes Reclast IV annually in March at Dr Valadez's office; had one dose in Jan 2018, March 2019; did not go in March 2020; planned 5 years' duration; DEXA in Sept 2020 shows T-score -1.4 in left hip; DEXA in Oct 2022 shows T-score -1.8 in left femoral neck; CT abdomen in Sept 2020 shows mild chronic L3 superior endplate compression fracture.  -DEXA in Dec  2024 showed T-score -2.4 in left femoral neck; seeing endocrinologist Dr Cisneros in Feb 2025  -Reclast was on hold since 2020- Feb 2025 (had doses Jan 2018, March 2019)  -had Reclast 2/19./25      Internal hemorrhoids  S/p banding by Dr Hatch in 2010-11; has occasional stool incontinence; s/p banding with Dr Casey in 2018     Obesity  Body mass index is 35.29 kg/m².; weight 190 pounds; goal weight 145 pounds   advise exercise and weight loss  -discussed Zepbound, though Rx not covered by insurance  -seen at weight loss clinic;     Prediabetes  A1C 6.1% in Feb 2025; on metformin  mg po at bedtime; Rx per Dr Cisneros    Lumbar spondylosis  MRI lumbar spine in Jan 2020 shows  L4-L5:  Mild left greater than lateral recess and mild left greater than right neural foraminal stenosis.  Mild progression since July, 2015.    L5-S1:  Moderate spinal canal stenosis, predominantly related to dorsal and ventral epidural lipomatosis.  No significant interval change since July, 2015.    L1-L2:  Degenerative disc disease, which is new or progressive since July, 2015.  No significant neural compromise.  -s/p LESI in Feb 2020; pt followed by Dr Ambrosio  -MRI L-spine n Nov 2023 showed Degenerative disc and facet disease throughout the lumbar spine, most prominent at L4-L5, where there is moderate narrowing of the canal and bilateral neural foramen.  This has minimally progressed since 1/11/2020.   -s/p bilateral L5 ABRIL on 2/21/25 by Dr Ambrosio; has physical therapy weekly  -F/U Dr Ambrosio     Trigger finger   Of right 3rd finger; s/p injection in Sept 2020 by Dr Ambrosio     hypercholesterolemia   in Dec 2024     Vitamin D deficiency  VItamin D level 61 in Dec 2024; on Vitamin D 5000 units daily     Atypical nevus  To left trunk; pt referred to dermatologist Dr Bowens     Colon cancer screening  Had colonoscopy in Dec 2014 with GI Dr Encinas (next colonoscopy due in 10 years, or Dec 2024);  -pt referred to GI Dr Navarro for  colonoscopy; scheduled 5/16/25     Health Maintenance  S/p unilateral oophorectomy/ hysterectomy in 1992, so PAP not indicated;  Prevnar 20 given Dec 2022           MWE 1/13/25     Spent 30 minutes obtaining history, evaluating patient, discussing treatment options, diet, exercise, review of available labs and radiology reports, and completing documentation.                Orders This Visit:  No orders of the defined types were placed in this encounter.      Meds This Visit:  Requested Prescriptions     Signed Prescriptions Disp Refills    pantoprazole 40 MG Oral Tab EC 90 tablet 3     Sig: Take 1 tablet (40 mg total) by mouth every morning before breakfast.       Imaging & Referrals:  None     4/14/2025  Garrick Croft MD                 [1]   Past Medical History:   Asthma (HCC)    Breast cancer (HCC)    right breast    Dyslipidemia    Esophageal reflux    Extrinsic asthma, unspecified    Fibromyalgia    Hiatal hernia with GERD    High blood pressure    Insomnia    Osteopenia    Peripheral neuropathy    Raynaud's phenomenon    SLE (systemic lupus erythematosus) (HCC)    Dr Valadez    Stress fracture of the metatarsals    4th MT   [2]   Past Surgical History:  Procedure Laterality Date    Ankle fracture surgery Left     Cholecystectomy  1998    laparoscopic     Colonoscopy  12/12/2014    Electrocardiogram, complete      Scanned to media tab - DOS 04-    Hemorrhoidectomy      Hysterectomy  1992    laparoscopic (endometriosis)    Kaveh biopsy stereo nodule 1 site right (cpt=19081)  09/26/2018    Kaveh localization wire 1 site right (cpt=19281)  1992    Mastectomy right  11/01/2018    Other surgical history  11/01/2018    right breast mastecetomy    Radiation right      12/31/18-2/2019   [3]   Family History  Problem Relation Age of Onset    Prostate Cancer Father 57        d.62 metastatic    Heart Attack Father         MI    Cancer Mother 27        thyroid; lung @ 63 (smoker)    Heart Disease Mother     Cancer  Maternal Grandmother 45        cervical/uterine    Cancer Maternal Aunt 75        pancreatic ca; fmr smoker; d.75    Pancreatic Cancer Maternal Aunt 75    Cancer Sister 61        thyroid ca; sx only    Prostate Cancer Brother 49        surgery    Cancer Maternal Uncle 40        bladder ca, non-smoker    Cancer Maternal Grandfather 60        lymphoma    Cancer Paternal Grandfather         brain ca; dx on autopsy; d.55    Other (drug addict) Son     Cancer Maternal Aunt 62        lung ca; smoker; d.62    Cancer Maternal Aunt 72        lung ca; smoker; d.72    Cancer Maternal Cousin Female 51        thyroid ca    Breast Cancer Maternal Cousin Female 70    Prostate Cancer Paternal Uncle 75        seeds    Breast Cancer Self 63   [4]   Social History  Socioeconomic History    Marital status:     Number of children: 2   Tobacco Use    Smoking status: Never    Smokeless tobacco: Never   Vaping Use    Vaping status: Never Used   Substance and Sexual Activity    Alcohol use: No    Drug use: No   Other Topics Concern    Caffeine Concern Yes     Comment: Coffee 2 cups daily; Soda    Exercise Yes     Comment: walking   Social History Narrative    The patient does not use an assistive device..      The patient does not live in a home with stairs.     Social Drivers of Health     Food Insecurity: No Food Insecurity (1/13/2025)    NCSS - Food Insecurity     Worried About Running Out of Food in the Last Year: No     Ran Out of Food in the Last Year: No   Transportation Needs: No Transportation Needs (1/13/2025)    NCSS - Transportation     Lack of Transportation: No   Housing Stability: Not At Risk (1/13/2025)    NCSS - Housing/Utilities     Has Housing: Yes     Worried About Losing Housing: No     Unable to Get Utilities: No   [5]   Current Outpatient Medications   Medication Sig Dispense Refill    pantoprazole 40 MG Oral Tab EC Take 1 tablet (40 mg total) by mouth every morning before breakfast. 90 tablet 3    metFORMIN ER  500 MG Oral Tablet 24 Hr Take 1 tablet (500 mg total) by mouth 2 (two) times daily with meals. (Patient taking differently: Take 1 tablet (500 mg total) by mouth nightly.) 180 tablet 1    gabapentin 300 MG Oral Cap TAKE 2 CAPSULES EVERY MORNING AND TAKE 3 CAPSULES EVERY EVENING (Patient taking differently: Take 1 capsule (300 mg total) by mouth. TAKE 2 CAPSULES EVERY MORNING AND TAKE 3 CAPSULES EVERY EVENING) 450 capsule 3    furosemide 20 MG Oral Tab Take 1 tablet (20 mg total) by mouth every other day. 45 tablet 3    NORTRIPTYLINE 10 MG Oral Cap TAKE 2 CAPSULES EVERY NIGHT 180 capsule 3    FLUTICASONE PROPIONATE 50 MCG/ACT Nasal Suspension USE 2 SPRAYS NASALLY EVERY DAY 48 g 3    metoprolol succinate ER 50 MG Oral Tablet 24 Hr Take 1 tablet (50 mg total) by mouth daily. 90 tablet 3    Acetaminophen (TYLENOL EXTRA STRENGTH OR) Take by mouth. As needed      letrozole 2.5 MG Oral Tab Take 1 tablet (2.5 mg total) by mouth daily. 90 tablet 3    magnesium oxide 400 MG Oral Tab Take 1 tablet (400 mg total) by mouth daily. 90 tablet 3    Hydroxychloroquine Sulfate (PLAQUENIL) 200 MG Oral Tab Take 1 tablet (200 mg total) by mouth daily.      [employer] VIT D 5000 HIGH-POTENCY 5000 UNIT OR CAPS 1 CAPSULE DAILY     [6]   Allergies  Allergen Reactions    Lymphazurin [Isosulfan Blue] ANAPHYLAXIS    Elavil [Amitriptyline Hcl] OTHER (SEE COMMENTS)     Altered mental state    Flexeril [Cyclobenzaprine Hcl] OTHER (SEE COMMENTS)     Altered mental state    Nabumetone      Other reaction(s): Anaphylaxis    Relafen ANAPHYLAXIS    Cephalexin NAUSEA ONLY

## 2025-04-14 NOTE — PROGRESS NOTES
Patient: Gemini Mendoza (70 year old, female) Referring Provider:  Insurance:   Diagnosis: Annular tear of lumbar disc (M51.369)  Lumbar disc disease (M51.9)  Lumbar foraminal stenosis (M48.061)  Lumbar radiculopathy (M54.16)  Spondylolisthesis of lumbar region (M43.16) Fransisco KEITH [facility]   Date of Surgery: 2018 (partial mastectomy R) Next MD visit:  N/A   Precautions:  Cancer; Other (use comment); Drug Allergy; Fall Risk (Primary cancer of right breast with stage 2 sheila metastasis) No data recorded Referral Information:    Date of Evaluation: Req: 8, Auth: 8, Exp: 6/10/2025    03/10/25 POC Auth Visits:  8       Today's Date   4/14/2025    Subjective  Patient states feeling more stronger in her legs today, denies any pain. HEP compliance reported       Pain: 0/10     Objective  LE MMT: 4+/5, core MMT: 3+/5; glutes/hip extensors: 3+/5          Assessment  Patient displays improving LE strength and balance with functional ADLs. Improving weight bearing tolerance and gait mechanics.  Progressed to single leg bridges, bird dog and static lunges to improve static and dynamic balance ,core stabilisationand  lumbo pelvic mobility and rhythm. All exercises performed to fatigue without exacerbation of symtoms. Planning to progress with core strengtheing and spinal stabilisation exercises.    Goals (to be met in 8 visits)      Not Met Progress Toward Partially Met Met   Pt will improve transversus abdominis recruitment to perform proper isometric contraction without requiring verbal or tactile cuing to promote advancement of therex. [] [] [] []   Pt will demonstrate good understanding of proper posture and body mechanics to decrease pain and improve spinal safety. [] [] [] []   Pt will achieve >13 sec with (B) single leg balance to assist with dressing [] [] [] []   Pt will report improved symptom centralization and absence of radicular symptoms for 3 consecutive days to improve function with ADLs. [] [] [] []   Pt  will have decreased paraspinal mm tension to tolerate standing >40 minutes for work and home activities. [] [] [] []   Pt will demonstrate improved LE strength  to 5/5 to be able to squat to lift  >8 lbs floor with good body mechanics  with <2/10 pain. [] [] [] []   Pt will be independent and compliant with comprehensive HEP to maintain progress achieved in PT  Pt to report 75% decrease in radicular symptoms in (L) LE [] [] [] []                Plan  therapeutic exercises and activities to imporve core and hip  strength , lumbo pelvic rhythm,balance and coordination    Treatment Last 4 Visits  Treatment Day: 4       3/10/2025 3/31/2025 4/7/2025 4/14/2025   Spine Treatment   Therapeutic Exercise - 3 way hip stretch with strap:   - supine LTR  - supine SKTC   - seated on SB pelvic tilts/circles: 20 x 1 each  -seated on SB marches; 20 x 1   - supine LTR: 10 x 1   - supine SKTC -  - seated hip add hold: 20 x 1   Sit to stand with add squeeze: 10 x 2  Standing HR\" 20 x 1  Supine clams: 25 x 1  S/L clams : 20 x 1 each   -hooklying bridges: 10 x 2  - 3 way hip stretch with strap: 3 x 1 ; 10 sec   - 3 way stepping : 3 laps each         -seated SAQs: 25 x 1 each RTB   - supine LTR: 10 x 1  - supine SKTC -5 x 1 ; 10 sec each  - seated marching : 25 x 1 RTB  -hooklying bridges with hip abd: 10 x 2  Side lying bridges; 5 x 4 sets each  - 3 way hip stretch with strap: 3 x 1 ; 10 sec  - 3 way stepping : RTB 3 laps each  Mini squats: 10 x 2   Supine clams: 25 x 1 RTB  S/L clams : 20 x 1 each RTB   - seated Elliptical : 8 minutes #5     -hooklying single leg  bridges : 10 x 2  Side lying bridges; 5 x 4 sets each  Qudruped bird/dog: 5 x 2  Cat/cow; 10 x 2  Child pose stretch: 3 x 4 reps  - 3 way hip stretch with strap: 3 x 1 ; 10 sec  - 3 way stepping : GTB 2  laps each  Mini squats: 10 x 2; 4 lbs   Static lunges: 5 x 4  - seated Elliptical : 8 minutes #5    -       Manual Therapy  PROM (B) hips,passive  LE stretches     Therapeutic  Activity Posture awareness, instructed on wearing a compression sleeve or ace wrap in LE to control lymphedema. Pt educated on core activation and HEP.      Therapeutic Exercise Minutes 14 46 46 46   Therapeutic Activity Minutes 17      Total Time Of Timed Procedures 31 46 46 46   Total Time Of Service-Based Procedures 0 0 0 0   Total Treatment Time 31 46 46 46   HEP  core activation , Hip and Lumbar AROM , LE stretches, ankle pumps  Core activation, bridges, LE /LB stretches        HEP  Core activation, bridges, LE /LB stretches    Charges: TE: 3

## 2025-04-21 ENCOUNTER — OFFICE VISIT (OUTPATIENT)
Dept: PHYSICAL THERAPY | Age: 70
End: 2025-04-21
Attending: PHYSICAL MEDICINE & REHABILITATION
Payer: MEDICARE

## 2025-04-21 PROCEDURE — 97110 THERAPEUTIC EXERCISES: CPT

## 2025-04-21 NOTE — PROGRESS NOTES
Patient: Gemini Mendoza (70 year old, female) Referring Provider:  Insurance:   Diagnosis: Annular tear of lumbar disc (M51.369)  Lumbar disc disease (M51.9)  Lumbar foraminal stenosis (M48.061)  Lumbar radiculopathy (M54.16)  Spondylolisthesis of lumbar region (M43.16) Fransisco KEITH [facility]   Date of Surgery: 2018 (partial mastectomy R) Next MD visit:  N/A   Precautions:  Cancer; Other (use comment); Drug Allergy; Fall Risk (Primary cancer of right breast with stage 2 sheila metastasis) No data recorded Referral Information:    Date of Evaluation: Req: 8, Auth: 8, Exp: 6/10/2025    03/10/25 POC Auth Visits:  8       Today's Date   4/21/2025    Subjective  Patient states feeling more stronger in her legs today, denies any pain. HEP compliance reported       Pain: 0/10     Objective  LE MMT: 4+/5, core MMT: 3+/5; glutes/hip extensors: 3+/5          Assessment  Patient displays improving LE strength and balance with functional ADLs. Deficits in muscle strength and balance with (R) LE. Progressed with core stabilisation and  lumbo pelvic mobility and rhythm. All exercises performed to fatigue without exacerbation of symtoms. Planning to progress with core strengtheing and spinal stabilisation exercises.    Goals (to be met in 8 visits)      Not Met Progress Toward Partially Met Met   Pt will improve transversus abdominis recruitment to perform proper isometric contraction without requiring verbal or tactile cuing to promote advancement of therex. [] [] [] []   Pt will demonstrate good understanding of proper posture and body mechanics to decrease pain and improve spinal safety. [] [] [] []   Pt will achieve >13 sec with (B) single leg balance to assist with dressing [] [] [] []   Pt will report improved symptom centralization and absence of radicular symptoms for 3 consecutive days to improve function with ADLs. [] [] [] []   Pt will have decreased paraspinal mm tension to tolerate standing >40 minutes for work  and home activities. [] [] [] []   Pt will demonstrate improved LE strength  to 5/5 to be able to squat to lift  >8 lbs floor with good body mechanics  with <2/10 pain. [] [] [] []   Pt will be independent and compliant with comprehensive HEP to maintain progress achieved in PT  Pt to report 75% decrease in radicular symptoms in (L) LE [] [] [] []                    Plan  therapeutic exercises and activities to imporve core and hip  strength , lumbo pelvic rhythm,balance and coordination    Treatment Last 4 Visits  Treatment Day: 5       3/31/2025 4/7/2025 4/14/2025 4/21/2025   Spine Treatment   Therapeutic Exercise   - seated on SB pelvic tilts/circles: 20 x 1 each  -seated on SB marches; 20 x 1   - supine LTR: 10 x 1   - supine SKTC -  - seated hip add hold: 20 x 1   Sit to stand with add squeeze: 10 x 2  Standing HR\" 20 x 1  Supine clams: 25 x 1  S/L clams : 20 x 1 each   -hooklying bridges: 10 x 2  - 3 way hip stretch with strap: 3 x 1 ; 10 sec   - 3 way stepping : 3 laps each         -seated SAQs: 25 x 1 each RTB   - supine LTR: 10 x 1  - supine SKTC -5 x 1 ; 10 sec each  - seated marching : 25 x 1 RTB  -hooklying bridges with hip abd: 10 x 2  Side lying bridges; 5 x 4 sets each  - 3 way hip stretch with strap: 3 x 1 ; 10 sec  - 3 way stepping : RTB 3 laps each  Mini squats: 10 x 2   Supine clams: 25 x 1 RTB  S/L clams : 20 x 1 each RTB   - seated Elliptical : 8 minutes #5     -hooklying single leg  bridges : 10 x 2  Side lying bridges; 5 x 4 sets each  Qudruped bird/dog: 5 x 2  Cat/cow; 10 x 2  Child pose stretch: 3 x 4 reps  - 3 way hip stretch with strap: 3 x 1 ; 10 sec  - 3 way stepping : GTB 2  laps each  Mini squats: 10 x 2; 4 lbs   Static lunges: 5 x 4  - seated Elliptical : 8 minutes #5    -     -hooklying single leg  bridges : 10 x 2   -Dead bug: 4 x 8 reps  - Side lying bridges; 10 x  each   Qudruped bird/dog: 10 x 1   -Cat/cow; 10 x 1   -Child pose stretch: 10 x 1    - 4 way hip stretch with strap: 1  x  ; 30 sec each    - Static lunges: 5 x 4   - seated Elliptical : 8 minutes #5         Manual Therapy PROM (B) hips,passive  LE stretches      Therapeutic Exercise Minutes 46 46 46 46   Total Time Of Timed Procedures 46 46 46 46   Total Time Of Service-Based Procedures 0 0 0 0   Total Treatment Time 46 46 46 46   HEP core activation , Hip and Lumbar AROM , LE stretches, ankle pumps  Core activation, bridges, LE /LB stretches Core activation , single leg balance        HEP  Core activation , single leg balance    Charges: TE: 3

## 2025-04-28 ENCOUNTER — OFFICE VISIT (OUTPATIENT)
Dept: PHYSICAL THERAPY | Age: 70
End: 2025-04-28
Attending: PHYSICAL MEDICINE & REHABILITATION
Payer: MEDICARE

## 2025-04-28 PROCEDURE — 97110 THERAPEUTIC EXERCISES: CPT

## 2025-04-28 NOTE — PROGRESS NOTES
Patient: Gemini Mendoza (70 year old, female) Referring Provider:  Insurance:   Diagnosis: Annular tear of lumbar disc (M51.369)  Lumbar disc disease (M51.9)  Lumbar foraminal stenosis (M48.061)  Lumbar radiculopathy (M54.16)  Spondylolisthesis of lumbar region (M43.16) Fransisco KEITH [facility]   Date of Surgery: 2018 (partial mastectomy R) Next MD visit:  N/A   Precautions:  Cancer; Other (use comment); Drug Allergy; Fall Risk (Primary cancer of right breast with stage 2 sheila metastasis) No data recorded Referral Information:    Date of Evaluation: Req: 8, Auth: 8, Exp: 6/10/2025    03/10/25 POC Auth Visits:  8       Today's Date   4/28/2025    Subjective  Pt states discomfort in (L) hip with pivoting and with certain LE stretches performed at home ; Denies any pain today.       Pain: 0/10     Objective  LE MMT: 4+/5, core MMT: 3+/5; glutes/hip extensors: 3+/5            Assessment  Deficits in muscle strength and balance with (R) LE. Progressed with core stabilisation and  lumbo pelvic mobility and rhythm. All exercises performed to fatigue without exacerbation of symtoms. Planning to progress with core strengtheing and spinal stabilisation exercises.    Goals (to be met in 8 visits)      Not Met Progress Toward Partially Met Met   Pt will improve transversus abdominis recruitment to perform proper isometric contraction without requiring verbal or tactile cuing to promote advancement of therex. [] [] [] []   Pt will demonstrate good understanding of proper posture and body mechanics to decrease pain and improve spinal safety. [] [] [] []   Pt will achieve >13 sec with (B) single leg balance to assist with dressing [] [] [] []   Pt will report improved symptom centralization and absence of radicular symptoms for 3 consecutive days to improve function with ADLs. [] [] [] []   Pt will have decreased paraspinal mm tension to tolerate standing >40 minutes for work and home activities. [] [] [] []   Pt will  demonstrate improved LE strength  to 5/5 to be able to squat to lift  >8 lbs floor with good body mechanics  with <2/10 pain. [] [] [] []   Pt will be independent and compliant with comprehensive HEP to maintain progress achieved in PT  Pt to report 75% decrease in radicular symptoms in (L) LE [] [] [] []                        Plan  therapeutic exercises and activities to imporve core and hip  strength , lumbo pelvic rhythm,balance and coordination    Treatment Last 4 Visits  Treatment Day: 6 4/7/2025 4/14/2025 4/21/2025 4/28/2025   Spine Treatment   Therapeutic Exercise -seated SAQs: 25 x 1 each RTB   - supine LTR: 10 x 1  - supine SKTC -5 x 1 ; 10 sec each  - seated marching : 25 x 1 RTB  -hooklying bridges with hip abd: 10 x 2  Side lying bridges; 5 x 4 sets each  - 3 way hip stretch with strap: 3 x 1 ; 10 sec  - 3 way stepping : RTB 3 laps each  Mini squats: 10 x 2   Supine clams: 25 x 1 RTB  S/L clams : 20 x 1 each RTB   - seated Elliptical : 8 minutes #5     -hooklying single leg  bridges : 10 x 2  Side lying bridges; 5 x 4 sets each  Qudruped bird/dog: 5 x 2  Cat/cow; 10 x 2  Child pose stretch: 3 x 4 reps  - 3 way hip stretch with strap: 3 x 1 ; 10 sec  - 3 way stepping : GTB 2  laps each  Mini squats: 10 x 2; 4 lbs   Static lunges: 5 x 4  - seated Elliptical : 8 minutes #5    -     -hooklying single leg  bridges : 10 x 2   -Dead bug: 4 x 8 reps  - Side lying bridges; 10 x  each   Qudruped bird/dog: 10 x 1   -Cat/cow; 10 x 1   -Child pose stretch: 10 x 1    - 4 way hip stretch with strap: 1 x  ; 30 sec each    - Static lunges: 5 x 4   - seated Elliptical : 8 minutes #5       -hooklying single leg  bridges : 10 x 2   -Dead bug: 10 x 2  - LTR: 10 x    Qudruped bird/dog: 10 x 1   -Child pose stretch: 10 x 1    - 4 way hip stretch with strap: 1 x  ; 30 sec each    - Static lunges: 5 x 4   - seated Elliptical : 8 minutes #5     - 3 way stepping : 2 laps x each YTB  -Mini squats with knee raises; 10 x   -  seated figure 4 stretch: 3 x 10 sec     Therapeutic Exercise Minutes 46 46 46 46   Total Time Of Timed Procedures 46 46 46 46   Total Time Of Service-Based Procedures 0 0 0 0   Total Treatment Time 46 46 46 46   HEP  Core activation, bridges, LE /LB stretches Core activation , single leg balance LB and LE strengthening and stretches        HEP  LB and LE strengthening and stretches    ChargesTE: 3

## 2025-05-05 DIAGNOSIS — E66.9 OBESITY (BMI 30-39.9): Primary | ICD-10-CM

## 2025-05-05 RX ORDER — TIRZEPATIDE 2.5 MG/.5ML
2.5 INJECTION, SOLUTION SUBCUTANEOUS WEEKLY
Qty: 2 ML | Refills: 3 | Status: SHIPPED | OUTPATIENT
Start: 2025-05-05

## 2025-05-12 ENCOUNTER — TELEPHONE (OUTPATIENT)
Facility: CLINIC | Age: 70
End: 2025-05-12

## 2025-05-15 ENCOUNTER — TELEPHONE (OUTPATIENT)
Facility: CLINIC | Age: 70
End: 2025-05-15

## 2025-05-15 NOTE — TELEPHONE ENCOUNTER
Patient calling regards procedure states would need to reschedule due to a slip and bruised ribs. Please call.

## 2025-05-29 RX ORDER — METOPROLOL SUCCINATE 50 MG/1
50 TABLET, EXTENDED RELEASE ORAL DAILY
Qty: 90 TABLET | Refills: 3 | Status: SHIPPED | OUTPATIENT
Start: 2025-05-29

## 2025-06-03 ENCOUNTER — OFFICE VISIT (OUTPATIENT)
Dept: PHYSICAL MEDICINE AND REHAB | Facility: CLINIC | Age: 70
End: 2025-06-03
Payer: MEDICARE

## 2025-06-03 VITALS — HEIGHT: 60.05 IN | WEIGHT: 181 LBS | BODY MASS INDEX: 35.07 KG/M2

## 2025-06-03 DIAGNOSIS — M48.061 LUMBAR FORAMINAL STENOSIS: ICD-10-CM

## 2025-06-03 DIAGNOSIS — M51.9 LUMBAR DISC DISEASE: ICD-10-CM

## 2025-06-03 DIAGNOSIS — I89.0 LYMPHEDEMA OF RIGHT ARM: ICD-10-CM

## 2025-06-03 DIAGNOSIS — M25.552 LEFT HIP PAIN: ICD-10-CM

## 2025-06-03 DIAGNOSIS — M43.16 SPONDYLOLISTHESIS OF LUMBAR REGION: ICD-10-CM

## 2025-06-03 DIAGNOSIS — C50.911 PRIMARY CANCER OF RIGHT BREAST WITH STAGE 2 NODAL METASTASIS PER AMERICAN JOINT COMMITTEE ON CANCER 7TH EDITION (N2) (HCC): ICD-10-CM

## 2025-06-03 DIAGNOSIS — M65.331 TRIGGER MIDDLE FINGER OF RIGHT HAND: ICD-10-CM

## 2025-06-03 DIAGNOSIS — M54.16 LUMBAR RADICULOPATHY: Primary | ICD-10-CM

## 2025-06-03 DIAGNOSIS — M65.332 ACQUIRED TRIGGER FINGER OF LEFT MIDDLE FINGER: ICD-10-CM

## 2025-06-03 DIAGNOSIS — C77.9 PRIMARY CANCER OF RIGHT BREAST WITH STAGE 2 NODAL METASTASIS PER AMERICAN JOINT COMMITTEE ON CANCER 7TH EDITION (N2) (HCC): ICD-10-CM

## 2025-06-03 PROCEDURE — 3008F BODY MASS INDEX DOCD: CPT | Performed by: PHYSICAL MEDICINE & REHABILITATION

## 2025-06-03 PROCEDURE — 99214 OFFICE O/P EST MOD 30 MIN: CPT | Performed by: PHYSICAL MEDICINE & REHABILITATION

## 2025-06-03 PROCEDURE — 1159F MED LIST DOCD IN RCRD: CPT | Performed by: PHYSICAL MEDICINE & REHABILITATION

## 2025-06-03 PROCEDURE — 1160F RVW MEDS BY RX/DR IN RCRD: CPT | Performed by: PHYSICAL MEDICINE & REHABILITATION

## 2025-06-03 NOTE — PROGRESS NOTES
Low Back Pain [facility]    Chief Complaint:   Chief Complaint   Patient presents with    Follow - Up     LOV 2/17/25 pt is here for a follow up. Was given a Bilateral L5 transforaminal epidural steroid injections 2/21 and received relief. Reports numbness on the outer part of her left leg. Takes tylenol to ease pain.Completed physical therapy. Pain 3/10     Nursing note reviewed and verified.  She was last seen in the office on 2/17/2025.      History of Present Illness  Gemini Mendoza is a 70 year old female who presents with left leg weakness and groin pain.    She has experienced persistent weakness in her left leg since receiving an injection intended to stimulate the nerve. This weakness has led to a tendency to trip and has resulted in three falls since the beginning of the year. The left leg feels weaker, almost giving out when she stands up and takes a step, particularly when turning left and stepping with her left foot.    She describes sharp pain in her left groin area extending to the outside of her hip, exacerbated by hip flexion and external rotation. The pain is rated as a seven or higher during these activities but is absent when sitting. She also reports numbness on the side of her left thigh but no tingling.    She has undergone physical therapy with [facility], which provided some exercise ideas but did not lead to significant improvement. Bilateral L5 transforaminal epidural steroid injections on 2/21/2025 which decreased her pain by about 80% but did not improve the left leg weakness or groin pain. An injection for bursitis in the left bursa sac provided minimal relief.    Description of the Pain  The pain is located in the left low back.    The pain radiates to the left groin and left lateral hip..  The pain at its best is 0/10. The pain at its worst is 7/10. The pain is currently  0/10.  The pain is described as a(n) sharp sensation.    The pain is worse with external and stepping and hip flexion.     The pain is better sitting.  There is numbness in the left lateral thigh.  There is no tingling in the legs.  There is weakness in the left leg.     Past Medical History   Past Medical History[1]    Past Surgical History   Past Surgical History[2]    Family History   Family History[3]    Social History   Social History     Socioeconomic History    Marital status:      Spouse name: Not on file    Number of children: 2    Years of education: Not on file    Highest education level: Not on file   Occupational History    Not on file   Tobacco Use    Smoking status: Never    Smokeless tobacco: Never   Vaping Use    Vaping status: Never Used   Substance and Sexual Activity    Alcohol use: No    Drug use: No    Sexual activity: Not on file   Other Topics Concern     Service Not Asked    Blood Transfusions Not Asked    Caffeine Concern Yes     Comment: Coffee 2 cups daily; Soda    Occupational Exposure Not Asked    Hobby Hazards Not Asked    Sleep Concern Not Asked    Stress Concern Not Asked    Weight Concern Not Asked    Special Diet Not Asked    Back Care Not Asked    Exercise Yes     Comment: walking    Bike Helmet Not Asked    Seat Belt Not Asked    Self-Exams Not Asked   Social History Narrative    The patient does not use an assistive device..      The patient does not live in a home with stairs.     Social Drivers of Health     Food Insecurity: No Food Insecurity (1/13/2025)    NCSS - Food Insecurity     Worried About Running Out of Food in the Last Year: No     Ran Out of Food in the Last Year: No   Transportation Needs: No Transportation Needs (1/13/2025)    NCSS - Transportation     Lack of Transportation: No   Stress: Not on file   Housing Stability: Not At Risk (1/13/2025)    NCSS - Housing/Utilities     Has Housing: Yes     Worried About Losing Housing: No     Unable to Get Utilities: No       PE:  The patient does appear in her stated age in no distress.  The patient is well  groomed.    Psychiatric:  The patient is alert and oriented x 3.  The patient has a normal affect and mood.      Respiratory:  No acute respiratory distress. Patient does not have a cough.    HEENT:  Extraocular muscles are intact. There is no kern icterus. Pupils are equal, round, and reactive to light. No redness or discharge bilaterally.    Skin:  There are no rashes or lesions.    Vitals:  There were no vitals filed for this visit.    Gait:    Gait: Normal gait   Sit to Stand: no difficulty      Vascular lower extremity:   Dorsalis pedis pulse-RIGHT 2+   Dorsalis pedis pulse-LEFT 2+   Tibialis posterior pulse-RIGHT 2+   Tibialis posterior pulse-LEFT 2+     Neurological Lower Extremity:    Light Touch Sensation: Intact in bilateral LE except:  Decreased in the  lateral Left thigh   LE Muscle Strength: All LE strength measurements 5/5 except:  Hamstring RIGHT:   4+/5  Hamstring LEFT:   4+/5   RIGHT plantar reflexes: downward response   LEFT plantar reflexes: downward response   Reflexes: 2+ in bilateral lower extremities except:  Right Achilles tendon which are absent     Neural Tension Tests Lumbar Spine:  Supine straight leg raise-RIGHT Negative for pain   Supine straight leg raise-LEFT Negative for pain     Hip: Hips are stable.    RIGHT hip ROM moderately limited with internal rotation   LEFT hip ROM moderately limited with internal rotation   Right hip flexion Negative pain   LEFT hip flexion Negative pain   RIGHT hip DARIO test Negative for pain   LEFT hip DARIO test Positive for left lateral hip pain   RIGHT hip internal rotation Negative for pain   LEFT hip internal rotation Positive for left lateral hip pain     Assessment  1. left > right L5-S1 radiculopathy    2. L4-5 right mild/left mild-moderate, L3-4 left mild foraminal stenosis    3. L1-2 right mild, L3-4 mild diffuse & left mild-mod far lateral, L4-5 mild-mod central & bilateral mod far lateral, L5-S1 left mild-mod foraminal & mild central bulging  discs    4. Primary cancer of right breast with stage 2 sheila metastasis per American Joint Committee on Cancer 7th edition (N2) (HCC)    5. Trigger middle finger of right hand    6. L3-4 grade 1, L4-5 grade 1 spondylolisthesis    7. Acquired trigger finger of left middle finger    8. Lymphedema of right arm         Plan  Assessment & Plan  Lumbar radiculopathy  Chronic lumbar radiculopathy with persistent left leg weakness and numbness. Epidural steroid injections reduced pain by 80% but did not improve weakness or groin pain. Differential includes lumbar radiculopathy and potential hip pathology.    Left hip pain  Chronic left hip pain with groin pain and clicking, exacerbated by hip flexion and external rotation. Differential includes hip pathology such as cartilage degeneration or bursitis. Previous x-ray normal, but symptoms suggest hip involvement.  - Order x-ray of the left hip to assess for changes.  - Consider hip injection if x-ray suggests hip pathology and after endocrinologist consultation.    Osteoporosis  Osteoporosis with decreased bone density. Reclast treatment administered. Awaiting follow-up DEXA scan. Endocrinologist consultation needed for hip injection safety.  - Consult endocrinologist regarding hip steroid injection safety post-Reclast treatment.    Return in about 3 months (around 9/3/2025) for assessment after injection.     Patient should call prior to next visit if new or worsening symptoms.     The patient understands and agrees with the stated plan.  Fransisco Ambrosio MD  6/3/2025           [1]   Past Medical History:   Allergic rhinitis    Arthritis    Asthma (HCC)    Breast cancer (HCC)    right breast    Cancer (HCC)    Diverticular disease    Dyslipidemia    Esophageal reflux    Extrinsic asthma, unspecified    Fibromyalgia    Frequent urination    Frequent UTI    Headache disorder    Hemorrhoids    Hiatal hernia with GERD    High blood pressure    Insomnia    Muscle weakness     Numbness    Obesity    Osteoarthritis    Osteopenia    Pain    Peripheral neuropathy    Raynaud's phenomenon    SLE (systemic lupus erythematosus) (HCC)    Dr Valadez    Stress fracture of the metatarsals    4th MT    UTI (urinary tract infection)    Visual impairment   [2]   Past Surgical History:  Procedure Laterality Date    Ankle fracture surgery Left     Cholecystectomy      laparoscopic     Colonoscopy  2014    D & c      Electrocardiogram, complete      Scanned to media tab - DOS 2013    Hemorrhoidectomy      Hemorrhoidectomy,int/ext,simple      Hysterectomy      laparoscopic (endometriosis)    Laparoscopic cholecystectomy      Kaveh biopsy stereo nodule 1 site right (cpt=19081)  2018    Kaveh localization wire 1 site right (cpt=19281)  1992    Mastectomy right  2018    Needle biopsy right  2018      1981, 1984    Oophorectomy      Other surgical history  2018    right breast mastecetomy    Radiation right          Total abdom hysterectomy     [3]   Family History  Problem Relation Age of Onset    Prostate Cancer Father 57        Passed Away    Heart Attack Father         MI    Cancer Father         Prostate cancer    Cancer Mother 27        thyroid; lung @ 63 (smoker)    Heart Disease Mother     Cancer Maternal Grandmother 45        cervical/uterine    Cancer Maternal Aunt 75        pancreatic ca; fmr smoker; d.75    Pancreatic Cancer Maternal Aunt 75    Cancer Sister 61        Thyroid Cancer    Prostate Cancer Brother 49        Thyroid Cancer    Cancer Maternal Uncle 40        bladder ca, non-smoker    Cancer Maternal Grandfather 60        lymphoma    Cancer Paternal Grandfather         Brain Cancer    Other (drug addict) Son     Cancer Maternal Aunt 62        Lung Cancer    Cancer Maternal Aunt 72        Lung Cancer    Cancer Maternal Cousin Female 51        thyroid ca    Breast Cancer Maternal Cousin Female 70    Prostate Cancer Paternal Uncle  75        Bladder Cancer    Breast Cancer Self 61    Cancer Brother         Prostate Cancer, Thyroid Cancer    Prostate Cancer Brother

## 2025-06-04 ENCOUNTER — HOSPITAL ENCOUNTER (OUTPATIENT)
Dept: GENERAL RADIOLOGY | Age: 70
Discharge: HOME | End: 2025-06-04
Attending: PHYSICAL MEDICINE & REHABILITATION
Payer: MEDICARE

## 2025-06-04 ENCOUNTER — TELEPHONE (OUTPATIENT)
Dept: PHYSICAL MEDICINE AND REHAB | Facility: CLINIC | Age: 70
End: 2025-06-04

## 2025-06-04 DIAGNOSIS — M25.552 LEFT HIP PAIN: ICD-10-CM

## 2025-06-04 DIAGNOSIS — M16.12 OSTEOARTHRITIS OF LEFT HIP, UNSPECIFIED OSTEOARTHRITIS TYPE: ICD-10-CM

## 2025-06-04 DIAGNOSIS — M25.552 LEFT HIP PAIN: Primary | ICD-10-CM

## 2025-06-04 PROCEDURE — 73502 X-RAY EXAM HIP UNI 2-3 VIEWS: CPT | Performed by: PHYSICAL MEDICINE & REHABILITATION

## 2025-06-04 NOTE — TELEPHONE ENCOUNTER
Pt will be having her xray done today and is calling to schedule a follow up with Dr. Ambrosio to review the results. Pt requesting to speak with nurse to schedule an appt sooner than in September. Please advise, thank you.

## 2025-06-06 PROBLEM — M16.0 PRIMARY OSTEOARTHRITIS OF BOTH HIPS: Status: ACTIVE | Noted: 2025-06-06

## 2025-06-07 NOTE — TELEPHONE ENCOUNTER
She has mild OA of both hips.  I will see what the endocrinologist says about her having hip steroid injections.

## 2025-06-09 RX ORDER — LETROZOLE 2.5 MG/1
2.5 TABLET, FILM COATED ORAL DAILY
Qty: 90 TABLET | Refills: 3 | Status: SHIPPED | OUTPATIENT
Start: 2025-06-09

## 2025-06-11 RX ORDER — FLUTICASONE PROPIONATE 50 MCG
2 SPRAY, SUSPENSION (ML) NASAL DAILY
Qty: 48 G | Refills: 3 | OUTPATIENT
Start: 2025-06-11

## 2025-06-11 NOTE — TELEPHONE ENCOUNTER
Message sent to patient via [employer].    Message also sent to  - Endocrinology for feedback/clearance regarding hip steroid injection.    Awaiting response from endocrinology.  Injection order also pended. Will forward on to  for approval once endocrinology clearance is received.

## 2025-06-11 NOTE — TELEPHONE ENCOUNTER
Current refill request refused due to refill is either a duplicate request or has active refills at the pharmacy.  Check previous templates.    Requested Prescriptions     Refused Prescriptions Disp Refills    FLUTICASONE PROPIONATE 50 MCG/ACT Nasal Suspension [Pharmacy Med Name: Fluticasone Propionate Nasal Suspension 50 MCG/ACT] 48 g 3     Sig: USE 2 SPRAYS NASALLY EVERY DAY     Refused By: ANN BOYLE     Reason for Refusal: Request already responded to by other means (e.g. phone or fax)

## 2025-06-11 NOTE — TELEPHONE ENCOUNTER
Per CMS Guidelines -no authorization is required for left hip injection under ultrasound guidance.  CPT codes: 93574,      Status: Authorization is not required based on medical necessity however is not a guarantee of payment and may be subject to review once claim is submitted.

## 2025-06-11 NOTE — TELEPHONE ENCOUNTER
Per Dr.Couri hoffman to place order for left hip injection under ultrasound guidance.    Injection order placed.

## 2025-06-11 NOTE — TELEPHONE ENCOUNTER
Per  on 6/4/25 in [employer] message to patient:  \"Good Morning,  Yes I put in the order for HgA1c level.  So you can go anytime to [facility].       It should be fine to receive the steroid injection if necessary.  You have received Reclast so that should help protect your bone.  .\"    Message sent to  to confirm injection order.  Awaiting feedback.

## 2025-06-16 ENCOUNTER — OFFICE VISIT (OUTPATIENT)
Dept: PHYSICAL MEDICINE AND REHAB | Facility: CLINIC | Age: 70
End: 2025-06-16
Payer: MEDICARE

## 2025-06-16 DIAGNOSIS — M25.552 LEFT HIP PAIN: ICD-10-CM

## 2025-06-16 DIAGNOSIS — M16.12 OSTEOARTHRITIS OF LEFT HIP, UNSPECIFIED OSTEOARTHRITIS TYPE: Primary | ICD-10-CM

## 2025-06-16 PROCEDURE — 1159F MED LIST DOCD IN RCRD: CPT | Performed by: PHYSICAL MEDICINE & REHABILITATION

## 2025-06-16 PROCEDURE — 1160F RVW MEDS BY RX/DR IN RCRD: CPT | Performed by: PHYSICAL MEDICINE & REHABILITATION

## 2025-06-16 PROCEDURE — 20611 DRAIN/INJ JOINT/BURSA W/US: CPT | Performed by: PHYSICAL MEDICINE & REHABILITATION

## 2025-06-16 RX ORDER — LIDOCAINE HYDROCHLORIDE 10 MG/ML
6.5 INJECTION, SOLUTION INFILTRATION; PERINEURAL ONCE
Status: COMPLETED | OUTPATIENT
Start: 2025-06-16 | End: 2025-06-16

## 2025-06-16 RX ORDER — TRIAMCINOLONE ACETONIDE 40 MG/ML
60 INJECTION, SUSPENSION INTRA-ARTICULAR; INTRAMUSCULAR ONCE
Status: COMPLETED | OUTPATIENT
Start: 2025-06-16 | End: 2025-06-16

## 2025-06-17 ENCOUNTER — OFFICE VISIT (OUTPATIENT)
Age: 70
End: 2025-06-17
Attending: INTERNAL MEDICINE
Payer: MEDICARE

## 2025-06-17 VITALS
TEMPERATURE: 98 F | DIASTOLIC BLOOD PRESSURE: 67 MMHG | SYSTOLIC BLOOD PRESSURE: 119 MMHG | BODY MASS INDEX: 35 KG/M2 | RESPIRATION RATE: 16 BRPM | WEIGHT: 182 LBS | OXYGEN SATURATION: 97 % | HEART RATE: 76 BPM

## 2025-06-17 DIAGNOSIS — Z78.0 OSTEOPENIA AFTER MENOPAUSE: ICD-10-CM

## 2025-06-17 DIAGNOSIS — L90.5 AXILLARY WEB SYNDROME: ICD-10-CM

## 2025-06-17 DIAGNOSIS — Z12.31 ENCOUNTER FOR SCREENING MAMMOGRAM FOR BREAST CANCER: ICD-10-CM

## 2025-06-17 DIAGNOSIS — L76.82 AXILLARY WEB SYNDROME: ICD-10-CM

## 2025-06-17 DIAGNOSIS — Z17.0 MALIGNANT NEOPLASM OF RIGHT BREAST IN FEMALE, ESTROGEN RECEPTOR POSITIVE, UNSPECIFIED SITE OF BREAST (HCC): Primary | ICD-10-CM

## 2025-06-17 DIAGNOSIS — C50.911 MALIGNANT NEOPLASM OF RIGHT BREAST IN FEMALE, ESTROGEN RECEPTOR POSITIVE, UNSPECIFIED SITE OF BREAST (HCC): Primary | ICD-10-CM

## 2025-06-17 DIAGNOSIS — Z79.811 ENCOUNTER FOR MONITORING AROMATASE INHIBITOR THERAPY: ICD-10-CM

## 2025-06-17 DIAGNOSIS — Z08 ENCOUNTER FOR FOLLOW-UP SURVEILLANCE OF BREAST CANCER: ICD-10-CM

## 2025-06-17 DIAGNOSIS — Z90.11 H/O RIGHT MASTECTOMY: ICD-10-CM

## 2025-06-17 DIAGNOSIS — Z51.81 ENCOUNTER FOR MONITORING AROMATASE INHIBITOR THERAPY: ICD-10-CM

## 2025-06-17 DIAGNOSIS — I89.0 LYMPHEDEMA OF RIGHT ARM: ICD-10-CM

## 2025-06-17 DIAGNOSIS — M85.80 OSTEOPENIA AFTER MENOPAUSE: ICD-10-CM

## 2025-06-17 DIAGNOSIS — Z85.3 ENCOUNTER FOR FOLLOW-UP SURVEILLANCE OF BREAST CANCER: ICD-10-CM

## 2025-06-17 NOTE — PROGRESS NOTES
HPI   Gemini Mendoza is a 70 year old female here for follow up of Malignant neoplasm of right breast in female, estrogen receptor positive, unspecified site of breast (HCC)    Encounter for monitoring aromatase inhibitor therapy    Encounter for follow-up surveillance of breast cancer    H/O right mastectomy    Encounter for screening mammogram for breast cancer    Osteopenia after menopause    Lymphedema of right arm.    Compliance with SBE: Yes. Changes on SBE: No.  R chest tenderness to palpation since last visit and some swelling.      Compliance with letrozole:  compliance all of the time    Side effects from letrozole: No hot flashes states have resolved with magnesium, No arthralgias or myalgias.  Does have trigger fingers and is following with Dr. Ambrosio and resolves with injections.     Weight down with zepbound since February, going to weight loss clinic.      States once doing outside work with nice weather, has been having issues with R arm ROM, pain in the arm with burning and sometimes hard time with deep inspiration.  Using machine at home and states that also had wrapping of the R arm.  Having more swelling, can't use the sleeve and thinks needs therapy again.      ECOG PS: 0      Review of Systems:     Review of Systems   Constitutional:  Negative for appetite change, fatigue and unexpected weight change.   Respiratory:  Positive for cough (from GERD). Negative for shortness of breath.         If takes a very deep breath the ribs hurt R>L.   Cardiovascular:  Negative for chest pain.   Gastrointestinal:  Negative for abdominal pain.        Epigastric pain, has GERD and takes pantoprazole, still has symptoms   Endocrine: Negative for hot flashes.   Genitourinary:  Positive for frequency (when takes water pills).    Musculoskeletal:  Positive for back pain (LBP, follows with Dr. Ambrosio). Negative for arthralgias and neck pain.   Neurological:  Positive for extremity weakness (L leg weak, follows  with PMR and had injection). Negative for dizziness and headaches.        Having falls from weak left leg.   Hematological:  Negative for adenopathy.   Psychiatric/Behavioral:  Positive for sleep disturbance.          Current Outpatient Medications   Medication Sig Dispense Refill    letrozole 2.5 MG Oral Tab Take 1 tablet (2.5 mg total) by mouth daily. 90 tablet 3    METOPROLOL SUCCINATE ER 50 MG Oral Tablet 24 Hr TAKE 1 TABLET EVERY DAY 90 tablet 3    Tirzepatide-Weight Management (ZEPBOUND) 2.5 MG/0.5ML Subcutaneous Solution Inject 2.5 mg into the skin once a week. 2 mL 3    pantoprazole 40 MG Oral Tab EC Take 1 tablet (40 mg total) by mouth every morning before breakfast. 90 tablet 3    gabapentin 300 MG Oral Cap TAKE 2 CAPSULES EVERY MORNING AND TAKE 3 CAPSULES EVERY EVENING (Patient taking differently: Take 1 capsule (300 mg total) by mouth. TAKE 2 CAPSULES EVERY MORNING AND TAKE 3 CAPSULES EVERY EVENING) 450 capsule 3    furosemide 20 MG Oral Tab Take 1 tablet (20 mg total) by mouth every other day. 45 tablet 3    NORTRIPTYLINE 10 MG Oral Cap TAKE 2 CAPSULES EVERY NIGHT 180 capsule 3    FLUTICASONE PROPIONATE 50 MCG/ACT Nasal Suspension USE 2 SPRAYS NASALLY EVERY DAY 48 g 3    Acetaminophen (TYLENOL EXTRA STRENGTH OR) Take by mouth. As needed      magnesium oxide 400 MG Oral Tab Take 1 tablet (400 mg total) by mouth daily. 90 tablet 3    Hydroxychloroquine Sulfate (PLAQUENIL) 200 MG Oral Tab Take 1 tablet (200 mg total) by mouth in the morning.      [employer] VIT D 5000 HIGH-POTENCY 5000 UNIT OR CAPS Take by mouth.       Allergies:   Allergies[1]    Past Medical History:    Allergic rhinitis    Arthritis    Asthma (HCC)    Breast cancer (HCC)    right breast    Cancer (HCC)    Diverticular disease    Dyslipidemia    Esophageal reflux    Extrinsic asthma, unspecified    Fibromyalgia    Frequent urination    Frequent UTI    Headache disorder    Hemorrhoids    Hiatal hernia with GERD    High blood pressure    Insomnia     Muscle weakness    Numbness    Obesity    Osteoarthritis    Osteopenia    Pain    Peripheral neuropathy    Raynaud's phenomenon    SLE (systemic lupus erythematosus) ([facility])    Dr Valadez    Stress fracture of the metatarsals    4th MT    UTI (urinary tract infection)    Visual impairment     Past Surgical History:   Procedure Laterality Date    Ankle fracture surgery Left     Cholecystectomy      laparoscopic     Colonoscopy  2014    D & c      Electrocardiogram, complete      Scanned to media tab - DOS 2013    Hemorrhoidectomy      Hemorrhoidectomy,int/ext,simple      Hysterectomy      laparoscopic (endometriosis)    Laparoscopic cholecystectomy      Kaveh biopsy stereo nodule 1 site right (cpt=19081)  2018    Kaveh localization wire 1 site right (cpt=19281)  1992    Mastectomy right  2018    Needle biopsy right  2018      1981, 1984    Oophorectomy      Other surgical history  2018    right breast mastecetomy    Radiation right          Total abdom hysterectomy       Social History     Socioeconomic History    Marital status:     Number of children: 2   Tobacco Use    Smoking status: Never    Smokeless tobacco: Never   Vaping Use    Vaping status: Never Used   Substance and Sexual Activity    Alcohol use: No    Drug use: No   Other Topics Concern    Caffeine Concern Yes     Comment: Coffee 2 cups daily; Soda    Exercise Yes     Comment: walking   Social History Narrative    The patient does not use an assistive device..      The patient does not live in a home with stairs.     Social Drivers of Health     Food Insecurity: No Food Insecurity (2025)    NCSS - Food Insecurity     Worried About Running Out of Food in the Last Year: No     Ran Out of Food in the Last Year: No   Transportation Needs: No Transportation Needs (2025)    NCSS - Transportation     Lack of Transportation: No   Housing Stability: Not At Risk (2025)    NCSS -  Housing/Utilities     Has Housing: Yes     Worried About Losing Housing: No     Unable to Get Utilities: No       Family History   Problem Relation Age of Onset    Prostate Cancer Father 57        Passed Away    Heart Attack Father         MI    Cancer Father         Prostate cancer    Cancer Mother 27        thyroid; lung @ 63 (smoker)    Heart Disease Mother     Cancer Maternal Grandmother 45        cervical/uterine    Cancer Maternal Aunt 75        pancreatic ca; fmr smoker; d.75    Pancreatic Cancer Maternal Aunt 75    Cancer Sister 61        Thyroid Cancer    Prostate Cancer Brother 49        Thyroid Cancer    Cancer Maternal Uncle 40        bladder ca, non-smoker    Cancer Maternal Grandfather 60        lymphoma    Cancer Paternal Grandfather         Brain Cancer    Other (drug addict) Son     Cancer Maternal Aunt 62        Lung Cancer    Cancer Maternal Aunt 72        Lung Cancer    Cancer Maternal Cousin Female 51        thyroid ca    Breast Cancer Maternal Cousin Female 70    Prostate Cancer Paternal Uncle 75        Bladder Cancer    Breast Cancer Self 61    Cancer Brother         Prostate Cancer, Thyroid Cancer    Prostate Cancer Brother          PHYSICAL EXAM:    /67 (BP Location: Left arm, Patient Position: Sitting, Cuff Size: adult)   Pulse 76   Temp 97.5 °F (36.4 °C)   Resp 16   Wt 82.6 kg (182 lb)   SpO2 97%   BMI 35.49 kg/m²   Wt Readings from Last 6 Encounters:   06/17/25 82.6 kg (182 lb)   06/03/25 82.1 kg (181 lb)   04/14/25 82.1 kg (181 lb)   04/10/25 82.9 kg (182 lb 12.8 oz)   02/24/25 85.1 kg (187 lb 9.6 oz)   02/19/25 86.6 kg (191 lb)     Physical Exam  General: Patient is alert, not in acute distress.  HEENT: EOMs intact. PERRL.   Neck: No JVD. No palpable lymphadenopathy. Neck is supple.  Chest: Clear to auscultation.  Breasts:  R chest with mastectomy and RT changes.  Cording on the R axillary site.  L breast no masses.    Heart: Regular rate and rhythm.   Abdomen: Soft, non  tender with good bowel sounds.  Extremities: No edema. R arm with lymphedema.   Neurological: Grossly intact.   Lymphatics: There is no palpable lymphadenopathy throughout in the cervical, supraclavicular, axillary, or inguinal regions.  Psych/Depression: nl        ASSESSMENT/PLAN:     1. Malignant neoplasm of right breast in female, estrogen receptor positive, unspecified site of breast (HCC)    2. Encounter for monitoring aromatase inhibitor therapy    3. Encounter for follow-up surveillance of breast cancer    4. H/O right mastectomy    5. Encounter for screening mammogram for breast cancer    6. Osteopenia after menopause    7. Lymphedema of right arm        Patient is a post menopausal female status post right mastectomy and axillary lymph node dissection 11/1/2018 for hormone receptor positive HER-2 negative stage IIB (PT2N1) grade 2 infiltrating ductal carcinoma.  Oncotype recurrence score was 11.  One sentinel lymph node was positive.  On axillary lymph node dissection 0 of 16 lymph nodes were involved. She has underlying history of SLE She has history of osteopenia/osteoporosis.  She is on methotrexate prednisone and Plaquenil chronically    -Based on her Oncotype score do not recommend adjuvant chemotherapy  -She completed adjuvant radiotherapy in February 2019  - Patient has been on adjuvant endocrine therapy with letrozole since February 2019.  Discussed with the patient she has completed 5 years of treatment in February 2024.  She is currently completing 6.5 years of therapy.  Discussed with the patient the current guidelines do recommend consideration for up to 8 to 10 years of therapy.  She did have a low risk of recurrence score for Oncotype.      BCI as follows:     The Breast Cancer Index score as follows:     Predictive Result:  Am I likely to benefit from extended endocrine therapy:  Yes.       Prognostic results:  What is my risk of late distant recurrence (5-10 yrs)     With 5 total years of  adjuvant endocrine therapy:  13.6%     With 10 years of adjuvant endocrine therapy:  4.5-5.7%  (Trials, those identified by breast cancer index is yes (H/I-High) experience a 58-67% relative risk reduction with extended endocrine therapy).        Based on the above information, I am recommending that you complete a total of 8-10 years of your adjuvant endocrine therapy.       Will complete 10 yrs in February of 2029.      -Patient had left breast mammogram on 12/5/2024, which was BI-RADS 2, and due in 1 year which will be in December 2025.    -Osteopenia.  Patient had DEXA on 12/5/2024, with continued worsening osteopenia in the left femoral neck and left hip.  AP lumbar spine is now at the normal range.  Patient had vitamin D levels performed yesterday which were normal.  She is to continue to take vitamin D and calcium supplementation, as well as weightbearing exercise.  She will be due for repeat DEXA in December 2026.  On reclast per endocrinology.    --Lymphedema of the RUE:  exacerbated, recommend PT.  Use pump once manually reduced for maintenance and wear sleeve.    -BMI 36: Referral to weight loss clinic.    MDM moderate risk  Continuity of complex medical care.   No orders of the defined types were placed in this encounter.      Results From Past 48 Hours:  No results found for this or any previous visit (from the past 48 hours).      Imaging & Referrals:  None   No orders of the defined types were placed in this encounter.             [1]   Allergies  Allergen Reactions    Lymphazurin [Isosulfan Blue] ANAPHYLAXIS    Elavil [Amitriptyline Hcl] OTHER (SEE COMMENTS)     Altered mental state    Flexeril [Cyclobenzaprine Hcl] OTHER (SEE COMMENTS)     Altered mental state    Nabumetone      Other reaction(s): Anaphylaxis    Relafen ANAPHYLAXIS    Cephalexin NAUSEA ONLY

## 2025-06-26 NOTE — PROCEDURES
The patient is here for a left hip injection done under ultrasound guidance.    Under ultrasound guidance the left femoral-acetabular joint was identified and a aaron was placed on the patient's skin.The skin was cleaned with betadyne swabs x 3 and anesthetized with 1% lidocaine without epinephrine.  Then a 22 gauge needle was inserted under ultrasound guidance into the left femoral-acetabular joint.  Aspiration was performed and no blood, fluid, or air was aspirated.  The patient was then injected with 5 ml of 1.5 ml of 40 mg of Kenalog/ml and 3.5 ml of 1% lidocaine without epinephrine.  The needle was removed and a band aid was applied along with triple antibiotic ointment.  The patient will follow up in 2 weeks with a phone call.    These images are permanently stored in the ultrasound unit or PACS system.

## 2025-07-03 ENCOUNTER — TELEPHONE (OUTPATIENT)
Dept: PHYSICAL THERAPY | Facility: HOSPITAL | Age: 70
End: 2025-07-03

## 2025-07-09 ENCOUNTER — OFFICE VISIT (OUTPATIENT)
Dept: PHYSICAL THERAPY | Facility: HOSPITAL | Age: 70
End: 2025-07-09
Attending: INTERNAL MEDICINE
Payer: MEDICARE

## 2025-07-09 DIAGNOSIS — L90.5 AXILLARY WEB SYNDROME: ICD-10-CM

## 2025-07-09 DIAGNOSIS — I89.0 LYMPHEDEMA OF RIGHT ARM: Primary | ICD-10-CM

## 2025-07-09 DIAGNOSIS — L76.82 AXILLARY WEB SYNDROME: ICD-10-CM

## 2025-07-09 PROCEDURE — 97140 MANUAL THERAPY 1/> REGIONS: CPT | Performed by: PHYSICAL THERAPIST

## 2025-07-09 PROCEDURE — 97162 PT EVAL MOD COMPLEX 30 MIN: CPT | Performed by: PHYSICAL THERAPIST

## 2025-07-09 NOTE — PROGRESS NOTES
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 Pt w/ chronic lymphedema RUE and trunk, has been self managing w/ compression garments and home pump. Pt reporting onset of R lateral trunk pain 2 months ago and swelling worsened/unable to control. Saw Dr Beckham who referred her to lymph therapy (pt reports falling just a little over 2 months ago)   Pain level: current 4 /10, at best 0 /10 (when not moving), at worst 10 /10  Description of symptoms:     Occupation: retired   Leisure activities/Hobbies:     Prior level of function: Independnet w/ ADLs, self managing lymphedema (swelling  staying reduced w/ home pump and compression garmetns)  Current limitations: difficulty w/ ADLs due to pain, unable to reduce swelling  Pt goals: Decrease cording, decrease swelling  Hand Dominance: right  Do you live with someone who would be able to assist you: No  Self-Reported Weight: 182 lb  Are you following the recommended diet from your physician:      Past medical history was reviewed with Gemini.  Significant findings include:    Gemini  has a past medical history of Allergic rhinitis, Arthritis, Asthma ([facility]), Breast cancer ([facility]) (11/2018), Cancer (HCC) (9/17/2018), Diverticular disease, Dyslipidemia (04/10/2025), Esophageal reflux, Extrinsic asthma, unspecified, Fibromyalgia, Frequent urination (2022), Frequent UTI, Headache disorder (12/22), Hemorrhoids, Hiatal hernia with GERD, High blood pressure, Insomnia, Muscle weakness (1/23), Numbness  (), Obesity, Osteoarthritis, Osteopenia, Pain, Peripheral neuropathy, Raynaud's phenomenon, SLE (systemic lupus erythematosus) (HCC), Stress fracture of the metatarsals, UTI (urinary tract infection), and Visual impairment.  She  has a past surgical history that includes electrocardiogram, complete; colonoscopy (2014); cholecystectomy (); hemorrhoidectomy; ankle fracture surgery (Left); other surgical history (2018); riya localization wire 1 site right (cpt=19281) (); hysterectomy (); mastectomy right (2018); radiation right; riya biopsy stereo nodule 1 site right (cpt=19081) (2018); total abdom hysterectomy (); hemorrhoidectomy,int/ext,simple; Laparoscopic cholecystectomy (); needle biopsy right (); oophorectomy;  (, ); and d & c.    ASSESSMENT  Gemini presents to physical therapy evaluation with primary c/o Pain R trunk, swelling RUE and trunk. The results of the objective tests and measures show Swelling RUE/trunk, cording/fascial tightness R abd/trunk, mm guarding, decreased flexibility. Functional deficits include but are not limited to difficulty w/ ADLs due to pain, unable to reduce swelling. Signs and symptoms are consistent with a rehabilitation diagnosis of lymphedema, fascial tightness, mm gaurding. Pt and PT discussed evaluation findings, pathology, and POC.  Pt voiced understanding of plan of care and agrees to participate in self-care including HEP and progression towards self-management. Skilled Physical Therapy is medically necessary to address the above impairments and reach functional goals.    Reason for lymphedema: Secondary Lymphedema Cause: Breast cancer  Stage of lymphedema: 2  Phase of treatment: Phase 1: Reduction Phase    OBJECTIVE:     Musculoskeletal       ROM and Strength:  (* denotes performed with pain)  AROM and strength B shld WFL  Shoulder       2025   Shoulder ROM/MMT   Rt Low Trap MMT Decreased   Lt Low Trap MMT  Decreased   Rt Mid Trap MMT Decreased   Lt Mid Trap MMT Decreased   , UE Flexibility       7/9/2025   UE Flexibility   Rt Pec Major mod restricted   Rt Latissimus mod restricted         7/9/2025   UE Flex Other Sites   RUE Other 1 --        tenderness and mm spasming noted R mid thoracic paraspinals, multifidi, and intercostals           Swelling       7/9/2025   Self Care   Lymph Class Secondary Lymphedema   Stage of Lymphedema 2   2nd Class Cause Breast cancer   Cause Breast cancer   Phase of treatment Phase 1: Reduction Phase         7/9/2025   Edema/Tissue Observations   RUE Locations  Right Upper Arm;Right Forearm;Right Wrist;Right Hand   Edema/Tissue Observations: Right upper arm swelling;adipose   Edema/Tissue Observations: Right forearm swelling;adipose   Edema/Tissue Observations: Right wrist swelling   Edema/Tissue Observations: Right hand swelling   Trunk Locations Right Upper Lateral Trunk;Right Upper Stomach;Right Lower Stomach;Right Lower Back   Edema/Tissue Observations: Right upper stomach swelling;cording   Edema/Tissue Observations: Right lower stomach cording;swelling   Edema/Tissue Observations: Right upper lateral trunk swelling   Edema/Tissue Observations: Right lower back --        mid thoracic mm spasm noted on R, guarding   Stemmer's Negative   # of Trunk Measurements 3   Trunk Measurement 1 11 cm inf to sternalnotch: 92.6   Trunk Measurement 2 14 cm inf to sternal notch: 93.8   Trunk Measurement 3 20 cm inf to sternal notch (under L breast) 92.2 cm        Stemmer Sign: Negative    Trunk Measurements       7/9/2025   Trunk Measurements   Trunk Measurement 1 11 cm inf to sternalnotch: 92.6   Trunk Measurement 2 14 cm inf to sternal notch: 93.8   Trunk Measurement 3 20 cm inf to sternal notch (under L breast) 92.2 cm          Lymphedema Measurements       7/9/2025   Lymphedema Calculations   DATE MEASURED 7/9/2025   LOCATION/MEASUREMENTS RUE   PATIENT POSITION  supine   LIMB POSITION 75  degrees abd   STARTING POINT 15 cm from 3rd cuticle   RIGHT HAND VOLUME 19.6 cm across palm   LEFT HAND VOLUME 19.4 cm across palm   MEASUREMENT A 16.8   MEASUREMENT B 21.3   MEASUREMENT C 26.3   MEASUREMENT D 29.1   MEASUREMENT E 28.6   MEASUREMENT F 32.4   MEASUREMENT G 36   MEASUREMENT H 38.9   MEASUREMENT I 40.7    TOTAL VOLUME  3033.54019   DATE MEASURED 7/9/2025   LOCATION/MEASUREMENTS LUE   MEASUREMENT A 16.3   MEASUREMENT B 21.5   MEASUREMENT C 26.2   MEASUREMENT D 28.1   MEASUREMENT E 28.3   MEASUREMENT F 31.6   MEASUREMENT G 33.3   MEASUREMENT H 35.6   MEASUREMENT I 35.8    TOTAL VOLUME  [id number]   % DIFFERENCE 10.28989            Today's Treatment and Response:   Pt education was provided on exam findings, treatment diagnosis, treatment plan, expectations, and prognosis.    Today's Treatment       7/9/2025   PT Treatment   Insurance Auth # and Certification Dates Certification From: 7/9/2025  To: 10/7/2025   # of Visits Used/Approved 1/10   Manual Therapy STM R abd/manual stretching of cording/fascial tightness    STM R pect     STM R thoracic paraspinals, intercostals, multifidi    MLD: Neck sequence, abd sequence, left axillary, A-A, R inguinal, R A-I, Right axillary, RUE.    Compression:  - pt wearing OTS sleeve (purchased since she cannot fit into her custom garments), discussed w/ pt she needs to do bandaging to reduce the swelling. Pt states she still has all the bandaging supplies at home and will start bandaging today   Therapeutic Activity Explained Lymphedema; reviewed lymphatic system, informed patient of lymphedema precautions and risk reduction practices, and importance of skin care.     Explained cording   Manual Therapy Min 40   Evaluation Min 30   Total of Timed Procedures 40   Total of Service Based 30   Total Treatment Time 70         Charges:  PT EVAL: Moderate Complexity, mm3  In agreement with evaluation findings and clinical rationale, this evaluation involved MODERATE COMPLEXITY  decision making due to 1-2 personal factors/comorbidities, 3 or more body structures involved/activity limitations, and evolving symptoms as documented in the evaluation.                                                                   PLAN OF CARE:      Goals: (to be met in 10 visits)        LYMPHEDEMA GOALS Not Met Progressing Toward Partially Met Met   Decrease swelling RUE to be less than 5 % greater than unaffected arm to allow patient to be fitted for compression garments       Pt to be independent with self MLD, ROM exercises, and donning/doffing compression bandages/garments to facilitate swelling reduction and to be independent at self-management once discharged.       Decreased trunk swelling by a total of 2 cm to decreased risks of infection       Improve flexibility (R pect and lat) and tissue mobility (less cording and fascial tightness R abd/trunk) to WFL to allow patient to reach overhead without pain                              Frequency / Duration: Patient will be seen 1-2x/week or a total of 10 visits over a 90 day period. Treatment will include: MLD; Compression; Remedial exercise; Manual therapy; Neuromuscular re-education; Home exercise program instructions; Therapeutic exercises; Therapeutic activities; Self-care home management    Education or treatment limitation: None   Rehab Potential: good     LLIS SCORES    Q1-Q17 Score #of Q's Answered Raw Score Percent Impairment      31    17    1.82    45.5       Patient/Family/Caregiver was advised of these findings, precautions, and treatment options and has agreed to actively participate in planning and for this course of care.    Thank you for your referral. Please co-sign or sign and return this letter via fax as soon as possible to [phone]. If you have any questions, please contact me at Dept: [phone]    Sincerely,  Electronically signed by therapist: Roxi Torres, PT  Physician's certification required: Yes  I certify the need for  these services furnished under this plan of treatment and while under my care.    X___________________________________________________ Date____________________    Certification From: 7/9/2025  To: 10/7/2025

## 2025-07-17 ENCOUNTER — TELEPHONE (OUTPATIENT)
Age: 70
End: 2025-07-17

## 2025-07-17 RX ORDER — PANTOPRAZOLE SODIUM 40 MG/1
40 TABLET, DELAYED RELEASE ORAL DAILY
Qty: 90 TABLET | Refills: 3 | Status: SHIPPED | OUTPATIENT
Start: 2025-07-17

## 2025-07-22 ENCOUNTER — OFFICE VISIT (OUTPATIENT)
Dept: PHYSICAL THERAPY | Facility: HOSPITAL | Age: 70
End: 2025-07-22
Attending: INTERNAL MEDICINE
Payer: MEDICARE

## 2025-07-22 PROCEDURE — 97110 THERAPEUTIC EXERCISES: CPT

## 2025-07-22 PROCEDURE — 97140 MANUAL THERAPY 1/> REGIONS: CPT

## 2025-07-22 PROCEDURE — 97530 THERAPEUTIC ACTIVITIES: CPT

## 2025-07-22 NOTE — PROGRESS NOTES
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  \"My nephew has been helping me bandage my arm because it is hard for me to get a good compression.\"  Pt states that she had some imaging yesterday on her abdominal/trunk R side and she is scheduled for an US of the same area on 8/1       Pain: 3/10 (cording and rib pain)     Objective  Swelling R trunk, chest, axilla, UE.       Assessment  Modified bandaging or RUE.  Used rosidal instead of grey cotton bandage under short stretch bandages.  Will assess next session.    Goals (to be met in 10 visits)   Goals: (to be met in 10 visits)          LYMPHEDEMA GOALS Not Met Progressing Toward Partially Met Met   Decrease swelling RUE to be less than 5 % greater than unaffected arm to allow patient to be fitted for compression garments           Pt to be independent with self MLD, ROM exercises, and donning/doffing compression bandages/garments to facilitate swelling reduction and to be independent at self-management once discharged.           Decreased trunk swelling by a total of 2 cm to decreased risks of infection           Improve flexibility (R pect and lat) and tissue mobility (less cording and fascial tightness R abd/trunk) to WFL to allow patient to reach overhead without pain                                        Plan  Cont with plan    Treatment Last 4 Visits  Treatment Day: 2       7/9/2025 7/22/2025   PT Treatment   Insurance Auth # and Certification Dates Certification From: 7/9/2025  To: 10/7/2025 Certification  From: 7/9/2025  To: 10/7/2025   # of Visits Used/Approved 1/10 2/10   Therapeutic Exercise  PROM of R shoulder, with lat stretch.    Pulleys: flexion and abduction 2 x 10 reps each    Side lying shoulder abduction, horiz abduction and circles x 10 each (issued at previous therapy session)  *pt has pulleys at home but has them put away currently. She will put them back up.   Manual Therapy STM R abd/manual stretching of cording/fascial tightness    STM R pect     STM R thoracic paraspinals, intercostals, multifidi    MLD: Neck sequence, abd sequence, left axillary, A-A, R inguinal, R A-I, Right axillary, RUE.    Compression:  - pt wearing OTS sleeve (purchased since she cannot fit into her custom garments), discussed w/ pt she needs to do bandaging to reduce the swelling. Pt states she still has all the bandaging supplies at home and will start bandaging today STM R abd/manual stretching of cording/fascial tightness    STM R pect     STM R thoracic paraspinals, intercostals, multifidi    MLD: Neck sequence, abd sequence, left axillary, A-A, R inguinal, R A-I, Right axillary, RUE.    Compression:  -Bandaged RUE from wrist to axilla: stockinette, rosidal foam for symmetry, 2 short stretch bandages.  -custom glove on hand and over wrist bandages.  (Will assess pt's reponse to foam vs. Grey cotton.     Therapeutic Activity Explained Lymphedema; reviewed lymphatic system, informed patient of lymphedema precautions and risk reduction practices, and importance of skin care.     Explained cording Reviewed bandaging with patient.  Discussed using rosidal foam for symmetry, double up at elbow fossa.  When wrapping arm with short stretch bandage using \"X\" wrap at same area elbow fossa to help the bandages from sinking in.  Pt has her nephew help her bandage her RUE as she is not able to get a good bandage on herself.   Therapeutic Exercise Min  10   Manual Therapy Min 40 65   Therapeutic Activity Min  10   Evaluation Min 30     Total of Timed Procedures 40 85   Total of Service Based 30 0   Total Treatment Time 70 85        HEP  YES - reviewed use of pulleys and side lying exercises.    Charges     MM4, There Ex1, ThereAct1

## 2025-07-25 ENCOUNTER — OFFICE VISIT (OUTPATIENT)
Dept: PHYSICAL THERAPY | Facility: HOSPITAL | Age: 70
End: 2025-07-25
Attending: INTERNAL MEDICINE
Payer: MEDICARE

## 2025-07-25 PROCEDURE — 97140 MANUAL THERAPY 1/> REGIONS: CPT | Performed by: PHYSICAL THERAPIST

## 2025-07-25 NOTE — PROGRESS NOTES
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 Swelling that occured in hand did reduce back down       Assessment  Modified bandage to include bandaging of the hand    Goals (to be met in 10 visits)      LYMPHEDEMA GOALS Not Met Progressing Toward Partially Met Met   Decrease swelling RUE to be less than 5 % greater than unaffected arm to allow patient to be fitted for compression garments       Pt to be independent with self MLD, ROM exercises, and donning/doffing compression bandages/garments to facilitate swelling reduction and to be independent at self-management once discharged.       Decreased trunk swelling by a total of 2 cm to decreased risks of infection       Improve flexibility (R pect and lat) and tissue mobility (less cording and fascial tightness R abd/trunk) to WFL to allow patient to reach overhead without pain                                  Plan  Cont with plan> measure arm volume next visit    Treatment Last 4 Visits  Treatment Day: 3       7/9/2025 7/22/2025 7/25/2025   PT Treatment   Insurance Auth # and Certification Dates Certification From: 7/9/2025  To: 10/7/2025 Certification From: 7/9/2025  To: 10/7/2025 Certification From: 7/9/2025  To: 10/7/2025. Insurance 7/9 to 10/9/2025   # of Visits Used/Approved 1/10 2/10 3/8   Therapeutic Exercise  PROM of R shoulder, with lat stretch.    Pulleys: flexion and abduction 2 x 10 reps each    Side lying shoulder abduction, horiz  abduction and circles x 10 each (issued at previous therapy session)  *pt has pulleys at home but has them put away currently. She will put them back up.    Manual Therapy STM R abd/manual stretching of cording/fascial tightness    STM R pect     STM R thoracic paraspinals, intercostals, multifidi    MLD: Neck sequence, abd sequence, left axillary, A-A, R inguinal, R A-I, Right axillary, RUE.    Compression:  - pt wearing OTS sleeve (purchased since she cannot fit into her custom garments), discussed w/ pt she needs to do bandaging to reduce the swelling. Pt states she still has all the bandaging supplies at home and will start bandaging today STM R abd/manual stretching of cording/fascial tightness    STM R pect     STM R thoracic paraspinals, intercostals, multifidi    MLD: Neck sequence, abd sequence, left axillary, A-A, R inguinal, R A-I, Right axillary, RUE.    Compression:  -Bandaged RUE from wrist to axilla: stockinette, rosidal foam for symmetry, 2 short stretch bandages.  -custom glove on hand and over wrist bandages.  (Will assess pt's reponse to foam vs. Grey cotton.   STM R pect, lat, and axilla    MLD: Neck sequence, abd sequence, left axillary, A-A, R inguinal, R A-I, Right axillary, Right chest, RUE.      Compression:  - stockinette, elastomull finger wrap, rosidal foam, 3 rolls comprilan, tensogrip over top wrap    Therapeutic Activity Explained Lymphedema; reviewed lymphatic system, informed patient of lymphedema precautions and risk reduction practices, and importance of skin care.     Explained cording Reviewed bandaging with patient.  Discussed using rosidal foam for symmetry, double up at elbow fossa.  When wrapping arm with short stretch bandage using \"X\" wrap at same area elbow fossa to help the bandages from sinking in.  Pt has her nephew help her bandage her RUE as she is not able to get a good bandage on herself.    Therapeutic Exercise Min  10    Manual Therapy Min 40 65 70   Therapeutic  Activity Min  10    Evaluation Min 30     Total of Timed Procedures 40 85 70   Total of Service Based 30 0 0   Total Treatment Time 70 85 70        HEP       Charges     mm5

## 2025-07-28 ENCOUNTER — OFFICE VISIT (OUTPATIENT)
Dept: PHYSICAL THERAPY | Facility: HOSPITAL | Age: 70
End: 2025-07-28
Attending: INTERNAL MEDICINE
Payer: MEDICARE

## 2025-07-28 NOTE — PROGRESS NOTES
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       7/28/2025     9:00 AM 7/9/2025     9:00 AM 9/29/2023     9:00 AM 8/21/2023    11:00 AM 5/23/2023     3:00 PM 4/17/2023    11:00 AM   Lymphedema Calculations   DATE MEASURED 7/28/2025 7/9/2025 9/29/2023 8/21/2023 5/23/2023 4/17/2023   LOCATION/MEASUREMENTS RUE RUE RUE RUE RUE RUE   PATIENT POSITION  supine supine supine supine supine supine   LIMB POSITION 75 degrees abd 75 degrees abd 75 degrees abd 75 degrees abd 75 degrees abd 75 degrees abd   STARTING POINT 15 cm from 3rd cuticle 15 cm from 3rd cuticle 15 cm from 3rd cuticle 15 cm from 3rd cuticle 15 cm from 3rd cuticle 15 cm from 3rd cuticle   RIGHT HAND VOLUME 19.6 cm across palm 19.6 cm across palm       LEFT HAND VOLUME 19.4 cm across palm 19.4 cm across palm       MEASUREMENT A 16.8 16.8 15.6 15.5 15.6 15.7   MEASUREMENT B 21.5 21.3 20.3 20.8 20.5 20.7   MEASUREMENT C 25.7 26.3 25.9 26.3 26.1 26.2   MEASUREMENT D 27.8 29.1 28.7 28.7 28.8 29.1   MEASUREMENT E 27.3 28.6 28.3 28.1 27.8 28.1   MEASUREMENT F 31.6 32.4 32.6 32.8 32.1 32.4   MEASUREMENT G 34.1 36 34.8 35.3 34.8 35   MEASUREMENT H 36.7 38.9 37.3 37.3 37.4 37.1   MEASUREMENT I 37.4 40.7 38 38.2 36.5 36.2    TOTAL VOLUME  2776.28683 3033.56525 2865.49387 2898.33644 2831.50205 2850.92542   DATE MEASURED 7/28/2025 7/9/2025 9/29/2023 8/21/2023 5/23/2023 4/17/2023    LOCATION/MEASUREMENTS LUE LUE LUE LUE LUE LUE   MEASUREMENT A 16.2 16.3 16.2 15.9 16 16.1   MEASUREMENT B 20.6 21.5 20.4 20.3 20.6 20.8   MEASUREMENT C 25.5 26.2 26.2 26.2 26 25.9   MEASUREMENT D 28.1 28.1 28.5 28.5 28.4 28.3   MEASUREMENT E 28.3 28.3 28.7 28.6 28.8 28.8   MEASUREMENT F 31.3 31.6 32.2 32.2 32.5 32.5   MEASUREMENT G 33.3 33.3 33.8 34 33.9 33.8   MEASUREMENT H 35.5 35.6 36.7 36.8 36.4 36.3   MEASUREMENT I 35.6 35.8 37.1 36.5 36.3 36.3    TOTAL VOLUME  2692.80929 2735.53278 2813.98071 2806.93848 2801.30150 2795.27007   % DIFFERENCE 3.0943 10.05521 1.65046 3.2591 1.89003 1.31342            Assessment  Arm swelling reduced, ready for garment measurement.    Goals (to be met in 10 visits)      LYMPHEDEMA GOALS Not Met Progressing Toward Partially Met Met   Decrease swelling RUE to be less than 5 % greater than unaffected arm to allow patient to be fitted for compression garments       Pt to be independent with self MLD, ROM exercises, and donning/doffing compression bandages/garments to facilitate swelling reduction and to be independent at self-management once discharged.       Decreased trunk swelling by a total of 2 cm to decreased risks of infection       Improve flexibility (R pect and lat) and tissue mobility (less cording and fascial tightness R abd/trunk) to WFL to allow patient to reach overhead without pain                                      Plan  Cont w/ CDT, pt to call garment companies to see if in-network    Treatment Last 4 Visits  Treatment Day: 4       7/9/2025 7/22/2025 7/25/2025 7/28/2025   PT Treatment   Insurance Auth # and Certification Dates Certification From: 7/9/2025  To: 10/7/2025 Certification From: 7/9/2025  To: 10/7/2025 Certification From: 7/9/2025  To: 10/7/2025. Insurance 7/9 to 10/9/2025 Certification From: 7/9/2025  To: 10/7/2025. Insurance 7/9 to 10/9/2025   # of Visits Used/Approved 1/10 2/10 3/8 4/8   Therapeutic Exercise  PROM of R shoulder, with lat  stretch.    Pulleys: flexion and abduction 2 x 10 reps each    Side lying shoulder abduction, horiz abduction and circles x 10 each (issued at previous therapy session)  *pt has pulleys at home but has them put away currently. She will put them back up.     Manual Therapy STM R abd/manual stretching of cording/fascial tightness    STM R pect     STM R thoracic paraspinals, intercostals, multifidi    MLD: Neck sequence, abd sequence, left axillary, A-A, R inguinal, R A-I, Right axillary, RUE.    Compression:  - pt wearing OTS sleeve (purchased since she cannot fit into her custom garments), discussed w/ pt she needs to do bandaging to reduce the swelling. Pt states she still has all the bandaging supplies at home and will start bandaging today STM R abd/manual stretching of cording/fascial tightness    STM R pect     STM R thoracic paraspinals, intercostals, multifidi    MLD: Neck sequence, abd sequence, left axillary, A-A, R inguinal, R A-I, Right axillary, RUE.    Compression:  -Bandaged RUE from wrist to axilla: stockinette, rosidal foam for symmetry, 2 short stretch bandages.  -custom glove on hand and over wrist bandages.  (Will assess pt's reponse to foam vs. Grey cotton.   STM R pect, lat, and axilla    MLD: Neck sequence, abd sequence, left axillary, A-A, R inguinal, R A-I, Right axillary, Right chest, RUE.      Compression:  - stockinette, elastomull finger wrap, rosidal foam, 3 rolls comprilan, tensogrip over top wrap  Arm volume measurement    STM R axilla/trunk, areas of tightness    MLD: Neck sequence, abd sequence, left axillary, A-A, R inguinal, R A-I, Right axillary, Right chest, RUE. Increased time on trunk    Compression:  - instr pt to bring all her old garments to next session  - donned sample custom sleeve (jobst CCL1), good fit, but needs CCL2,  discussed if she needs to take the bandage off again to use the sample compression sleeve (but not if it causes any pain)  - applied compression  bandage:  stockinette, elastomull finger wrap, rosidal foam, 3 rolls comprilan (hand wrap applied last) tensogrip over top wrap    Therapeutic Activity Explained Lymphedema; reviewed lymphatic system, informed patient of lymphedema precautions and risk reduction practices, and importance of skin care.     Explained cording Reviewed bandaging with patient.  Discussed using rosidal foam for symmetry, double up at elbow fossa.  When wrapping arm with short stretch bandage using \"X\" wrap at same area elbow fossa to help the bandages from sinking in.  Pt has her nephew help her bandage her RUE as she is not able to get a good bandage on herself.     Therapeutic Exercise Min  10     Manual Therapy Min 40 65 70 85   Therapeutic Activity Min  10     Evaluation Min 30      Total of Timed Procedures 40 85 70 85   Total of Service Based 30 0 0 0   Total Treatment Time 70 85 70 85        HEP       Charges     mm6

## 2025-07-30 ENCOUNTER — OFFICE VISIT (OUTPATIENT)
Dept: PHYSICAL THERAPY | Facility: HOSPITAL | Age: 70
End: 2025-07-30
Attending: INTERNAL MEDICINE
Payer: MEDICARE

## 2025-07-30 DIAGNOSIS — C50.919 BREAST CANCER (HCC): ICD-10-CM

## 2025-07-30 DIAGNOSIS — I89.0 LYMPHEDEMA: Primary | ICD-10-CM

## 2025-07-30 PROCEDURE — 97140 MANUAL THERAPY 1/> REGIONS: CPT | Performed by: PHYSICAL THERAPIST

## 2025-08-01 ENCOUNTER — TELEPHONE (OUTPATIENT)
Facility: CLINIC | Age: 70
End: 2025-08-01

## 2025-08-04 ENCOUNTER — OFFICE VISIT (OUTPATIENT)
Dept: PHYSICAL THERAPY | Facility: HOSPITAL | Age: 70
End: 2025-08-04
Attending: INTERNAL MEDICINE

## 2025-08-04 PROCEDURE — 97140 MANUAL THERAPY 1/> REGIONS: CPT

## 2025-08-06 DIAGNOSIS — E66.9 OBESITY (BMI 30-39.9): ICD-10-CM

## 2025-08-06 RX ORDER — TIRZEPATIDE 2.5 MG/.5ML
2.5 INJECTION, SOLUTION SUBCUTANEOUS WEEKLY
Qty: 2 ML | Refills: 0 | Status: SHIPPED | OUTPATIENT
Start: 2025-08-06

## 2025-08-07 ENCOUNTER — OFFICE VISIT (OUTPATIENT)
Dept: PHYSICAL THERAPY | Facility: HOSPITAL | Age: 70
End: 2025-08-07
Attending: INTERNAL MEDICINE

## 2025-08-07 PROCEDURE — 97140 MANUAL THERAPY 1/> REGIONS: CPT

## 2025-08-11 ENCOUNTER — ANESTHESIA EVENT (OUTPATIENT)
Dept: ENDOSCOPY | Facility: HOSPITAL | Age: 70
End: 2025-08-11

## 2025-08-11 ENCOUNTER — ANESTHESIA (OUTPATIENT)
Dept: ENDOSCOPY | Facility: HOSPITAL | Age: 70
End: 2025-08-11

## 2025-08-11 ENCOUNTER — HOSPITAL ENCOUNTER (OUTPATIENT)
Facility: HOSPITAL | Age: 70
Setting detail: HOSPITAL OUTPATIENT SURGERY
Discharge: HOME | End: 2025-08-11
Attending: STUDENT IN AN ORGANIZED HEALTH CARE EDUCATION/TRAINING PROGRAM | Admitting: STUDENT IN AN ORGANIZED HEALTH CARE EDUCATION/TRAINING PROGRAM

## 2025-08-11 VITALS
OXYGEN SATURATION: 99 % | DIASTOLIC BLOOD PRESSURE: 63 MMHG | TEMPERATURE: 99 F | WEIGHT: 175 LBS | HEIGHT: 60 IN | SYSTOLIC BLOOD PRESSURE: 151 MMHG | BODY MASS INDEX: 34.36 KG/M2 | HEART RATE: 67 BPM | RESPIRATION RATE: 18 BRPM

## 2025-08-11 DIAGNOSIS — K21.9 GASTROESOPHAGEAL REFLUX DISEASE, UNSPECIFIED WHETHER ESOPHAGITIS PRESENT: ICD-10-CM

## 2025-08-11 DIAGNOSIS — Z12.11 SCREENING FOR COLON CANCER: ICD-10-CM

## 2025-08-11 DIAGNOSIS — R10.13 EPIGASTRIC PAIN: ICD-10-CM

## 2025-08-11 PROCEDURE — 45385 COLONOSCOPY W/LESION REMOVAL: CPT | Performed by: STUDENT IN AN ORGANIZED HEALTH CARE EDUCATION/TRAINING PROGRAM

## 2025-08-11 PROCEDURE — 43239 EGD BIOPSY SINGLE/MULTIPLE: CPT | Performed by: STUDENT IN AN ORGANIZED HEALTH CARE EDUCATION/TRAINING PROGRAM

## 2025-08-11 PROCEDURE — 45380 COLONOSCOPY AND BIOPSY: CPT | Performed by: STUDENT IN AN ORGANIZED HEALTH CARE EDUCATION/TRAINING PROGRAM

## 2025-08-11 RX ORDER — SODIUM CHLORIDE, SODIUM LACTATE, POTASSIUM CHLORIDE, CALCIUM CHLORIDE 600; 310; 30; 20 MG/100ML; MG/100ML; MG/100ML; MG/100ML
INJECTION, SOLUTION INTRAVENOUS CONTINUOUS
Status: DISCONTINUED | OUTPATIENT
Start: 2025-08-11 | End: 2025-08-11

## 2025-08-11 RX ADMIN — SODIUM CHLORIDE, SODIUM LACTATE, POTASSIUM CHLORIDE, CALCIUM CHLORIDE: 600; 310; 30; 20 INJECTION, SOLUTION INTRAVENOUS at 08:38:00

## 2025-08-13 ENCOUNTER — OFFICE VISIT (OUTPATIENT)
Dept: PHYSICAL THERAPY | Facility: HOSPITAL | Age: 70
End: 2025-08-13
Attending: INTERNAL MEDICINE

## 2025-08-13 PROCEDURE — 97140 MANUAL THERAPY 1/> REGIONS: CPT

## 2025-08-13 PROCEDURE — 97530 THERAPEUTIC ACTIVITIES: CPT

## 2025-08-14 ENCOUNTER — APPOINTMENT (OUTPATIENT)
Dept: PHYSICAL THERAPY | Facility: HOSPITAL | Age: 70
End: 2025-08-14
Attending: INTERNAL MEDICINE

## 2025-08-15 ENCOUNTER — TELEPHONE (OUTPATIENT)
Facility: CLINIC | Age: 70
End: 2025-08-15

## 2025-08-15 DIAGNOSIS — Z12.11 COLON CANCER SCREENING: Primary | ICD-10-CM

## 2025-08-22 ENCOUNTER — OFFICE VISIT (OUTPATIENT)
Dept: SURGERY | Facility: CLINIC | Age: 70
End: 2025-08-22

## 2025-08-22 VITALS
OXYGEN SATURATION: 99 % | WEIGHT: 175.5 LBS | BODY MASS INDEX: 34.01 KG/M2 | HEART RATE: 56 BPM | SYSTOLIC BLOOD PRESSURE: 112 MMHG | HEIGHT: 60.4 IN | DIASTOLIC BLOOD PRESSURE: 80 MMHG

## 2025-08-22 DIAGNOSIS — E78.5 DYSLIPIDEMIA: Primary | ICD-10-CM

## 2025-08-22 DIAGNOSIS — R73.03 PREDIABETES: ICD-10-CM

## 2025-08-22 DIAGNOSIS — E66.9 OBESITY (BMI 30-39.9): ICD-10-CM

## 2025-08-22 PROCEDURE — 1160F RVW MEDS BY RX/DR IN RCRD: CPT | Performed by: NURSE PRACTITIONER

## 2025-08-22 PROCEDURE — 3008F BODY MASS INDEX DOCD: CPT | Performed by: NURSE PRACTITIONER

## 2025-08-22 PROCEDURE — 3079F DIAST BP 80-89 MM HG: CPT | Performed by: NURSE PRACTITIONER

## 2025-08-22 PROCEDURE — 3074F SYST BP LT 130 MM HG: CPT | Performed by: NURSE PRACTITIONER

## 2025-08-22 PROCEDURE — 99214 OFFICE O/P EST MOD 30 MIN: CPT | Performed by: NURSE PRACTITIONER

## 2025-08-22 PROCEDURE — 1159F MED LIST DOCD IN RCRD: CPT | Performed by: NURSE PRACTITIONER

## 2025-08-22 RX ORDER — TIRZEPATIDE 2.5 MG/.5ML
2.5 INJECTION, SOLUTION SUBCUTANEOUS WEEKLY
Qty: 2 ML | Refills: 3 | Status: SHIPPED | OUTPATIENT
Start: 2025-08-22

## 2025-08-27 ENCOUNTER — APPOINTMENT (OUTPATIENT)
Dept: PHYSICAL THERAPY | Facility: HOSPITAL | Age: 70
End: 2025-08-27
Attending: INTERNAL MEDICINE

## (undated) DEVICE — Device

## (undated) DEVICE — ADHESIVE MASTISOL 2/3CC VL

## (undated) DEVICE — SUTURE VICRYL 2-0

## (undated) DEVICE — DRAPE SHEET LG

## (undated) DEVICE — MAJOR GENERAL: Brand: MEDLINE INDUSTRIES, INC.

## (undated) DEVICE — IMPERVIOUS STOCKINETTE: Brand: DEROYAL

## (undated) DEVICE — SUTURE VLOC 90 3-0 23\" 0034

## (undated) DEVICE — STERILE LATEX POWDER-FREE SURGICAL GLOVESWITH NITRILE COATING: Brand: PROTEXIS

## (undated) DEVICE — KIT CLEAN ENDOKIT 1.1OZ GOWNX2

## (undated) DEVICE — DRAIN SILICONE FLAT 10MM

## (undated) DEVICE — ABDOMINAL PAD: Brand: CURITY

## (undated) DEVICE — KIT ENDO ORCAPOD 160/180/190

## (undated) DEVICE — STANDARD HYPODERMIC NEEDLE,POLYPROPYLENE HUB: Brand: MONOJECT

## (undated) DEVICE — 3M™ STERI-STRIP™ REINFORCED ADHESIVE SKIN CLOSURES, R1547, 1/2 IN X 4 IN (12 MM X 100 MM), 6 STRIPS/ENVELOPE: Brand: 3M™ STERI-STRIP™

## (undated) DEVICE — 1 ML INSULIN SYRINGE REGULAR LUER TIP: Brand: MONOJECT

## (undated) DEVICE — BLOCK BITE LG LUMN 20X27MM GRN DISP

## (undated) DEVICE — DRAIN RESERVOIR RELIAVAC 100CC

## (undated) DEVICE — COVER PRB NEOGUARD 30X2.6CM US

## (undated) DEVICE — SUTURE SILK 2-0 FS

## (undated) DEVICE — SUTURE VICRYL 2-0 CT-1

## (undated) DEVICE — CURVED, SMALL JAW, OPEN SEALER/DIVIDER: Brand: LIGASURE

## (undated) DEVICE — PROXIMATE RH ROTATING HEAD SKIN STAPLERS (35 WIDE) CONTAINS 35 STAINLESS STEEL STAPLES: Brand: PROXIMATE

## (undated) DEVICE — TUBING SCT CLR 6FT .25IN MDVC

## (undated) DEVICE — KIT MFLD FOR SPEC COLL

## (undated) DEVICE — SOL  .9 1000ML BTL

## (undated) DEVICE — SUTURE VICRYL 3-0 J442H

## (undated) NOTE — LETTER
[facility] SURGERY SCHEDULING FORM   [address] [facility]   [phone] (scheduling phone) [phone] (scheduling fax)     PATIENT INFORMATION   Last Name:      German Gonzalez      First Name:    PETAR []  Spinal  []  No Anesthesia   [x]  Yes  []  No or using our own   Allergies: Alan Chun; Elavil [Amitriptyline Hcl]; Flexeril [Cyclobenzaprine Hcl]; Nabumetone; Relafen         Completed by:     Dipika AYERS      Date:    1/28/2020

## (undated) NOTE — LETTER
AUTHORIZATION FOR SURGICAL OPERATION OR OTHER PROCEDURE    1. I hereby authorize Dr. Martin Silva and the [facility] staff assigned to my case to perform the following operation and/or procedure at the [facility]:    Bilateral 3rd Aia 16 injection under ultrasound guidance    2. My physician has explained the nature and purpose of the operation or other procedure, possible alternative methods of treatment, the risks involved, and the possibility of complication to me. I acknowledge that no guarantee has been made as to the result that may be obtained. 3.  I recognize that, during the course of this operation, or other procedure, unforseen conditions may necessitate additional or different procedure than those listed above. I, therefore, further authorize and request that the above named physician, his/her physician assistants or designees perform such procedures as are, in his/her professional opinion, necessary and desirable. 4.  Any tissue or organs removed in the operation or other procedure may be disposed of by and at the discretion of the [facility] staff and [facility]. 5.  I understand that in the event of a medical emergency, I will be transported by local paramedics to [facility] or other hospital emergency department. 6.  I certify that I have read and fully understand the above consent to operation and/or other procedure. 7.  I acknowledge that my physician has explained sedation/analgesia administration to me including the risks and benefits. I consent to the administration of sedation/analgesia as may be necessary or desirable in the judgement of my physician. Witness signature: ___________________________________________________ Date:  ______/______/_____                    Time:  ________ A. M.  P.M.        Patient Name:  Huan He  1/67/1846  [id number]         Patient signature: ___________________________________________________               Statement of Physician  My signature below affirms that prior to the time of the procedure, I have explained to the patient and/or his/her guardian, the risks and benefits involved in the proposed treatment and any reasonable alternative to the proposed treatment. I have also explained the risks and benefits involved in the refusal of the proposed treatment and have answered the patient's questions.                         Date:  ______/______/_______  Provider                      Signature:  __________________________________________________________       Time:  ___________ AASH BABCOCK

## (undated) NOTE — ED AVS SNAPSHOT
Bean Cano   MRN: [id number]    Department:  [facility] Emergency Department   Date of Visit:  9/10/2019           Disclosure     Insurance plans vary and the physician(s) referred by the ER may not be covered by your plan.  Please contac CARE PHYSICIAN AT ONCE OR RETURN IMMEDIATELY TO THE EMERGENCY DEPARTMENT. If you have been prescribed any medication(s), please fill your prescription right away and begin taking the medication(s) as directed.   If you believe that any of the medications

## (undated) NOTE — LETTER
AUTHORIZATION FOR SURGICAL OPERATION OR OTHER PROCEDURE    1.  I hereby authorize Dr. Radha Cervantes and the [facility] staff assigned to my case to perform the following operation and/or procedure at the [facility]:    Right 3rd finger flexor tendon sheath in Patient signature:  ___________________________________________________             Relationship to Patient:           []  Parent    Responsible person                          []  Spouse  In case of minor or                    [] Other  _____________   In

## (undated) NOTE — LETTER
7/8/2024      Fransisco Ambrosio MD  Physical Medicine and Rehabilitation  [address] [place] [address], [address]  [facility] [address]  Dept: [phone]  Dept Fax: [phone]        RE: Consultation for Gemini Mendoza        Dear Garrick Croft MD,    Thank you very much for the opportunity to see your patient.  Attached please find a summary from your patient's recent visit.     I appreciate the chance to take care of your patient with you.  Please feel free to call me with any questions or concerns.    Sincerely,        Fransisco Ambrosio MD  Electronically Signed on 7/8/2024

## (undated) NOTE — LETTER
AUTHORIZATION FOR SURGICAL OPERATION OR OTHER PROCEDURE    1.  I hereby authorize Dr. Shoshana Garcia and the [facility] staff assigned to my case to perform the following operation and/or procedure at the [facility]:    Right 3rd finger flexor tendon sheath in Patient signature:  ___________________________________________________             Relationship to Patient:           []  Parent    Responsible person                          []  Spouse  In case of minor or                    [] Other  _____________   In

## (undated) NOTE — LETTER
6/3/2025      Fransisco Ambrosio MD  Physical Medicine and Rehabilitation  [address] [place] [address], [address]  [facility] [address]  Dept: [phone]  Dept Fax: [phone]        RE: Consultation for Gemini Mendoza        Dear Garrcik Croft MD,    Thank you very much for the opportunity to see your patient.  Attached please find a summary from your patient's recent visit.     I appreciate the chance to take care of your patient with you.  Please feel free to call me with any questions or concerns.    Sincerely,        Fransisco Ambrosio MD  Electronically Signed on 6/3/2025

## (undated) NOTE — LETTER
ROBERT Notifier: [employer]. Patient Name: Gemini Mendoza Identification Number: [id number]                          Advance Beneficiary Notice of Noncoverage (ABN)   NOTE:  If Medicare doesn’t pay for D. item/service(s) below, you may have to pay.  Medicare does not pay for everything, even some care that you or your health care provider have good reason to think you need. We expect Medicare may not pay for the D. item/service(s) below.  D. Items or Services E. Reason Medicare May Not Pay: F. Estimated Cost   left hip injection under ultrasound guidance      __ Medicare does not cover this service      __ Medicare may not pay for this   item/service for your condition     __ Medicare may not pay for this item/service as often as this      $331.30   WHAT YOU NEED TO DO NOW:  Read this notice, so you can make an informed decision about your care.  Ask us any questions that you may have after you finish reading.  Choose an option below about whether to receive the D. item/service(s) listed above.  Note: If you choose Option 1 or 2, we may help you to use any other insurance that you might have, but Medicare cannot require us to do this.  G. OPTIONS: Check only one box.  We cannot choose a box for you.   OPTION 1. I want the D. item/service(s) listed above. You may ask to be paid now, but I also want Medicare billed for an official decision on payment, which is sent to me on a Medicare Summary Notice (MSN). I understand that if Medicare doesn’t pay, I am responsible for payment, but I can appeal to Medicare by following the directions on the MSN. If Medicare does pay, you will refund any payments I made to you, less co-pays or deductibles.  OPTION 2. I want the D. item/service(s) listed above, but do not bill Medicare. You may ask to be paid now as I am responsible for payment. I cannot appeal if Medicare is not billed.  OPTION 3. I don't want the D. item/service(s) listed above. I understand with  this choice I am not responsible for payment, and I cannot appeal to see if Medicare would pay.    H. Additional Information:    This notice gives our opinion, not an official Medicare decision. If you have other questions on this notice or Medicare billing, call 1-800-MEDICARE (1-581.111.9217/TTY: 1-996.589.3374). Signing below means that you have received and understand this notice. You also receive a copy.  I. Signature: J. Date:       You have the right to get Medicare information in an accessible format, like large print, Braille, or audio. You also have the right to file a complaint if you feel you’ve been discriminated against. Visit Medicare.gov/about- us/tgpairjcrcypd-irxgttrudhgzmnoii-jawxyn.  According to the Paperwork Reduction Act of 1995, no persons are required to respond to a collection of information unless it displays a valid OMB control number. The valid OMB control number for this information collection is 0087-3393. The time required to complete this information collection is estimated to average 7 minutes per response, including the time to review instructions, search existing data resources, gather the data needed, and complete and review the information collection. If you have comments concerning the accuracy of the time estimate or suggestions for improving this form, please write to: CMS, [address]     Jessica Morinn: SILVINA Reports Clearance Officer, [place], [place] [address].  Form CMS-R-131 (Exp. 1/31/2026) Form Approved OMB No. [id number]

## (undated) NOTE — LETTER
2/17/2025      Fransisco Ambrosio MD  Physical Medicine and Rehabilitation  [address] [place] [address], [address]  [facility] [address]  Dept: [phone]  Dept Fax: [phone]        RE: Consultation for Gemini Mendoza        Dear Garrick Croft MD,    Thank you very much for the opportunity to see your patient.  Attached please find a summary from your patient's recent visit.     I appreciate the chance to take care of your patient with you.  Please feel free to call me with any questions or concerns.    Sincerely,        Fransisco Ambrosio MD  Electronically Signed on 2/17/2025

## (undated) NOTE — LETTER
[facility], [place]  Authorization for Invasive Procedures  1.  I hereby authorize Dr. Indira Brown , my physician and whomever may be designated as the doctor's assistant, to perform the following operation and/or procedure:  Stereot performed for the purposes of advancing medicine, science, and/or education, provided my identity is not revealed. If the procedure has been videotaped, the physician/surgeon will obtain the original videotape.  The hospital will not be responsible for stor My signature below affirms that prior to the time of the procedure, I have explained to the patient and/or her legal representative, the risks and benefits involved in the proposed treatment and any reasonable alternative to the proposed treatment.  I have

## (undated) NOTE — LETTER
AUTHORIZATION FOR SURGICAL OPERATION OR OTHER PROCEDURE    1.  I hereby authorize Dr. Donna Puckett and the [facility] staff assigned to my case to perform the following operation and/or procedure at the [facility]:      [address] [place] [address] ___________________________________________________             Relationship to Patient:           []  Parent    Responsible person                          []  Spouse  In case of minor or                    [] Other  _____________   Incompetent name:  ___

## (undated) NOTE — LETTER
2020    Dr Serina Curry [address] [facility] [phone]        : 59         Dr Kylie Trotter,         I am writing regarding our mutual patient, Sahara Mahan, who awaits lumbar

## (undated) NOTE — LETTER
10/15/2024      Fransisco Ambrosio MD  Physical Medicine and Rehabilitation  [address] [place] [address], [address]  [facility] [address]  Dept: [phone]  Dept Fax: [phone]        RE: Consultation for Gemini Mendoza        Dear Garrick Croft MD,    Thank you very much for the opportunity to see your patient.  Attached please find a summary from your patient's recent visit.     I appreciate the chance to take care of your patient with you.  Please feel free to call me with any questions or concerns.    Sincerely,        Fransisco Ambrosio MD  Electronically Signed on 10/15/2024

## (undated) NOTE — LETTER
6/25/2025      Fransisco Ambrosio MD  Physical Medicine and Rehabilitation  [address] [place] [address], [address]  [facility] [address]  Dept: [phone]  Dept Fax: [phone]        RE: Consultation for Gemini Mendoza        Dear Garrick Croft MD,    Thank you very much for the opportunity to see your patient.  Attached please find a summary from your patient's recent visit.     I appreciate the chance to take care of your patient with you.  Please feel free to call me with any questions or concerns.    Sincerely,        Fransisco Ambrosio MD  Electronically Signed on 6/25/2025

## (undated) NOTE — LETTER
JEFE. Notifier: [employer]   ARMEN. Patient Name: Kerrie Lorenzana. Identification Number: [id number]      Advance Beneficiary Notice of Noncoverage (ABN)  NOTE:  If Medicare doesn’t pay for D. Right 3rd finger flexor tendon sheath injection at the may ask to be paid now, but I also want Medicare billed for an official decision on payment, which is sent to me on a Medicare  Summary Notice (MSN).  I understand that if Medicare doesn’t pay, I am responsible for payment, but I can appeal to Medicare by f data needed, and complete and review the information collection.  If you have comments concerning the accuracy of the time estimate or suggestions for improving this form, please write to: CMS, [address], Attn: SILVINA Reports Clearance Officer, B

## (undated) NOTE — LETTER
Notifier: [employer]       Patient Name: Gemini Mendoza       Identification Number: [id number]                          Advance Beneficiary Notice of Noncoverage (ABN)   NOTE:  If Medicare doesn’t pay for D. Items/service(s) below, you may have to pay.  Medicare does not pay for everything, even some care that you or your health care provider have good reason to think you need. We expect Medicare may not pay for the D. items/service(s) below.  Items or Services  Bilateral 3rd CMC injection under ultrasound guidance  Reason Medicare May Not Pay: Estimated Cost   __EKG ($129.00)  __Pap smear ($48.23) __Pelvic/Breast ($65.00)  __ Ear Irrigation ($149)  _X_ Injection(s)  ___ Tdap ($70)       ___ Meningitis ($206)   __Prevnar ($285)  ___ Td ($51)            ___Shingrix ($215)        __Prevnar 20 ($309)  ___ Hep A ($156)   ___Prolia ($1827.00)     __ Xiaflex ($              )   ___ Hep B ($167)      __Pneumovax ($155)                                            ___ Vaccine Administration ($31)   __ Medicare does not cover this service      __ Medicare may not pay for this   item/service for your condition     __ Medicare may not pay for this item/service as often as this        WHAT YOU NEED TO DO NOW:  Read this notice, so you can make an informed decision about your care.  Ask us any questions that you may have after you finish reading.  Choose an option below about whether to receive the D. item/service(s)  listed above.  Note: If you choose Option 1 or 2, we may help you to use any other insurance that you might have, but Medicare cannot require us to do this.  OPTIONS: Check only one box.  We cannot choose a box for you.   OPTION 1. I want the D. item/service(s) listed above. You may ask to be paid now, but I also want Medicare billed for an official decision on payment, which is sent to me on a Medicare Summary Notice (MSN). I understand that if Medicare doesn’t pay, I am responsible for payment, but I can  appeal to Medicare by following the directions on the MSN. If Medicare does pay, you will refund any payments I made to you, less co-pays or deductibles.  OPTION 2. I want the D. item/service(s) listed above, but do not bill Medicare. You may ask to be paid now as I am responsible for payment. I cannot appeal if Medicare is not billed.  OPTION 3. I don't want the D. item/service(s) listed above. I understand with this choice I am not responsible for payment, and I cannot appeal to see if Medicare would pay.    H. Additional Information:    This notice gives our opinion, not an official Medicare decision. If you have other questions on this notice or Medicare billing, call 1-800-MEDICARE (1-462.391.9073/TTY: [phone]). Signing below means that you have received and understand this notice. You also receive a copy.  Signature: Date:       You have the right to get Medicare information in an accessible format, like large print, Braille, or audio. You also have the right to file a complaint if you feel you’ve been discriminated against. Visit Medicare.gov/about- us/twmoemlvmfkdm-zudrqnxhnkkwyqtxi-mbttpx.  According to the Paperwork Reduction Act of 1995, no persons are required to respond to a collection of information unless it displays a valid OMB control number. The valid OMB control number for this information collection is [id number]. The time required to complete this information collection is estimated to average 7 minutes per response, including the time to review instructions, search existing data resources, gather the data needed, and complete and review the information collection. If you have comments concerning the accuracy of the time estimate or suggestions for improving this form, please write to: CMS, [facility] Security     Mikel Attn: SILVINA Reports Clearance Officer, [place], [place] [address].  Form CMS-R-131 (Exp. 1/31/2026) Form Approved OMB No. [id number]

## (undated) NOTE — LETTER
4/25/2019      Vanessa Eugene MD  Physical Medicine and Rehabilitation  [address] [facility] [address] [address]  Dept: [phone]  Dept Fax: [phone]        RE: Consultation for Zachariah         Dear Lisa Paul MD,    Than

## (undated) NOTE — LETTER
AUTHORIZATION FOR SURGICAL OPERATION OR OTHER PROCEDURE    1. I hereby authorize Dr. Fransisco Ambrosio and the [facility] staff assigned to my case to perform the following operation and/or procedure at the [facility]:    Bilateral 3rd CMC injection under ultrasound guidance     2.  My physician has explained the nature and purpose of the operation or other procedure, possible alternative methods of treatment, the risks involved, and the possibility of complication to me.  I acknowledge that no guarantee has been made as to the result that may be obtained.  3.  I recognize that, during the course of this operation, or other procedure, unforseen conditions may necessitate additional or different procedure than those listed above.  I, therefore, further authorize and request that the above named physician, his/her physician assistants or designees perform such procedures as are, in his/her professional opinion, necessary and desirable.  4.  Any tissue or organs removed in the operation or other procedure may be disposed of by and at the discretion of the [facility] staff and [facility].  5.  I understand that in the event of a medical emergency, I will be transported by local paramedics to [facility] or other hospital emergency department.  6.  I certify that I have read and fully understand the above consent to operation and/or other procedure.    7.  I acknowledge that my physician has explained sedation/analgesia administration to me including the risks and benefits.  I consent to the administration of sedation/analgesia as may be necessary or desirable in the judgement of my physician.    Witness signature: ___________________________________________________ Date:  ______/______/_____                    Time:  ________ A.M.  P.M.       Patient Name:  Gemini Mendoza  4/14/1955  [id number]         Patient signature:   ___________________________________________________                 Statement of Physician  My signature below affirms that prior to the time of the procedure, I have explained to the patient and/or his/her guardian, the risks and benefits involved in the proposed treatment and any reasonable alternative to the proposed treatment.  I have also explained the risks and benefits involved in the refusal of the proposed treatment and have answered the patient's questions.                        Date:  ______/______/_______  Provider                      Signature:  __________________________________________________________       Time:  ___________ AASH BABCOCK

## (undated) NOTE — LETTER
25        To Whom It May Concern:      Gemini Mendoza  :  1955       would like to perform a hip steroid injection for the above patient. Prior to proceeding,  would like endocrinology - 's opinion on this.    []  Patient has been cleared to proceed with hip steroid injection.     []  Patient has NOT been cleared for hip steroid injection.        X_________________________________________  (Provider signature)                                     (Date)      Please make a selection, sign and fax this letter back to our office    If this office may be of further assistance, please do not hesitate to contact us.      Sincerely,    Fransisco Ambrosio MD    Phone #: [phone]  Fax #: [phone]

## (undated) NOTE — LETTER
AUTHORIZATION FOR SURGICAL OPERATION OR OTHER PROCEDURE    1. I hereby authorize Dr. Fransisco Ambrosio and the [facility] staff assigned to my case to perform the following operation and/or procedure at the [facility]:    left hip injection under ultrasound guidance       2.  My physician has explained the nature and purpose of the operation or other procedure, possible alternative methods of treatment, the risks involved, and the possibility of complication to me.  I acknowledge that no guarantee has been made as to the result that may be obtained.  3.  I recognize that, during the course of this operation, or other procedure, unforseen conditions may necessitate additional or different procedure than those listed above.  I, therefore, further authorize and request that the above named physician, his/her physician assistants or designees perform such procedures as are, in his/her professional opinion, necessary and desirable.  4.  Any tissue or organs removed in the operation or other procedure may be disposed of by and at the discretion of the [facility] staff and [facility].  5.  I understand that in the event of a medical emergency, I will be transported by local paramedics to [facility] or other hospital emergency department.  6.  I certify that I have read and fully understand the above consent to operation and/or other procedure.    7.  I acknowledge that my physician has explained sedation/analgesia administration to me including the risks and benefits.  I consent to the administration of sedation/analgesia as may be necessary or desirable in the judgement of my physician.    Witness signature: ___________________________________________________ Date:  ______/______/_____                    Time:  ________ A.M.  P.MLuis Antonio Mendoza  4/14/1955  [id number]         Patient signature:  ___________________________________________________                 Statement of  Physician  My signature below affirms that prior to the time of the procedure, I have explained to the patient and/or his/her guardian, the risks and benefits involved in the proposed treatment and any reasonable alternative to the proposed treatment.  I have also explained the risks and benefits involved in the refusal of the proposed treatment and have answered the patient's questions.                        Date:  ______/______/_______  Provider                      Signature:  __________________________________________________________       Time:  ___________ A.M    P.M.

## (undated) NOTE — Clinical Note
Lilibeth, I met with Gemini in clinic today for weight loss/management. I have recommended intensive lifestyle/behavioral modifications for weight loss. In addition, I have recommended she continue metformin ordered by katheryn, start our Jump Start program, and RTC in 4 months or as needed for ongoing support.  Please let me know if you have any questions or concerns. Thank you very much for referring your patient to our clinic.   Take good care, Olga Souza APRN

## (undated) NOTE — LETTER
[address] [place] [address] Rd, [place], [place]     AUTHORIZATION FOR SURGICAL OPERATION OR PROCEDURE    I hereby authorize Dr. Murtaza Crowell, my Physician(s) and whomever may be designated as the doctor's Assistant, to perform the following oper Physician. 4. I consent to the photographing of procedure(s) to be performed for the purposes of advancing medicine, science and/or education, provided my identity is not revealed.  If the procedure has been videotaped, the physician/surgeon will obtain th (Witness signature)                                                                                                  (Date)                                (Time)  STATEMENT OF PHYSICIAN My signature below affirms that prior to the time of the procedure;  I

## (undated) NOTE — LETTER
ROBERT Notifier: [employer]   ARMEN. Patient Name: Vanessa Gracia Identification Number: [id number]      Advance Beneficiary Notice of Noncoverage (ABN)  NOTE:  If Medicare doesn’t pay for D. Item/service(s) below, you may have to pay.   Medicare responsible for payment, and I cannot appeal to see if Medicare would pay. H. Additional Information: This notice gives our opinion, not an official Medicare decision.  If you have other questions on this notice or Medicare billing, call 1-800-MEDICARE

## (undated) NOTE — LETTER
AUTHORIZATION FOR SURGICAL OPERATION OR OTHER PROCEDURE    1. I hereby authorize Dr. Virginia Caicedo and the [facility] staff assigned to my case to perform the following operation and/or procedure at the [facility]:     Left 3rd finger flexor tendon sheath injection at the site of the A1 pulley under ultrasound guidance     2. My physician has explained the nature and purpose of the operation or other procedure, possible alternative methods of treatment, the risks involved, and the possibility of complication to me. I acknowledge that no guarantee has been made as to the result that may be obtained. 3.  I recognize that, during the course of this operation, or other procedure, unforseen conditions may necessitate additional or different procedure than those listed above. I, therefore, further authorize and request that the above named physician, his/her physician assistants or designees perform such procedures as are, in his/her professional opinion, necessary and desirable. 4.  Any tissue or organs removed in the operation or other procedure may be disposed of by and at the discretion of the [facility] staff and [facility]. 5.  I understand that in the event of a medical emergency, I will be transported by local paramedics to [facility] or other hospital emergency department. 6.  I certify that I have read and fully understand the above consent to operation and/or other procedure. 7.  I acknowledge that my physician has explained sedation/analgesia administration to me including the risks and benefits. I consent to the administration of sedation/analgesia as may be necessary or desirable in the judgement of my physician. Witness signature: ___________________________________________________ Date:  ______/______/_____                    Time:  ________ A. M.  P.MLuis Antonio Yanez [facility]  [birth year]  [id number]     Patient signature: ___________________________________________________                 Statement of Physician  My signature below affirms that prior to the time of the procedure, I have explained to the patient and/or his/her guardian, the risks and benefits involved in the proposed treatment and any reasonable alternative to the proposed treatment. I have also explained the risks and benefits involved in the refusal of the proposed treatment and have answered the patient's questions.                         Date:  ______/______/_______  Provider                      Signature:  __________________________________________________________       Time:  ___________ AASH BABCOCK

## (undated) NOTE — LETTER
ROBERT Notifier: [employer]   ARMEN. Patient Name: Raymond Christianson. Identification Number: [id number]      Advance Beneficiary Notice of Noncoverage (ABN)  NOTE:  If Medicare doesn’t pay for D. item/service below, you may have to pay.   Medicare ram ? OPTION 2. I want the D. item/service listed above, but do not bill Medicare. You may ask to be paid now as I am responsible for payment. I cannot appeal if Medicare is not billed. ? OPTION 3. I don’t want the D. item/service listed above.  I understand

## (undated) NOTE — LETTER
6/8/2023      Ulysses Ina A. Glyn Bloom, MD  Physical Medicine and Rehabilitation  [address] [facility], [address] [place] [address]  Dept: [phone]  Dept Fax: [phone]        RE: Consultation for Floyd Kohler        Dear Courtney Welch MD,    Thank you very much for the opportunity to see your patient. Attached please find a summary from your patient's recent visit. I appreciate the chance to take care of your patient with you. Please feel free to call me with any questions or concerns. Sincerely,        Calli Escobar.  MD Daily  Electronically Signed on 6/8/2023

## (undated) NOTE — LETTER
[address] [place] [address] Rd, [place], [place]     AUTHORIZATION FOR SURGICAL OPERATION OR PROCEDURE    I hereby authorize Dr. Bozena Humphrey, my Physician(s) and whomever may be designated as the doctor's Assistant, to perform the following oper 4. I consent to the photographing of procedure(s) to be performed for the purposes of advancing medicine, science and/or education, provided my identity is not revealed.  If the procedure has been videotaped, the physician/surgeon will obtain the original v (Witness signature)                                                                                                  (Date)                                (Time)  STATEMENT OF PHYSICIAN My signature below affirms that prior to the time of the procedure;  I